# Patient Record
Sex: FEMALE | Race: BLACK OR AFRICAN AMERICAN | NOT HISPANIC OR LATINO | Employment: OTHER | URBAN - METROPOLITAN AREA
[De-identification: names, ages, dates, MRNs, and addresses within clinical notes are randomized per-mention and may not be internally consistent; named-entity substitution may affect disease eponyms.]

---

## 2017-01-04 ENCOUNTER — TELEPHONE (OUTPATIENT)
Dept: SURGERY | Facility: CLINIC | Age: 62
End: 2017-01-04

## 2017-01-04 NOTE — TELEPHONE ENCOUNTER
----- Message from Radha Corky sent at 1/4/2017  2:58 PM CST -----  Contact: patient  Patient calling to let the Doctor know that the port isn't in the same place where it was. She wants to speak with a Nurse. Please advise.  Call back .  Thanks!  Patient states that she woke up this morning and she feels like her port moved.  She had it accessed yesterday and she wants to have Dr. Jay to check it.  I scheduled her to see Dr. Jay on Friday, 1/6/17 at 10:30am.  Pako

## 2017-01-06 ENCOUNTER — OFFICE VISIT (OUTPATIENT)
Dept: SURGERY | Facility: CLINIC | Age: 62
End: 2017-01-06
Payer: MEDICARE

## 2017-01-06 VITALS
TEMPERATURE: 98 F | BODY MASS INDEX: 35.2 KG/M2 | WEIGHT: 198.63 LBS | DIASTOLIC BLOOD PRESSURE: 81 MMHG | SYSTOLIC BLOOD PRESSURE: 164 MMHG | HEART RATE: 78 BPM | HEIGHT: 63 IN

## 2017-01-06 DIAGNOSIS — Z09 POSTOP CHECK: Primary | ICD-10-CM

## 2017-01-06 PROCEDURE — 99213 OFFICE O/P EST LOW 20 MIN: CPT | Mod: PBBFAC,PO | Performed by: SURGERY

## 2017-01-06 PROCEDURE — 99024 POSTOP FOLLOW-UP VISIT: CPT | Mod: ,,, | Performed by: SURGERY

## 2017-01-06 PROCEDURE — 99999 PR PBB SHADOW E&M-EST. PATIENT-LVL III: CPT | Mod: PBBFAC,,, | Performed by: SURGERY

## 2017-01-06 NOTE — MR AVS SNAPSHOT
"    [facility]  [address] [facility] [address]  [facility] [address]  Phone: [phone]                  Annie Mason   2017 10:30 AM   Office Visit    Description:  Female : 1955   Provider:  Ghulam Jay MD   Department:  [facility]           Reason for Visit     Post-op Evaluation           Diagnoses this Visit        Comments    Postop check    -  Primary            To Do List           Future Appointments        Provider Department Dept Phone    1/10/2017 11:15 AM Anderson Adair DPM [facility] Podiatry [phone]      Goals (5 Years of Data)     None      [facility]     [facility] On Call Nurse Care Line -  Assistance  Registered nurses in the [facility] provide clinical advisement, health education, appointment booking, and other advisory services.  Call for this free service at [phone].             Medications           Message regarding Medications     Verify the changes and/or additions to your medication regime listed below are the same as discussed with your clinician today.  If any of these changes or additions are incorrect, please notify your healthcare provider.             Verify that the below list of medications is an accurate representation of the medications you are currently taking.  If none reported, the list may be blank. If incorrect, please contact your healthcare provider. Carry this list with you in case of emergency.           Current Medications     amlodipine (NORVASC) 5 MG tablet Take 5 mg by mouth Daily.    aspirin (ECOTRIN) 81 MG EC tablet Take 81 mg by mouth once daily.    BD INSULIN PEN NEEDLE UF SHORT 31 gauge x 5/16" Ndle INJECT 1 DEVICE INTO THE SKIN 4 (FOUR) TIMES DAILY.    blood sugar diagnostic (ONETOUCH VERIO) Strp 1 strip 3 (three) times daily.    famotidine (PEPCID) 20 MG tablet Take 1 tablet (20 mg total) by mouth 2 (two) times daily.    fluocinonide 0.05% (LIDEX) 0.05 % cream Apply topically " "2 (two) times daily.    hydrochlorothiazide (HYDRODIURIL) 25 MG tablet TAKE 1 TABLET EVERY MORNING    insulin glargine (LANTUS SOLOSTAR) 100 unit/mL (3 mL) InPn pen Inject 32 Units into the skin once daily.    levothyroxine (SYNTHROID) 50 MCG tablet Take 1 tablet (50 mcg total) by mouth once daily.    loratadine (CLARITIN) 10 mg tablet TAKE 1 TABLET ONCE A DAY FOR 2 WEEKS THEN AS NEEDED FOR ALLERGIES    metformin (GLUCOPHAGE-XR) 750 MG 24 hr tablet Take 1 tablet (750 mg total) by mouth 2 (two) times daily with meals.    multivitamin (ONE DAILY MULTIVITAMIN) per tablet Take 1 tablet by mouth once daily.    pyridostigmine (MESTINON) 60 mg Tab Take 1 tablet (60 mg total) by mouth 3 (three) times daily as needed.    tramadol (ULTRAM) 50 mg tablet Take 1 tablet by mouth 4 (four) times daily as needed.    valsartan (DIOVAN) 160 MG tablet TAKE 1 TABLET BY MOUTH EVERY DAY    vitamin B comp and C no.3 15-10-50-5-300 mg Cap 1 po q day    fluticasone (FLONASE) 50 mcg/actuation nasal spray 2 sprays each  nostril bid x 1 week then q day prn  nasal congestion or sneezing    hydrocodone-acetaminophen 5-325mg (NORCO) 5-325 mg per tablet Take 1 tablet by mouth every 6 (six) hours as needed for Pain.    meclizine (ANTIVERT) 25 mg tablet Take 1 tablet by mouth 3 (three) times daily as needed.    ONETOUCH DELICA LANCETS 30 gauge Misc 1 lancet by Misc.(Non-Drug; Combo Route) route 3 (three) times daily.           Clinical Reference Information           Vital Signs - Last Recorded  Most recent update: 1/6/2017 10:00 AM by Pako Aponte MA    BP Pulse Temp Ht Wt BMI    (!) 164/81 78 97.7 °F (36.5 °C) (Oral) 5' 3" (1.6 m) 90.1 kg (198 lb 10.2 oz) 35.19 kg/m2      Blood Pressure          Most Recent Value    BP  (!)  164/81      Allergies as of 1/6/2017     Ramipril      Immunizations Administered on Date of Encounter - 1/6/2017     None      [facility] Sign-Up     Activating your [facility] account is as easy as 1-2-3!     1) Visit " [email or web address], select Sign Up Now, enter this activation code and your date of birth, then select Next.  [email or web address]  Expires: 1/13/2017  4:28 PM      2) Create a username and password to use when you visit [facility] in the future and select a security question in case you lose your password and select Next.    3) Enter your e-mail address and click Sign Up!    Additional Information  If you have questions, please e-mail [email or web address] or call [phone] to talk to our [facility] staff. Remember, [facility] is NOT to be used for urgent needs. For medical emergencies, dial 911.

## 2017-01-06 NOTE — PROGRESS NOTES
Cc: post op    HPI: 61 y.o.  female  3 weeks s/p port placement.   Pt notes that she is feeling well.  Port used without event on 12/23.  Pt notes that it felt like it moed    PE: AFVSS    AAOx3  CTA  Soft/NT/nd  Inc: c/d/i      Port palpated without event     A/P:   Pt doing well post surgery.   F/U with me prn

## 2017-01-10 PROBLEM — S93.401A MILD SPRAIN OF RIGHT ANKLE: Status: ACTIVE | Noted: 2017-01-10

## 2017-03-14 ENCOUNTER — TELEPHONE (OUTPATIENT)
Dept: SURGERY | Facility: CLINIC | Age: 62
End: 2017-03-14

## 2017-03-14 NOTE — TELEPHONE ENCOUNTER
----- Message from Lis Sunni sent at 3/14/2017  1:51 PM CDT -----  Contact: [phone]  When pt goes to infusion,, having problem with port, having no blood return,, would like to speak to nurse    Spoke to pt, offered f/u with Dr Jay to check port, states she will wait for infusion nurse to make that decision. They are able to infuse but did not get a blood return. Confirms she will call on the recommendation of the infusion nurse.

## 2017-03-15 PROBLEM — Z95.828 PORT-A-CATH IN PLACE: Status: ACTIVE | Noted: 2017-03-15

## 2017-04-03 ENCOUNTER — TELEPHONE (OUTPATIENT)
Dept: NEUROLOGY | Facility: CLINIC | Age: 62
End: 2017-04-03

## 2017-04-07 PROBLEM — G47.33 OSA (OBSTRUCTIVE SLEEP APNEA): Status: ACTIVE | Noted: 2017-04-07

## 2017-04-28 ENCOUNTER — TELEPHONE (OUTPATIENT)
Dept: NEUROLOGY | Facility: CLINIC | Age: 62
End: 2017-04-28

## 2017-04-28 NOTE — TELEPHONE ENCOUNTER
----- Message from Karissa Gonzalez sent at 4/28/2017 11:05 AM CDT -----  Yovani with [facility] infusion states he received a call from the pharmacy states her prescription for IVIG is out of date, they are requesting a new prescription faxed to the Infusion suite at [phone], contact Yovani at [phone].with any questions.       Thank you

## 2017-04-28 NOTE — TELEPHONE ENCOUNTER
[facility] infusion center is requesting a new rx for the infusions. The one they have is now .

## 2017-05-01 NOTE — TELEPHONE ENCOUNTER
Spoke with Debbie at the infusion center. A new prescription has been written and will be faxed as soon as Dr. Ireland is in the office to sign.

## 2017-05-19 PROBLEM — Z02.89 ENCOUNTER FOR COMPLETION OF FORM WITH PATIENT: Status: ACTIVE | Noted: 2017-05-19

## 2017-05-19 PROBLEM — L98.9 SKIN LESION: Status: ACTIVE | Noted: 2017-05-19

## 2017-06-09 ENCOUNTER — TELEPHONE (OUTPATIENT)
Dept: SURGERY | Facility: CLINIC | Age: 62
End: 2017-06-09

## 2017-06-09 DIAGNOSIS — T85.698A MALFUNCTION OF DEVICE, INITIAL ENCOUNTER: Primary | ICD-10-CM

## 2017-06-09 NOTE — TELEPHONE ENCOUNTER
Patient informed of her Fluro x-ray scheduled on Wednesday, 6/21/17 at 10:00am.  Patient is to arrive 30 minutes earlier and no food or drink 4hrs prior to the x-ray.  Patient understood.  Pako

## 2017-06-21 ENCOUNTER — HOSPITAL ENCOUNTER (OUTPATIENT)
Dept: RADIOLOGY | Facility: HOSPITAL | Age: 62
Discharge: HOME | End: 2017-06-21
Attending: SURGERY
Payer: MEDICARE

## 2017-06-21 DIAGNOSIS — T85.698A MALFUNCTION OF DEVICE, INITIAL ENCOUNTER: ICD-10-CM

## 2017-06-21 PROCEDURE — 77001 FLUOROGUIDE FOR VEIN DEVICE: CPT | Mod: TC,PO

## 2017-09-08 PROBLEM — Z79.01 ANTICOAGULANT LONG-TERM USE: Status: ACTIVE | Noted: 2017-09-08

## 2017-09-08 PROBLEM — M25.551 ACUTE RIGHT HIP PAIN: Status: ACTIVE | Noted: 2017-09-08

## 2017-09-08 PROBLEM — Z79.01 ANTICOAGULANT LONG-TERM USE: Status: RESOLVED | Noted: 2017-09-08 | Resolved: 2017-09-08

## 2017-09-08 PROBLEM — B02.9 HERPES ZOSTER WITHOUT COMPLICATION: Status: ACTIVE | Noted: 2017-09-08

## 2017-11-09 ENCOUNTER — TELEPHONE (OUTPATIENT)
Dept: NEUROLOGY | Facility: CLINIC | Age: 62
End: 2017-11-09

## 2017-11-09 NOTE — TELEPHONE ENCOUNTER
----- Message from Steffany Vaughn sent at 11/8/2017  1:24 PM CST -----  Contact: self  Pt is calling in regards to scheduling an appt. Books aren't currently open to schedule this appt.    Pt would like a call back at [phone].    Thank you

## 2017-11-20 ENCOUNTER — TELEPHONE (OUTPATIENT)
Dept: NEUROLOGY | Facility: CLINIC | Age: 62
End: 2017-11-20

## 2017-11-20 ENCOUNTER — OFFICE VISIT (OUTPATIENT)
Dept: NEUROLOGY | Facility: CLINIC | Age: 62
End: 2017-11-20
Payer: MEDICARE

## 2017-11-20 VITALS
BODY MASS INDEX: 37.52 KG/M2 | HEIGHT: 63 IN | SYSTOLIC BLOOD PRESSURE: 164 MMHG | HEART RATE: 60 BPM | WEIGHT: 211.75 LBS | DIASTOLIC BLOOD PRESSURE: 92 MMHG

## 2017-11-20 DIAGNOSIS — G70.00 MYASTHENIA GRAVIS: Primary | ICD-10-CM

## 2017-11-20 PROCEDURE — 99213 OFFICE O/P EST LOW 20 MIN: CPT | Mod: PBBFAC,PN | Performed by: PSYCHIATRY & NEUROLOGY

## 2017-11-20 PROCEDURE — 99999 PR PBB SHADOW E&M-EST. PATIENT-LVL III: CPT | Mod: PBBFAC,,, | Performed by: PSYCHIATRY & NEUROLOGY

## 2017-11-20 PROCEDURE — 99214 OFFICE O/P EST MOD 30 MIN: CPT | Mod: S$PBB,,, | Performed by: PSYCHIATRY & NEUROLOGY

## 2017-11-20 NOTE — PROGRESS NOTES
Date of service:  11/20/2017    Chief complaint:  Myasthenia gravis    Interval history:  The patient is a 61 y.o. female seen previously for MG.  She has continued on IVIG.  She has seen significant improvement since starting that therapy.  She indicates that her weakness has improved, her ptosis has improved, and her swallowing has improved overall.  Her scheduled IVIG infusion is being delayed by the Thanksgiving holidays, and she is feeling fatigued.  She does endorse some mild, subjective issues with her breathing.  Also of note since her last visit, she continued to have access issues, so she had a port placed.  She has been taking the Mestinon TID.  She reports no side effects.      History of present illness:  The patient is a 61 y.o. female referred for evaluation of myasthenia gravis. She states that she was diagnosed with this in 1992.  She was seen for this in [place] by Dr. Evans.  Her workup included a Tensilon test.  She is not sure if antibodies were sent.  She never had RNS.  Her symptoms at that time involved diplopia and generalized weakness.  Her management has consisted of steroids, Mestinon, plasmaphoresis, IVIG, and thymectomy.  She she is not sure if she had a thymoma.  Presently, she continues to have issues with weakness in the extremities, ptosis, and difficulty swallowing (both solids and liquids) at times.  She feels as though she has been doing relatively well recently.  Her previous neurologist apparently told her to take the Mestinon on a prn basis.  She has had some issues with GI discomfort from taking the Mestinon periodically.  She is not on any scheduled immunotherapy but has been receiving IVIG treatments as rescue repeatedly in recent years.        Past Medical History:   Diagnosis Date    Allergy     Anticoagulant long-term use     Brachial plexitis     Depression     Diabetes mellitus, type 2     GERD (gastroesophageal reflux disease)     Hyperlipidemia 2/4/2015     "Hypertension     Hypothyroidism     MG (myasthenia gravis)     Myasthenia gravis without exacerbation 3/25/2015    Seizures     "years ago"    Stroke     2008       Past Surgical History:   Procedure Laterality Date    CHOLECYSTECTOMY      PORTACATH PLACEMENT  12/14/2016    thalmusectomy      TUBAL LIGATION     Thymectomy, not thalmusectomy      Family History   Problem Relation Age of Onset    Diabetes Mother     Hypertension Mother     Hypertension Father     Stroke Father        Social history:  Social History     Social History    Marital status: Single     Spouse name: N/A    Number of children: 12    Years of education: N/A     Occupational History    disabled      Social History Main Topics    Smoking status: Never Smoker    Smokeless tobacco: Never Used    Alcohol use No    Drug use: No    Sexual activity: Not on file     Other Topics Concern    Not on file     Social History Narrative    Had 12 living children, did lose a set of triplets that were very premature; 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   Denies T/E/D    Review of Systems  General/Constitutional:  No unintentional weight loss, No change in appetite  Eyes/Vision:  No change in vision, No double vision  ENT:  No frequent nose bleeds, No ringing in the ears  Respiratory:  No cough, No wheezing  Cardiovascular:  No chest pain, No palpitations  Gastrointestinal:  No jaundice, + nausea/vomiting  Genitourinary:  No incontinence, No burning with urination  Hematologic/Lymphatic:  + easy bruising/bleeding, No night sweats  Neurological:  No numbness, No weakness  Endocrine:  No fatigue, + heat/cold intolerance  Allergy/Immunologic:  No fevers, No chills  Musculoskeletal:  No muscle pain, No joint pain   Psychiatric:  No thoughts of harming self/others, No depression  Integumentary:  No rashes, No sores that do not heal     Physical exam:  BP (!) 164/92 (BP Location: Right arm, Patient Position: Sitting, BP " "Method: Medium (Automatic))   Pulse 60   Ht 5' 3" (1.6 m)   Wt 96.1 kg (211 lb 12 oz)   BMI 37.51 kg/m²   General: Well developed, obese.  No acute distress.  HEENT: Atraumatic, normocephalic.  Neck: Supple, trachea midline.  Cardiovascular: Regular rate and rhythm.  Pulmonary: No increased work of breathing.  Able to count to 15 in a single breath.  Abdomen/GI: No guarding.  Musculoskeletal: No obvious joint deformities, moves all extremities well.    Neurological exam:  Mental status:  Awake and alert.  Oriented x4.  Speech fluent and appropriate.  Somewhat hoarse.  Recent and remote memory appear to be intact.  Fund of knowledge normal.  Cranial nerves: Pupils equal round and reactive to light, extraocular movements intact, facial strength and sensation intact bilaterally, palate and tongue midline, hearing grossly intact bilaterally.  Motor: 4-5 out of 5 strength throughout the upper and lower extremities bilaterally with fatigability noted. Normal bulk and tone.  Sensation: Intact to light touch and temperature bilaterally.  DTR: 1+ at the knees and biceps bilaterally.  Coordination: Finger-nose-finger testing intact bilaterally.  Gait: Antalgic gait.    Data base:  Notes of the referring physician were reviewed.  Briefly summarized, these discuss her MG as well as other issues.    Recent labs include a BMP with mild hyperglycemia.  Antibodies for MG were negative.    Assessment and plan:  The patient is a 61 y.o. female with a reported history of MG.  I think she does have this disorder.  She appears to have been doing well with IVIG every 6 weeks.  I am concerned that these delayed doses may be contributing to her breathing concerns.  We will check FVCs in an effort to better quantify her respiratory complaints.  She is to continue taking her Mestinon TID.  She understands that if her breathing worsens, her speech changes, swallowing issues/choking arises, she begins to have falls, or any other safety " issues arise, she should present immediately to the ER. We will plan on seeing the patient back in a few months.

## 2017-11-20 NOTE — TELEPHONE ENCOUNTER
----- Message from Karissa Gonzalez sent at 11/20/2017  2:22 PM CST -----  Contact patient concerning rescheduling her pulmonary function test, she does not have a ride today. contact # [phone].    Thank you

## 2017-11-20 NOTE — TELEPHONE ENCOUNTER
Spoke with patient and she could not get a ride to [place] for her Pulmonary Lab appointment. Appointment was rescheduled to tomorrow at 8:00am, she indicated understanding.

## 2017-11-20 NOTE — TELEPHONE ENCOUNTER
----- Message from Sasha Yates sent at 11/20/2017  3:08 PM CST -----  Contact: Patient  Patient would like a phone call from doctors office, because she has a question regarding her test today.  Call Back#[phone]  Thanks

## 2017-11-21 ENCOUNTER — TELEPHONE (OUTPATIENT)
Dept: NEUROLOGY | Facility: CLINIC | Age: 62
End: 2017-11-21

## 2017-11-21 ENCOUNTER — HOSPITAL ENCOUNTER (OUTPATIENT)
Dept: PULMONOLOGY | Facility: CLINIC | Age: 62
Discharge: HOME | End: 2017-11-21
Payer: MEDICARE

## 2017-11-21 DIAGNOSIS — G70.00 MYASTHENIA GRAVIS: ICD-10-CM

## 2017-11-21 LAB
PRE FEV1 FVC: 73
PRE FEV1: 1.33
PRE FVC: 1.83
PREDICTED FEV1 FVC: 80
PREDICTED FEV1: 2.38
PREDICTED FVC: 2.99

## 2017-11-21 PROCEDURE — 94010 BREATHING CAPACITY TEST: CPT | Mod: 26,S$PBB,, | Performed by: INTERNAL MEDICINE

## 2017-11-21 PROCEDURE — 94010 BREATHING CAPACITY TEST: CPT | Mod: PBBFAC | Performed by: INTERNAL MEDICINE

## 2017-11-21 RX ORDER — PYRIDOSTIGMINE BROMIDE 60 MG/1
60 TABLET ORAL 4 TIMES DAILY
Qty: 360 TABLET | Refills: 3 | Status: SHIPPED | OUTPATIENT
Start: 2017-11-21 | End: 2018-11-22 | Stop reason: SDUPTHER

## 2017-11-21 NOTE — TELEPHONE ENCOUNTER
----- Message from Julienne Aguilarzayra sent at 11/21/2017  2:47 PM CST -----  Contact: Self  Patient is requesting a refill of her pyridostigmine (MESTINON) 60 mg Tab.  Thank you    [facility] #[id number] - [address]  [address]  [place] [address]  Phone: [phone] Fax: [phone]

## 2017-11-21 NOTE — TELEPHONE ENCOUNTER
Returned patients call and gave her results of test done today in the pulmonary lab as per Dr. Ireland. He said while she didn't do great she did do well enough that she did not have to be hospitalized. He advised that if she should have trouble breathing she should go to the ER. She said she is out of the Mestinon and she doesn't know how long she has been out. Dr. Ireland refilled it today and advised that she go pick it up at the pharmacy and start taking it right away, she indicated understanding.

## 2017-11-21 NOTE — TELEPHONE ENCOUNTER
----- Message from Julienne Ramirez sent at 11/21/2017  2:50 PM CST -----  Contact: Self  Patient is requesting the results of her tests that she took this morning!  Please call [phone].  Thanks

## 2017-11-22 ENCOUNTER — TELEPHONE (OUTPATIENT)
Dept: NEUROLOGY | Facility: CLINIC | Age: 62
End: 2017-11-22

## 2017-11-22 NOTE — TELEPHONE ENCOUNTER
----- Message from Antoinette Mejia sent at 11/22/2017  1:43 PM CST -----  Contact: [phone] norbert with [facility]   [phone] norbert with [facility] calling to get new order for IVIg.

## 2017-11-22 NOTE — TELEPHONE ENCOUNTER
Returned call and spoke with Debbie at the Infusion center. A new IVIG order is needed for the patient before her next appointment on Monday. Please advise.

## 2017-12-26 PROBLEM — J45.21 MILD INTERMITTENT ASTHMA WITH ACUTE EXACERBATION: Status: ACTIVE | Noted: 2017-12-26

## 2017-12-26 PROBLEM — B02.9 HERPES ZOSTER WITHOUT COMPLICATION: Status: RESOLVED | Noted: 2017-09-08 | Resolved: 2017-12-26

## 2018-01-02 ENCOUNTER — OFFICE VISIT (OUTPATIENT)
Dept: NEUROLOGY | Facility: CLINIC | Age: 63
End: 2018-01-02
Payer: MEDICARE

## 2018-01-02 VITALS
DIASTOLIC BLOOD PRESSURE: 85 MMHG | RESPIRATION RATE: 20 BRPM | HEIGHT: 63 IN | WEIGHT: 208.13 LBS | BODY MASS INDEX: 36.88 KG/M2 | SYSTOLIC BLOOD PRESSURE: 157 MMHG | HEART RATE: 83 BPM

## 2018-01-02 DIAGNOSIS — G70.00 MYASTHENIA GRAVIS: Primary | ICD-10-CM

## 2018-01-02 DIAGNOSIS — I10 BENIGN ESSENTIAL HTN: ICD-10-CM

## 2018-01-02 PROCEDURE — 99214 OFFICE O/P EST MOD 30 MIN: CPT | Mod: S$PBB,,, | Performed by: PSYCHIATRY & NEUROLOGY

## 2018-01-02 PROCEDURE — 99213 OFFICE O/P EST LOW 20 MIN: CPT | Mod: PBBFAC,PN | Performed by: PSYCHIATRY & NEUROLOGY

## 2018-01-02 PROCEDURE — 99999 PR PBB SHADOW E&M-EST. PATIENT-LVL III: CPT | Mod: PBBFAC,,, | Performed by: PSYCHIATRY & NEUROLOGY

## 2018-01-02 NOTE — PROGRESS NOTES
"Date of service:  1/2/2018    Chief complaint:  Myasthenia gravis    Interval history:  The patient is a 62 y.o. female seen previously for MG, originally diagnosed in 1992 who is S/P thymectomy.  She has continued on IVIG.  She has seen significant improvement since starting that therapy.  Her IVIG regimen had been delayed at the time of her last visit.  She is doing better with respect to her MG now.  Also of note, she has been diagnosed with asthma.     History of present illness:  The patient is a 62 y.o. female referred for evaluation of myasthenia gravis. She states that she was diagnosed with this in 1992.  She was seen for this in [place] by Dr. Evans.  Her workup included a Tensilon test.  She is not sure if antibodies were sent.  She never had RNS.  Her symptoms at that time involved diplopia and generalized weakness.  Her management has consisted of steroids, Mestinon, plasmaphoresis, IVIG, and thymectomy.  She she is not sure if she had a thymoma.  Presently, she continues to have issues with weakness in the extremities, ptosis, and difficulty swallowing (both solids and liquids) at times.  She feels as though she has been doing relatively well recently.  Her previous neurologist apparently told her to take the Mestinon on a prn basis.  She has had some issues with GI discomfort from taking the Mestinon periodically.  She is not on any scheduled immunotherapy but has been receiving IVIG treatments as rescue repeatedly in recent years.        Past Medical History:   Diagnosis Date    Allergy     Anticoagulant long-term use     Brachial plexitis     Depression     Diabetes mellitus, type 2     GERD (gastroesophageal reflux disease)     Hyperlipidemia 2/4/2015    Hypertension     Hypothyroidism     MG (myasthenia gravis)     Mild intermittent asthma with acute exacerbation 12/26/2017    Myasthenia gravis without exacerbation 3/25/2015    Seizures     "years ago"    Stroke     2008       Past " "Surgical History:   Procedure Laterality Date    CHOLECYSTECTOMY      PORTACATH PLACEMENT  12/14/2016    thalmusectomy      TUBAL LIGATION     Thymectomy, not thalmusectomy      Family History   Problem Relation Age of Onset    Diabetes Mother     Hypertension Mother     Hypertension Father     Stroke Father        Social history:  Social History     Social History    Marital status: Single     Spouse name: N/A    Number of children: 12    Years of education: N/A     Occupational History    disabled      Social History Main Topics    Smoking status: Never Smoker    Smokeless tobacco: Never Used    Alcohol use No    Drug use: No    Sexual activity: Not on file     Other Topics Concern    Not on file     Social History Narrative    Had 12 living children, did lose a set of triplets that were very premature; 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   Denies T/E/D    Review of Systems  General/Constitutional:  No unintentional weight loss, No change in appetite  Eyes/Vision:  No change in vision, No double vision  ENT:  No frequent nose bleeds, No ringing in the ears  Respiratory:  No cough, No wheezing  Cardiovascular:  No chest pain, No palpitations  Gastrointestinal:  No jaundice, + nausea/vomiting  Genitourinary:  No incontinence, No burning with urination  Hematologic/Lymphatic:  + easy bruising/bleeding, No night sweats  Neurological:  No numbness, No weakness  Endocrine:  No fatigue, + heat/cold intolerance  Allergy/Immunologic:  No fevers, No chills  Musculoskeletal:  No muscle pain, No joint pain   Psychiatric:  No thoughts of harming self/others, No depression  Integumentary:  No rashes, No sores that do not heal     Physical exam:  BP (!) 157/85   Pulse 83   Resp 20   Ht 5' 3" (1.6 m)   Wt 94.4 kg (208 lb 1.8 oz)   BMI 36.87 kg/m²      General: Well developed, obese.  No acute distress.  HEENT: Atraumatic, normocephalic.  Neck: Supple, trachea midline.  Cardiovascular: " Regular rate and rhythm.  Pulmonary: No increased work of breathing.  Able to count to 15 in a single breath.  Abdomen/GI: No guarding.  Musculoskeletal: No obvious joint deformities, moves all extremities well.    Neurological exam:  Mental status:  Awake and alert.  Oriented x4.  Speech fluent and appropriate.  Somewhat hoarse.  Recent and remote memory appear to be intact.  Fund of knowledge normal.  Cranial nerves: Pupils equal round and reactive to light, extraocular movements intact, facial strength and sensation intact bilaterally, palate and tongue midline, hearing grossly intact bilaterally.  Motor: 4-5 out of 5 strength throughout the upper and lower extremities bilaterally with fatigability noted. Normal bulk and tone.  Sensation: Intact to light touch and temperature bilaterally.  DTR: 1+ at the knees and biceps bilaterally.  Coordination: Finger-nose-finger testing intact bilaterally.  Gait: Antalgic gait.    Data base:  Notes of the referring physician were reviewed.  Briefly summarized, these discuss her MG as well as other issues.    Recent labs include a BMP with mild hyperglycemia.  Antibodies for MG were negative.    Assessment and plan:  The patient is a 62 y.o. female with a reported history of MG.  I think she does have this disorder.  She appears to have been doing well with IVIG every 6 weeks.  She is to continue taking her Mestinon TID.  We will check serologies.  She understands that if her breathing worsens, her speech changes, swallowing issues/choking arises, she begins to have falls, or any other safety issues arise, she should present immediately to the ER.     The patient was noted to be hypertensive in clinic today.  Repeat BP check confirmed this.  I have asked the patient to follow up with their PCP about this and my staff to message the patient's PCP.     We will plan on seeing the patient back in a few months.

## 2018-03-07 PROBLEM — R10.12 LUQ ABDOMINAL PAIN: Status: ACTIVE | Noted: 2018-03-07

## 2018-03-07 PROBLEM — R82.90 MALODOROUS URINE: Status: ACTIVE | Noted: 2018-03-07

## 2018-03-08 ENCOUNTER — OFFICE VISIT (OUTPATIENT)
Dept: NEUROLOGY | Facility: CLINIC | Age: 63
End: 2018-03-08
Payer: MEDICARE

## 2018-03-08 VITALS
SYSTOLIC BLOOD PRESSURE: 132 MMHG | HEART RATE: 62 BPM | DIASTOLIC BLOOD PRESSURE: 71 MMHG | WEIGHT: 206.63 LBS | HEIGHT: 63 IN | RESPIRATION RATE: 20 BRPM | BODY MASS INDEX: 36.61 KG/M2

## 2018-03-08 DIAGNOSIS — G70.00 MYASTHENIA GRAVIS: Primary | ICD-10-CM

## 2018-03-08 PROCEDURE — 99213 OFFICE O/P EST LOW 20 MIN: CPT | Mod: PBBFAC,PN | Performed by: PSYCHIATRY & NEUROLOGY

## 2018-03-08 PROCEDURE — 99214 OFFICE O/P EST MOD 30 MIN: CPT | Mod: S$PBB,,, | Performed by: PSYCHIATRY & NEUROLOGY

## 2018-03-08 PROCEDURE — 99999 PR PBB SHADOW E&M-EST. PATIENT-LVL III: CPT | Mod: PBBFAC,,, | Performed by: PSYCHIATRY & NEUROLOGY

## 2018-03-08 NOTE — PROGRESS NOTES
"Date of service:  3/8/2018    Chief complaint:  Myasthenia gravis    Interval history:  The patient is a 62 y.o. female seen previously for MG, originally diagnosed in 1992 who is S/P thymectomy.  She has continued on IVIG.  She has seen significant improvement since starting that therapy.  She did miss one day of IVIG, and she indicates that she can tell a difference.  Also of note, she indicates that her asthma also limits her activity.     History of present illness:  The patient is a 62 y.o. female referred for evaluation of myasthenia gravis. She states that she was diagnosed with this in 1992.  She was seen for this in [place] by Dr. Evans.  Her workup included a Tensilon test.  She is not sure if antibodies were sent.  She never had RNS.  Her symptoms at that time involved diplopia and generalized weakness.  Her management has consisted of steroids, Mestinon, plasmaphoresis, IVIG, and thymectomy.  She she is not sure if she had a thymoma.  Presently, she continues to have issues with weakness in the extremities, ptosis, and difficulty swallowing (both solids and liquids) at times.  She feels as though she has been doing relatively well recently.  Her previous neurologist apparently told her to take the Mestinon on a prn basis.  She has had some issues with GI discomfort from taking the Mestinon periodically.  She is not on any scheduled immunotherapy but has been receiving IVIG treatments as rescue repeatedly in recent years.        Past Medical History:   Diagnosis Date    Allergy     Anticoagulant long-term use     Brachial plexitis     Depression     Diabetes mellitus, type 2     GERD (gastroesophageal reflux disease)     Hyperlipidemia 2/4/2015    Hypertension     Hypothyroidism     MG (myasthenia gravis)     Mild intermittent asthma with acute exacerbation 12/26/2017    Myasthenia gravis without exacerbation 3/25/2015    Seizures     "years ago"    Stroke     2008       Past Surgical " "History:   Procedure Laterality Date    CHOLECYSTECTOMY      PORTACATH PLACEMENT  12/14/2016    thalmusectomy      TUBAL LIGATION     Thymectomy, not thalmusectomy      Family History   Problem Relation Age of Onset    Diabetes Mother     Hypertension Mother     Hypertension Father     Stroke Father        Social History     Social History    Marital status: Single     Spouse name: N/A    Number of children: 12    Years of education: N/A     Occupational History    disabled      Social History Main Topics    Smoking status: Never Smoker    Smokeless tobacco: Never Used    Alcohol use No    Drug use: No    Sexual activity: Not on file     Other Topics Concern    Not on file     Social History Narrative    Had 12 living children, did lose a set of triplets that were very premature; 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   Denies T/E/D    Review of Systems  General/Constitutional:  No unintentional weight loss, No change in appetite  Eyes/Vision:  No change in vision, No double vision  ENT:  No frequent nose bleeds, No ringing in the ears  Respiratory:  No cough, No wheezing  Cardiovascular:  No chest pain, No palpitations  Gastrointestinal:  No jaundice, + nausea/vomiting  Genitourinary:  No incontinence, No burning with urination  Hematologic/Lymphatic:  + easy bruising/bleeding, No night sweats  Neurological:  No numbness, No weakness  Endocrine:  No fatigue, + heat/cold intolerance  Allergy/Immunologic:  No fevers, No chills  Musculoskeletal:  No muscle pain, No joint pain   Psychiatric:  No thoughts of harming self/others, No depression  Integumentary:  No rashes, No sores that do not heal     Physical exam:  /71   Pulse 62   Resp 20   Ht 5' 3" (1.6 m)   Wt 93.7 kg (206 lb 9.6 oz)   BMI 36.60 kg/m²    General: Well developed, obese.  No acute distress.  HEENT: Atraumatic, normocephalic.  Neck: Supple, trachea midline.  Cardiovascular: Regular rate and " rhythm.  Pulmonary: No increased work of breathing.  Able to count to 35 in a single breath.  Abdomen/GI: No guarding.  Musculoskeletal: No obvious joint deformities, moves all extremities well.    Neurological exam:  Mental status:  Awake and alert.  Oriented x4.  Speech fluent and appropriate.  Somewhat hoarse.  Recent and remote memory appear to be intact.  Fund of knowledge normal.  Cranial nerves: Pupils equal round and reactive to light, extraocular movements intact, facial strength and sensation intact bilaterally, palate and tongue midline, hearing grossly intact bilaterally.  Motor: 4-5 out of 5 strength throughout the upper and lower extremities bilaterally with fatigability noted. Normal bulk and tone.  Sensation: Intact to light touch and temperature bilaterally.  DTR: 1+ at the knees and biceps bilaterally.  Coordination: Finger-nose-finger testing intact bilaterally.  Gait: Antalgic gait.    Data base:  Notes of the referring physician were reviewed.  Briefly summarized, these discuss her MG as well as other issues.    Recent labs include a BMP with mild hyperglycemia.  Antibodies for MG were negative.    Assessment and plan:  The patient is a 62 y.o. female with a reported history of MG.  I think she does have this disorder.  She appears to have been doing well with IVIG every 6 weeks.  She is to continue taking her Mestinon TID.  We will check serologies.  She understands that if her breathing worsens, her speech changes, swallowing issues/choking arises, she begins to have falls, or any other safety issues arise, she should present immediately to the ER.  The patient expressed interest in a second opinion to see if her condition could be further optimized.  We will arrange for this.    We will plan on seeing the patient back in a few months.

## 2018-04-02 ENCOUNTER — TELEPHONE (OUTPATIENT)
Dept: NEUROLOGY | Facility: CLINIC | Age: 63
End: 2018-04-02

## 2018-04-02 PROBLEM — H66.001 ACUTE SUPPURATIVE OTITIS MEDIA OF RIGHT EAR WITHOUT SPONTANEOUS RUPTURE OF TYMPANIC MEMBRANE: Status: ACTIVE | Noted: 2018-04-02

## 2018-04-02 PROBLEM — J20.9 ACUTE BRONCHITIS: Status: ACTIVE | Noted: 2018-04-02

## 2018-04-02 NOTE — TELEPHONE ENCOUNTER
The patient is at the infusion center, she has a temp of 99.4. She has a low grade temp the last couple of days. She has also had chest and nasal congestion. Spoke with Meredith at the infusion center and informed her to postpone the fusion for a few days.  The patient will update the office in a few days if she is not better.

## 2018-04-02 NOTE — TELEPHONE ENCOUNTER
----- Message from Annamarie Mandel sent at 4/2/2018  9:24 AM CDT -----  Contact: Michelle with [facility] [phone]  Michelle with [facility] [phone] calling because patient is currently at office for infusion and they need to know if they hold IVIG today or not. Please advise. Thanks.

## 2018-04-03 ENCOUNTER — TELEPHONE (OUTPATIENT)
Dept: NEUROLOGY | Facility: CLINIC | Age: 63
End: 2018-04-03

## 2018-04-03 NOTE — TELEPHONE ENCOUNTER
----- Message from Dimitri Ireland Jr., MD sent at 4/3/2018  8:12 AM CDT -----  I'd like for her symptoms to be significantly improving before starting IVIG.  Let's stay in touch with her and figure it out.    ----- Message -----  From: Michelle Martinez RN  Sent: 4/2/2018   4:40 PM  To: MD Dr. Shu Rachel Jr.,    Ms. Mason was seen by Dr. Rocha today and put on antibiotics. Are you ok with us starting IVIG on Wednesday and completing 3 days this week and 2 next week or just start her Monday and have 5 consecutive days?    Thanks,  Michelle Martinez

## 2018-04-05 ENCOUNTER — TELEPHONE (OUTPATIENT)
Dept: NEUROLOGY | Facility: CLINIC | Age: 63
End: 2018-04-05

## 2018-04-05 NOTE — TELEPHONE ENCOUNTER
The patient state's, she does have anymore fever.  She has an occasional cough. She is getting weak and would like to reschedule the infusions.  Please advise.

## 2018-04-05 NOTE — TELEPHONE ENCOUNTER
----- Message from Pricila Ceballos sent at 4/5/2018 11:25 AM CDT -----  Contact: self  Patient called regarding if she can have an infusion approved, stating she has been sick this week. Please contact [phone] (jrgw)

## 2018-04-06 ENCOUNTER — TELEPHONE (OUTPATIENT)
Dept: NEUROLOGY | Facility: CLINIC | Age: 63
End: 2018-04-06

## 2018-04-06 NOTE — TELEPHONE ENCOUNTER
Patient notified that she is scheduled for her infusion on Monday and Dr. Ireland said it is OK to restart.

## 2018-04-06 NOTE — TELEPHONE ENCOUNTER
----- Message from Julienne Ramirez sent at 4/5/2018  3:22 PM CDT -----  Contact: Self  Patient is returning your call, call back at [phone] (home).  Thank you!

## 2018-05-21 ENCOUNTER — TELEPHONE (OUTPATIENT)
Dept: NEUROLOGY | Facility: CLINIC | Age: 63
End: 2018-05-21

## 2018-05-21 NOTE — TELEPHONE ENCOUNTER
----- Message from Debbie Cook sent at 5/21/2018  2:15 PM CDT -----  Need New IVIG orders for patient. Please fax to Infusion suite. [phone]

## 2018-05-23 ENCOUNTER — TELEPHONE (OUTPATIENT)
Dept: NEUROLOGY | Facility: CLINIC | Age: 63
End: 2018-05-23

## 2018-05-23 NOTE — TELEPHONE ENCOUNTER
----- Message from Hair Agosto RN sent at 5/22/2018  4:38 PM CDT -----  Contact: hair  How do you want this script sent back..

## 2018-06-22 PROBLEM — J20.9 ACUTE BRONCHITIS: Status: RESOLVED | Noted: 2018-04-02 | Resolved: 2018-06-22

## 2018-06-22 PROBLEM — R82.90 MALODOROUS URINE: Status: RESOLVED | Noted: 2018-03-07 | Resolved: 2018-06-22

## 2018-06-22 PROBLEM — M25.551 ACUTE RIGHT HIP PAIN: Status: RESOLVED | Noted: 2017-09-08 | Resolved: 2018-06-22

## 2018-06-22 PROBLEM — H66.001 ACUTE SUPPURATIVE OTITIS MEDIA OF RIGHT EAR WITHOUT SPONTANEOUS RUPTURE OF TYMPANIC MEMBRANE: Status: RESOLVED | Noted: 2018-04-02 | Resolved: 2018-06-22

## 2018-06-22 PROBLEM — Z91.148 NONCOMPLIANCE WITH MEDICATION TREATMENT DUE TO UNDERUSE OF MEDICATION: Status: ACTIVE | Noted: 2018-06-22

## 2018-06-22 PROBLEM — R10.12 LUQ ABDOMINAL PAIN: Status: RESOLVED | Noted: 2018-03-07 | Resolved: 2018-06-22

## 2018-06-22 PROBLEM — S93.401A MILD SPRAIN OF RIGHT ANKLE: Status: RESOLVED | Noted: 2017-01-10 | Resolved: 2018-06-22

## 2018-06-22 PROBLEM — J45.21 MILD INTERMITTENT ASTHMA WITH ACUTE EXACERBATION: Status: RESOLVED | Noted: 2017-12-26 | Resolved: 2018-06-22

## 2018-07-16 PROBLEM — L85.3 DRY SKIN: Status: ACTIVE | Noted: 2018-07-16

## 2018-07-16 PROBLEM — R60.0 PERIPHERAL EDEMA: Status: ACTIVE | Noted: 2018-07-16

## 2018-10-05 ENCOUNTER — OFFICE VISIT (OUTPATIENT)
Dept: NEUROLOGY | Facility: CLINIC | Age: 63
End: 2018-10-05
Payer: MEDICARE

## 2018-10-05 VITALS
HEIGHT: 63 IN | WEIGHT: 214 LBS | SYSTOLIC BLOOD PRESSURE: 130 MMHG | BODY MASS INDEX: 37.92 KG/M2 | RESPIRATION RATE: 16 BRPM | DIASTOLIC BLOOD PRESSURE: 87 MMHG | HEART RATE: 69 BPM

## 2018-10-05 DIAGNOSIS — G70.00 MYASTHENIA GRAVIS: Primary | ICD-10-CM

## 2018-10-05 DIAGNOSIS — G62.9 PERIPHERAL POLYNEUROPATHY: ICD-10-CM

## 2018-10-05 DIAGNOSIS — G56.00 CARPAL TUNNEL SYNDROME, UNSPECIFIED LATERALITY: ICD-10-CM

## 2018-10-05 PROCEDURE — 99215 OFFICE O/P EST HI 40 MIN: CPT | Mod: S$PBB,,, | Performed by: PSYCHIATRY & NEUROLOGY

## 2018-10-05 PROCEDURE — 99213 OFFICE O/P EST LOW 20 MIN: CPT | Mod: PBBFAC,PN | Performed by: PSYCHIATRY & NEUROLOGY

## 2018-10-05 PROCEDURE — 99999 PR PBB SHADOW E&M-EST. PATIENT-LVL III: CPT | Mod: PBBFAC,,, | Performed by: PSYCHIATRY & NEUROLOGY

## 2018-10-05 RX ORDER — DULOXETIN HYDROCHLORIDE 30 MG/1
30 CAPSULE, DELAYED RELEASE ORAL DAILY
Qty: 30 CAPSULE | Refills: 11 | Status: SHIPPED | OUTPATIENT
Start: 2018-10-05 | End: 2019-01-17

## 2018-10-05 RX ORDER — GABAPENTIN 300 MG/1
300 CAPSULE ORAL NIGHTLY
Refills: 3 | COMMUNITY
Start: 2018-08-28 | End: 2019-02-26

## 2018-10-05 NOTE — PROGRESS NOTES
Date of service:  10/5/2018    Chief complaint:  Myasthenia gravis    Interval history:  The patient is a 62 y.o. female seen previously for MG, originally diagnosed in 1992 who is S/P thymectomy.  She has continued on IVIG.  She has seen significant improvement since starting that therapy.  When she has previously missed one day of IVIG, and she indicates that she can tell a difference.  Also of note, she indicates that her asthma also limits her activity.  She reports that she was seen by \[facility]\"" neurology.  It is unclear from speaking with her what exactly she was told.  We have not received records from them and will request them.  It sounds, though, that they did additionally diagnose her with neuropathy and CTS.  They gave her Neurontin for this, but she had trouble tolerating it.    History of present illness:  The patient is a 62 y.o. female referred for evaluation of myasthenia gravis. She states that she was diagnosed with this in 1992.  She was seen for this in [place] by Dr. Evans.  Her workup included a Tensilon test.  She is not sure if antibodies were sent.  She never had RNS.  Her symptoms at that time involved diplopia and generalized weakness.  Her management has consisted of steroids, Mestinon, plasmaphoresis, IVIG, and thymectomy.  She she is not sure if she had a thymoma.  Presently, she continues to have issues with weakness in the extremities, ptosis, and difficulty swallowing (both solids and liquids) at times.  She feels as though she has been doing relatively well recently.  Her previous neurologist apparently told her to take the Mestinon on a prn basis.  She has had some issues with GI discomfort from taking the Mestinon periodically.  She is not on any scheduled immunotherapy but has been receiving IVIG treatments as rescue repeatedly in recent years.        Past Medical History:   Diagnosis Date    Allergy     Anticoagulant long-term use     Brachial plexitis     Depression      "Diabetes mellitus, type 2     GERD (gastroesophageal reflux disease)     Hyperlipidemia 2/4/2015    Hypertension     Hypothyroidism     MG (myasthenia gravis)     Mild intermittent asthma with acute exacerbation 12/26/2017    Myasthenia gravis without exacerbation 3/25/2015    Seizures     "years ago"    Stroke     2008       Past Surgical History:   Procedure Laterality Date    CHOLECYSTECTOMY      [id number] N/A 12/14/2016    [facility]    PORTACATH PLACEMENT  12/14/2016    thalmusectomy      TUBAL LIGATION     Thymectomy, not thalmusectomy      Family History   Problem Relation Age of Onset    Diabetes Mother     Hypertension Mother     Hypertension Father     Stroke Father        Social History     Socioeconomic History    Marital status: Single     Spouse name: Not on file    Number of children: 12    Years of education: Not on file    Highest education level: Not on file   Social Needs    Financial resource strain: Not on file    Food insecurity - worry: Not on file    Food insecurity - inability: Not on file    Transportation needs - medical: Not on file    Transportation needs - non-medical: Not on file   Occupational History    Occupation: disabled   Tobacco Use    Smoking status: Never Smoker    Smokeless tobacco: Never Used   Substance and Sexual Activity    Alcohol use: No    Drug use: No    Sexual activity: Not on file   Other Topics Concern    Not on file   Social History Narrative    Had 12 living children, did lose a set of triplets that were very premature; 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   Denies T/E/D    Review of Systems  General/Constitutional:  No unintentional weight loss, No change in appetite  Eyes/Vision:  No change in vision, No double vision  ENT:  No frequent nose bleeds, No ringing in the ears  Respiratory:  No cough, No wheezing  Cardiovascular:  No chest pain, No " "palpitations  Gastrointestinal:  No jaundice, + nausea/vomiting  Genitourinary:  No incontinence, No burning with urination  Hematologic/Lymphatic:  + easy bruising/bleeding, No night sweats  Neurological:  No numbness, No weakness  Endocrine:  No fatigue, + heat/cold intolerance  Allergy/Immunologic:  No fevers, No chills  Musculoskeletal:  No muscle pain, No joint pain   Psychiatric:  No thoughts of harming self/others, No depression  Integumentary:  No rashes, No sores that do not heal     Physical exam:  /87 (BP Location: Right arm, Patient Position: Sitting, BP Method: Medium (Automatic))   Pulse 69   Resp 16   Ht 5' 3" (1.6 m)   Wt 97.1 kg (214 lb)   BMI 37.91 kg/m²    General: Well developed, obese.  No acute distress.  HEENT: Atraumatic, normocephalic.  Neck: Supple, trachea midline.  Cardiovascular: Regular rate and rhythm.  Pulmonary: No increased work of breathing.  Able to count to 35 in a single breath.  Abdomen/GI: No guarding.  Musculoskeletal: No obvious joint deformities, moves all extremities well.    Neurological exam:  Mental status:  Awake and alert.  Oriented x4.  Speech fluent and appropriate.  Somewhat hoarse.  Recent and remote memory appear to be intact.  Fund of knowledge normal.  Cranial nerves: Pupils equal round and reactive to light, extraocular movements intact, facial strength and sensation intact bilaterally, palate and tongue midline, hearing grossly intact bilaterally.  Motor: 4-5 out of 5 strength throughout the upper and lower extremities bilaterally with fatigability noted. Normal bulk and tone.  Sensation: Intact to light touch and temperature bilaterally.  DTR: 1+ at the knees and biceps bilaterally.  Coordination: Finger-nose-finger testing intact bilaterally.  Gait: Antalgic gait.    Data base:  Records from her visit in the neuromuscular clinic at [facility] were not available for review at the time of our visit.  We will request them.    Recent labs include a BMP with " mild hyperglycemia.  Antibodies for MG were negative.    Assessment and plan:  The patient is a 62 y.o. female with a reported history of MG.  I think she does have this disorder.  She appears to have been doing well with IVIG every 6 weeks.  She is to continue taking her Mestinon TID.  We will check serologies.  She understands that if her breathing worsens, her speech changes, swallowing issues/choking arises, she begins to have falls, or any other safety issues arise, she should present immediately to the ER.      Regarding her CTS and neuropathy, since the patient did not tolerate Neurontin, we will try Cymbalta.  Medication side effects were discussed.  We also discussed wrist splints for the former.  We will review her EMG from [facility].  If she fails conservative management, we will consider referral to neurosurgery for her CTS.    We will plan on seeing the patient back in a few months.

## 2018-10-08 ENCOUNTER — TELEPHONE (OUTPATIENT)
Dept: NEUROLOGY | Facility: CLINIC | Age: 63
End: 2018-10-08

## 2018-10-08 NOTE — TELEPHONE ENCOUNTER
Medical records from [facility] received, copy sent to scanning, original placed on Dr Shu russell for review.

## 2018-10-30 RX ORDER — ACETAMINOPHEN 325 MG/1
325 TABLET ORAL
Status: CANCELLED | OUTPATIENT
Start: 2018-10-31 | End: 2018-11-01

## 2018-11-05 ENCOUNTER — TELEPHONE (OUTPATIENT)
Dept: NEUROLOGY | Facility: CLINIC | Age: 63
End: 2018-11-05

## 2018-11-05 NOTE — TELEPHONE ENCOUNTER
----- Message from Angela Martell sent at 11/5/2018  8:29 AM CST -----  Contact: PT  Pt would like to be called back regarding her appt...    Pt can be reached at [phone]

## 2018-11-05 NOTE — TELEPHONE ENCOUNTER
Called pharmacy to change the medication to lower dose, tried to contact patient to let her know, voice mail box has not been set up.

## 2018-11-05 NOTE — TELEPHONE ENCOUNTER
Patient c/o that the Cymbalta wrokes, but makes are nauseated.  She has tried taking it in the morning and the evening.  Please advise.

## 2018-11-06 ENCOUNTER — TELEPHONE (OUTPATIENT)
Dept: NEUROLOGY | Facility: CLINIC | Age: 63
End: 2018-11-06

## 2018-11-06 NOTE — TELEPHONE ENCOUNTER
----- Message from Jovanny Nielson sent at 11/5/2018  2:27 PM CST -----  Contact: pt   States she's rtn nurses call and can be reached at [phone]//thanks/dbw

## 2018-11-06 NOTE — TELEPHONE ENCOUNTER
Spoke with Dharmesh at [facility] infusion center concerning the IVIG orders, they are correct and the patient is scheduled. Attempted to let the patient know, her voice mail is not set up.

## 2018-11-20 ENCOUNTER — TELEPHONE (OUTPATIENT)
Dept: NEUROLOGY | Facility: CLINIC | Age: 63
End: 2018-11-20

## 2018-11-20 NOTE — TELEPHONE ENCOUNTER
Need clarification on IVIG orders.  She was getting IVIG for 5 days every 6 weeks.  Please advise.

## 2018-11-20 NOTE — TELEPHONE ENCOUNTER
----- Message from Sera Tran sent at 11/20/2018 10:17 AM CST -----  Contact: norbert/infusion room [phone]  States that she needs new orders for pt. Please fax orders to [phone]. Please call back at [phone]//thank you acc

## 2018-11-22 RX ORDER — PYRIDOSTIGMINE BROMIDE 60 MG/1
TABLET ORAL
Qty: 360 TABLET | Refills: 3 | Status: SHIPPED | OUTPATIENT
Start: 2018-11-22 | End: 2021-02-23 | Stop reason: SDUPTHER

## 2019-01-08 ENCOUNTER — TELEPHONE (OUTPATIENT)
Dept: NEUROLOGY | Facility: CLINIC | Age: 64
End: 2019-01-08

## 2019-01-08 PROBLEM — Z91.199 NO-SHOW FOR APPOINTMENT: Status: ACTIVE | Noted: 2019-01-08

## 2019-01-08 NOTE — TELEPHONE ENCOUNTER
----- Message from Josh Lerma sent at 1/8/2019  1:48 PM CST -----  Contact: Patient  Type: Needs Sooner Appointment    Who Called:  Patient  Symptoms (please be specific):  Neck pain torticollis right arm   How long has patient had these symptoms:  3 days  Best Call Back Number: [phone]  Additional Information: Patient is requesting earlier appointment. First available is 3/28/19. Please advise

## 2019-01-17 ENCOUNTER — OFFICE VISIT (OUTPATIENT)
Dept: NEUROLOGY | Facility: CLINIC | Age: 64
End: 2019-01-17
Payer: MEDICARE

## 2019-01-17 VITALS
BODY MASS INDEX: 36.08 KG/M2 | SYSTOLIC BLOOD PRESSURE: 169 MMHG | HEART RATE: 71 BPM | RESPIRATION RATE: 18 BRPM | DIASTOLIC BLOOD PRESSURE: 95 MMHG | WEIGHT: 203.69 LBS

## 2019-01-17 DIAGNOSIS — G70.00 MYASTHENIA GRAVIS: Primary | ICD-10-CM

## 2019-01-17 DIAGNOSIS — M54.12 CERVICAL RADICULOPATHY: ICD-10-CM

## 2019-01-17 DIAGNOSIS — M54.2 CERVICALGIA: ICD-10-CM

## 2019-01-17 PROCEDURE — 99214 OFFICE O/P EST MOD 30 MIN: CPT | Mod: S$PBB,,, | Performed by: PSYCHIATRY & NEUROLOGY

## 2019-01-17 PROCEDURE — 99999 PR PBB SHADOW E&M-EST. PATIENT-LVL III: ICD-10-PCS | Mod: PBBFAC,,, | Performed by: PSYCHIATRY & NEUROLOGY

## 2019-01-17 PROCEDURE — 99214 PR OFFICE/OUTPT VISIT, EST, LEVL IV, 30-39 MIN: ICD-10-PCS | Mod: S$PBB,,, | Performed by: PSYCHIATRY & NEUROLOGY

## 2019-01-17 PROCEDURE — 99213 OFFICE O/P EST LOW 20 MIN: CPT | Mod: PBBFAC,PN | Performed by: PSYCHIATRY & NEUROLOGY

## 2019-01-17 PROCEDURE — 99999 PR PBB SHADOW E&M-EST. PATIENT-LVL III: CPT | Mod: PBBFAC,,, | Performed by: PSYCHIATRY & NEUROLOGY

## 2019-01-17 RX ORDER — DULOXETIN HYDROCHLORIDE 60 MG/1
60 CAPSULE, DELAYED RELEASE ORAL DAILY
Qty: 30 CAPSULE | Refills: 11 | Status: SHIPPED | OUTPATIENT
Start: 2019-01-17 | End: 2019-03-05

## 2019-01-17 NOTE — PROGRESS NOTES
Date of service:  1/17/2019    Chief complaint:  Myasthenia gravis    Interval history:  The patient is a 63 y.o. female seen previously for MG, originally diagnosed in 1992 who is S/P thymectomy.  She has continued on IVIG.  She has seen significant improvement since starting that therapy; however, she is finding that after 5 weeks she is becoming weaker.   Regarding her CTS and peripheral neuropathy, this has been largely stable.  Of note, she has not been taking her Cymbalta.  Finally, the patient has had some neck pain with radiation to the right arm.    History of present illness:  The patient is a 63 y.o. female referred for evaluation of myasthenia gravis. She states that she was diagnosed with this in 1992.  She was seen for this in [place] by Dr. Evans.  Her workup included a Tensilon test.  She is not sure if antibodies were sent.  She never had RNS.  Her symptoms at that time involved diplopia and generalized weakness.  Her management has consisted of steroids, Mestinon, plasmaphoresis, IVIG, and thymectomy.  She she is not sure if she had a thymoma.  Presently, she continues to have issues with weakness in the extremities, ptosis, and difficulty swallowing (both solids and liquids) at times.  She feels as though she has been doing relatively well recently.  Her previous neurologist apparently told her to take the Mestinon on a prn basis.  She has had some issues with GI discomfort from taking the Mestinon periodically.  She is not on any scheduled immunotherapy but has been receiving IVIG treatments as rescue repeatedly in recent years.        Past Medical History:   Diagnosis Date    Allergy     Anticoagulant long-term use     Brachial plexitis     Depression     Diabetes mellitus, type 2     GERD (gastroesophageal reflux disease)     Hyperlipidemia 2/4/2015    Hypertension     Hypothyroidism     MG (myasthenia gravis)     Mild intermittent asthma with acute exacerbation 12/26/2017     "Myasthenia gravis without exacerbation 3/25/2015    Seizures     "years ago"    Stroke     2008       Past Surgical History:   Procedure Laterality Date    CHOLECYSTECTOMY      [id number] N/A 12/14/2016    [facility]    PORTACATH PLACEMENT  12/14/2016    thalmusectomy      TUBAL LIGATION     Thymectomy, not thalmusectomy      Family History   Problem Relation Age of Onset    Diabetes Mother     Hypertension Mother     Hypertension Father     Stroke Father        Social History     Socioeconomic History    Marital status: Single     Spouse name: Not on file    Number of children: 12    Years of education: Not on file    Highest education level: Not on file   Social Needs    Financial resource strain: Not on file    Food insecurity - worry: Not on file    Food insecurity - inability: Not on file    Transportation needs - medical: Not on file    Transportation needs - non-medical: Not on file   Occupational History    Occupation: disabled   Tobacco Use    Smoking status: Never Smoker    Smokeless tobacco: Never Used   Substance and Sexual Activity    Alcohol use: No    Drug use: No    Sexual activity: Not on file   Other Topics Concern    Not on file   Social History Narrative    Had 12 living children, did lose a set of triplets that were very premature; 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   Denies T/E/D    Review of Systems  General/Constitutional:  No unintentional weight loss, No change in appetite  Eyes/Vision:  No change in vision, No double vision  ENT:  No frequent nose bleeds, No ringing in the ears  Respiratory:  No cough, No wheezing  Cardiovascular:  No chest pain, No palpitations  Gastrointestinal:  No jaundice, + nausea/vomiting  Genitourinary:  No incontinence, No burning with urination  Hematologic/Lymphatic:  + easy bruising/bleeding, No night sweats  Neurological:  No numbness, No weakness  Endocrine:  No " fatigue, + heat/cold intolerance  Allergy/Immunologic:  No fevers, No chills  Musculoskeletal:  No muscle pain, No joint pain   Psychiatric:  No thoughts of harming self/others, No depression  Integumentary:  No rashes, No sores that do not heal     Physical exam:  BP (!) 169/95 (BP Location: Right arm, Patient Position: Sitting, BP Method: Large (Automatic))   Pulse 71   Resp 18   Wt 92.4 kg (203 lb 11.2 oz)   BMI 36.08 kg/m²    General: Well developed, obese.  No acute distress.  HEENT: Atraumatic, normocephalic.  Neck: Supple, trachea midline.  Cardiovascular: Regular rate and rhythm.  Pulmonary: No increased work of breathing.  Able to count to 35 in a single breath.  Abdomen/GI: No guarding.  Musculoskeletal: No obvious joint deformities, moves all extremities well.    Neurological exam:  Mental status:  Awake and alert.  Oriented x4.  Speech fluent and appropriate.  Somewhat hoarse.  Recent and remote memory appear to be intact.  Fund of knowledge normal.  Cranial nerves: Pupils equal round and reactive to light, extraocular movements intact, facial strength and sensation intact bilaterally, palate and tongue midline, hearing grossly intact bilaterally.  Motor: 4-5 out of 5 strength throughout the upper and lower extremities bilaterally with fatigability noted. Normal bulk and tone.  Sensation: Intact to light touch and temperature bilaterally.  DTR: 1+ at the knees and biceps bilaterally.  Coordination: Finger-nose-finger testing intact bilaterally.  Gait: Antalgic gait.    Data base:  Records from her visit in the neuromuscular clinic at \[facility]\"" were not available for review at the time of our visit.  We will request them.    Recent labs include a BMP with mild hyperglycemia.  Antibodies for MG were negative.    Assessment and plan:  The patient is a 63 y.o. female with a reported history of MG.  I think she does have this disorder.  She appears to be requiring IVIG more often than every 5 or 6 weeks, so we  will have her get it every 4 weeks.  She is to continue taking her Mestinon TID.  We will check serologies.  She understands that if her breathing worsens, her speech changes, swallowing issues/choking arises, she begins to have falls, or any other safety issues arise, she should present immediately to the ER.      Regarding her CTS and neuropathy, we will resume her Cymbalta.  Medication side effects were discussed.  We also discussed wrist splints for the former. If she fails conservative management, we will consider referral to neurosurgery for her CTS.    Regarding her cervicalgia, we will start with conservative management.  Hopefully, PT, NSAIDs, and Cymbalta will help.  If it has not improved in several weeks, we will check an EMG.    We will plan on seeing the patient back in a few months.

## 2019-01-28 PROBLEM — R06.09 DOE (DYSPNEA ON EXERTION): Status: ACTIVE | Noted: 2019-01-28

## 2019-02-04 ENCOUNTER — CLINICAL SUPPORT (OUTPATIENT)
Dept: REHABILITATION | Facility: HOSPITAL | Age: 64
End: 2019-02-04
Attending: PSYCHIATRY & NEUROLOGY
Payer: MEDICARE

## 2019-02-04 DIAGNOSIS — M54.2 CERVICALGIA: ICD-10-CM

## 2019-02-04 DIAGNOSIS — M54.12 CERVICAL RADICULOPATHY: ICD-10-CM

## 2019-02-04 PROCEDURE — 97110 THERAPEUTIC EXERCISES: CPT | Mod: PO | Performed by: PHYSICAL THERAPIST

## 2019-02-04 PROCEDURE — 97162 PT EVAL MOD COMPLEX 30 MIN: CPT | Mod: PO | Performed by: PHYSICAL THERAPIST

## 2019-02-04 NOTE — PLAN OF CARE
".  [facility]  Physical Therapy Initial Evaluation    Name: Annie Mason  Clinic Number: [id number]    Therapy Diagnosis:   Encounter Diagnoses   Name Primary?    Cervicalgia     Cervical radiculopathy      Physician: Dimitri Ireland Jr., MD    Physician Orders: PT Eval and Treat, neck-RUE  Medical Diagnosis from Referral:   M54.2 (ICD-10-CM) - Cervicalgia   M54.12 (ICD-10-CM) - Cervical radiculopathy     Evaluation Date: 2/4/2019  Authorization Period Expiration: 1/17/2020  Plan of Care Expiration: 4/05/19  Visit # / Visits authorized: 1/1    Time In: 1005 (technical troubles)  Time Out: 1050  Total Billable Time: 42 minutes    Precautions: Standard, Diabetes and Fall    Subjective   Date of onset: 5 weeks  History of current condition - Annie reports: she is s/p thymectomy "a few years ago, 1998 maybe." She reports becoming weaker over the last 5 weeks. She has weakness in her extremities and difficulty swallowing per complaints to MD. This seems somewhat better she reported to the MD. She is having trouble turning her head with driving and reaching over head and behind the back to dress. She fell off her steps (5 steps at home), 1/7/19. R hand dominant.     Medical History:   Past Medical History:   Diagnosis Date    Allergy     Anticoagulant long-term use     Brachial plexitis     Depression     Diabetes mellitus, type 2     GERD (gastroesophageal reflux disease)     Hyperlipidemia 2/4/2015    Hypertension     Hypothyroidism     MG (myasthenia gravis)     Mild intermittent asthma with acute exacerbation 12/26/2017    Myasthenia gravis without exacerbation 3/25/2015    Seizures     "years ago"    Stroke     2008       Surgical History:   Annie Mason  has a past surgical history that includes thalmusectomy; Cholecystectomy; Tubal ligation; Portacath placement (12/14/2016); Left heart catheterization (Left, 1/28/2019); and Coronary angiography (N/A, " 1/28/2019).    Medications:   Annie has a current medication list which includes the following prescription(s): albuterol, albuterol sulfate, ammonium lactate, amoxicillin-clavulanate 875-125mg, aspirin, atorvastatin, blood sugar diagnostic, blood sugar diagnostic, blood sugar diagnostic, blood-glucose meter, blood-glucose meter, carvedilol, diazepam, diclofenac sodium, duloxetine, ergocalciferol, fluocinonide 0.05%, fluticasone, fluticasone-vilanterol, furosemide, hydrocodone-acetaminophen, insulin, insulin aspart u-100, insulin syringe-needle u-100, lancets, levothyroxine, loratadine, magnesium, metformin, multivitamin, ondansetron, onetouch delica lancets, onetouch verio, pen needle, diabetic, potassium chloride sa, prednisone, pyridostigmine, and vitamin b comp and c no.3.    Allergies:   Review of patient's allergies indicates:   Allergen Reactions    Ramipril      Other reaction(s): caused a severe cough        Imaging,  MRI CERVICAL SPINE WITHOUT CONTRAST1/07/19: Mild early cervical spondylosis without evidence for canal, cord, or foraminal compromise.    MRI BRAIN WITHOUT CONTRAST 1/7/19: No acute intracranial abnormality.  Mild moderate supratentorial white matter changes, while nonspecific, most commonly sequela of chronic microvascular ischemia.  Partial empty sella.    CT CERVICAL SPINE WITHOUT CONTRAST 1/7/19: No acute fracture or traumatic malalignment of the cervical spine.     CT HEAD WITHOUT CONTRAST 1/7/19:No acute intracranial abnormality.    Prior Therapy: none  Social History:  lives with their family  Occupation: disabled  Prior Level of Function: (I)  Current Level of Function: She is having trouble turning her head with driving and reaching over head and behind the back to dress.     Pain:  Current 5/10, worst 7/10, best 4/10   Location: right neck   Description: Aching  Aggravating Factors: Laying  Easing Factors: pain medication and heating pad    Pts goals: dec pain and improved  "ROM    Objective       Observation: FHP and rounded shoulders, grimacing, PPT in sitting    Posture: see above    Cervical Range of Motion:    Degrees Pain             Right Rotation 37 pain     Left Rotation 60 No c/o     Right Side Bending 15 Limited by pain   Left Side Bending 25 No c/o      Shoulder Range of Motion:   Shoulder Left Right   Flexion 125 105   Abduction 118 95   ER Spine of L scap 2 in above R shld   IR L1 Post hip R       Upper Extremity Strength  (R) UE  (L) UE    Shoulder flexion: 4-/5 Shoulder flexion: 3+/5   Shoulder Abduction: 4-/5 Shoulder abduction: 3+/5   Shoulder ER 4-/5 Shoulder ER 3+/5   Shoulder IR 4-/5 Shoulder IR 3+/5     Special Tests:  Distraction No change   Compression Inc pain       Sharp-Delgado neg     Neg Alar L. Testing B    Palpation: pain to palpation R UT    Sensation: dec'd C5/6 R    CMS Impairment/Limitation/Restriction for FOTO NECK Survey    Therapist reviewed FOTO scores for Annie Mason on 2/4/2019.   FOTO documents entered into [employer] - see Media section.    Limitation Score: 56%         TREATMENT   Treatment Time In: 1042  Treatment Time Out: 1050  Total Treatment time separate from Evaluation: 8 minutes    Annie received therapeutic exercises to develop ROM for 8 minutes including:  Demo and understanding of AROM: L/R cx rot, L/R cx lat flex, cx flex 2x10, UT stretch R 3x30", 3x/day  Handout to be given next session due to computer troubles.    Home Exercises and Patient Education Provided    Education provided:   - Pt/family was provided educational information, including: role of PT, role of pt/caregiver, goals for PT, POC, scheduling, and attendance policy.- pt verbalized understanding.  - HEP: AROM    Written Home Exercises Provided: yes.  Exercises were reviewed and Annie was able to demonstrate them prior to the end of the session.  Annie demonstrated fair  understanding of the education provided.     See EMR under Patient Instructions for exercises " provided 2/4/2019.    Assessment   Annie is a 63 y.o. female referred to outpatient Physical Therapy with a medical diagnosis of   M54.2 (ICD-10-CM) - Cervicalgia   M54.12 (ICD-10-CM) - Cervical radiculopathy   . Pt presents with poor posture, impaired cx and shld ROM, impaired strength, pain to R UT palpation, and decrease R C5-6 sensation.    Pt prognosis is Good.   Pt will benefit from skilled outpatient Physical Therapy to address the deficits stated above and in the chart below, provide pt/family education, and to maximize pt's level of independence.     Plan of care discussed with patient: Yes  Pt's spiritual, cultural and educational needs considered and patient is agreeable to the plan of care and goals as stated below:     Anticipated Barriers for therapy: high pain, medical hx    Medical Necessity is demonstrated by the following  History  Co-morbidities and personal factors that may impact the plan of care Co-morbidities:   depression, diabetes, difficulty sleeping, HTN and MG    Personal Factors:   age  lifestyle     high   Examination  Body Structures and Functions, activity limitations and participation restrictions that may impact the plan of care Body Regions:   neck  upper extremities    Body Systems:    ROM  strength  transfers    Participation Restrictions:   Reaching, grooming, bathing, driving    Activity limitations:   Learning and applying knowledge  no deficits    General Tasks and Commands  no deficits    Communication  no deficits    Mobility  lifting and carrying objects    Self care  washing oneself (bathing, drying, washing hands)  caring for body parts (brushing teeth, shaving, grooming)  toileting  dressing    Domestic Life  doing house work (cleaning house, washing dishes, laundry)    Interactions/Relationships  no deficits    Life Areas  no deficits    Community and Social Life  no deficits         high   Clinical Presentation evolving clinical presentation with changing clinical  characteristics moderate   Decision Making/ Complexity Score: moderate     Goals:  Short Term Goals: 4 weeks   STG #1: inc R active cervical rot to 45 deg to assist with driving.  STG #2: inc R active cx lat flex to 20 deg to demo improved pain.  STG #3: in R shld active ROM flex to 110 deg to assist with reaching.    Long Term Goals:  8 weeks   FOTO impairment score improved to 50% or better given MG dx and imaging to improve QOL long term with MG.    Plan   Plan of care Certification: 2/4/2019 to 4/05/2019.    Outpatient Physical Therapy 3 times weekly for 8 weeks or (60 days per MD order) to include the following interventions: Cervical/Lumbar Traction, Manual Therapy, Moist Heat/ Ice, Neuromuscular Re-ed, Patient Education, Self Care, Therapeutic Activites and Therapeutic Exercise.     Aishwarya Castano, PT

## 2019-02-20 ENCOUNTER — TELEPHONE (OUTPATIENT)
Dept: NEUROLOGY | Facility: CLINIC | Age: 64
End: 2019-02-20

## 2019-02-20 NOTE — TELEPHONE ENCOUNTER
----- Message from RT Mitch sent at 2/18/2019 10:32 AM CST -----  Contact: LUCIA Chaudhari, [phone] [facility]  LUCIA Chaudhari, [phone] [facility], requesting a call back soon concerning the pt's order, thanks.

## 2019-02-28 ENCOUNTER — CLINICAL SUPPORT (OUTPATIENT)
Dept: REHABILITATION | Facility: HOSPITAL | Age: 64
End: 2019-02-28
Attending: PSYCHIATRY & NEUROLOGY
Payer: MEDICARE

## 2019-02-28 DIAGNOSIS — M54.2 NECK AND SHOULDER PAIN: ICD-10-CM

## 2019-02-28 DIAGNOSIS — M25.519 NECK AND SHOULDER PAIN: ICD-10-CM

## 2019-02-28 PROCEDURE — 97110 THERAPEUTIC EXERCISES: CPT | Mod: PO | Performed by: PHYSICAL THERAPIST

## 2019-02-28 NOTE — PROGRESS NOTES
".  Physical Therapy Daily Treatment Note     Name: Annie Mason  Clinic Number: [id number]    Therapy Diagnosis:   Encounter Diagnosis   Name Primary?    Neck and shoulder pain      Physician: Dimitri Ireland Jr., MD    Visit Date: 2/28/2019  Physician Orders: PT Eval and Treat, neck-RUE  Medical Diagnosis from Referral:   M54.2 (ICD-10-CM) - Cervicalgia   M54.12 (ICD-10-CM) - Cervical radiculopathy      Evaluation Date: 2/4/2019  Authorization Period Expiration: 1/17/2020  Plan of Care Expiration: 4/05/19  Visit # / Visits authorized: 2/20    Time In: 1100  Time Out: 1145  Total Billable Time: 40 minutes    Precautions: Standard, Diabetes and Fall    Subjective     Pt reports: her neck and R shld are feeling decreased pain. She has been doing "some" of the ex, but cannot remember them. Her back is hurting.  She was not compliant with home exercise program.  Response to previous treatment: no adverse effect  Functional change: dec pain    Pain: 3/10 pre tx, 5/10 post tx  Location: right neck  and shoulder      Objective     Annie received therapeutic exercises to develop strength, ROM and posture for 30 minutes including:  UBE 2'2'  Pt declined cervical rot due to eye problem related to MG.  Seated chin tuck with significant manual, verbal, and visual cues 15x  Seated UT stretch R 3x30"   Seated shld flex table slide 2x10  Standing shld flex wall slide x10    Annie received cold pack for 10 minutes to to decrease circulation, pain, and swelling.    Home Exercises Provided and Patient Education Provided     Education provided:   - Reviewed HEP again, provided second handout    Written Home Exercises Provided: yes.  Exercises were reviewed and Annie was able to demonstrate them prior to the end of the session.  Annie demonstrated fair  understanding of the education provided.     See EMR under Patient Instructions for exercises provided prior visit.    Assessment     Pt returns with decreased neck and R shld " pain. She reported her back was hurting, no PT referral for back. She continues with poor posture and needed several and multiple tactile, visual, and VCs for correct ex performance with seated chin tucks. Limited R shld strength is noted.  Annie is progressing well towards her goals.   Pt prognosis is Good.     Pt will continue to benefit from skilled outpatient physical therapy to address the deficits listed in the problem list box on initial evaluation, provide pt/family education and to maximize pt's level of independence in the home and community environment.     Pt's spiritual, cultural and educational needs considered and pt agreeable to plan of care and goals.    Anticipated barriers to physical therapy: MG, diplopia    Goals:   Short Term Goals: 4 weeks   STG #1: inc R active cervical rot to 45 deg to assist with driving.  STG #2: inc R active cx lat flex to 20 deg to demo improved pain.  STG #3: in R shld active ROM flex to 110 deg to assist with reaching.     Long Term Goals:  8 weeks   FOTO impairment score improved to 50% or better given MG dx and imaging to improve QOL long term with MG.    Plan     Cont to addressed deconditioning and cervical pain.    Aishwarya Castano, PT

## 2019-03-03 PROBLEM — R00.2 PALPITATIONS: Status: ACTIVE | Noted: 2019-03-03

## 2019-03-03 PROBLEM — R06.09 DOE (DYSPNEA ON EXERTION): Status: RESOLVED | Noted: 2019-01-28 | Resolved: 2019-03-03

## 2019-03-03 PROBLEM — R94.30 ELEVATED LEFT VENTRICULAR END-DIASTOLIC PRESSURE (LVEDP): Status: ACTIVE | Noted: 2019-03-03

## 2019-03-04 ENCOUNTER — CLINICAL SUPPORT (OUTPATIENT)
Dept: REHABILITATION | Facility: HOSPITAL | Age: 64
End: 2019-03-04
Payer: MEDICARE

## 2019-03-04 DIAGNOSIS — M25.519 NECK AND SHOULDER PAIN: ICD-10-CM

## 2019-03-04 DIAGNOSIS — M54.2 NECK AND SHOULDER PAIN: ICD-10-CM

## 2019-03-04 PROCEDURE — 97110 THERAPEUTIC EXERCISES: CPT | Mod: PO | Performed by: PHYSICAL THERAPIST

## 2019-03-04 PROCEDURE — 97140 MANUAL THERAPY 1/> REGIONS: CPT | Mod: PO | Performed by: PHYSICAL THERAPIST

## 2019-03-04 NOTE — PROGRESS NOTES
".  Physical Therapy Daily Treatment Note     Name: Annie Mason  Clinic Number: [id number]    Therapy Diagnosis:   No diagnosis found.  Physician: Dimitri Ireland Jr., MD    Visit Date: 3/4/2019  Physician Orders: PT Eval and Treat, neck-RUE  Medical Diagnosis from Referral:   M54.2 (ICD-10-CM) - Cervicalgia   M54.12 (ICD-10-CM) - Cervical radiculopathy      Evaluation Date: 2/4/2019  Authorization Period Expiration: 1/17/2020  Plan of Care Expiration: 4/05/19  Reassessment: 3/3/19  Visit # / Visits authorized: 3/20    Time In: 1120 (pt arrived 11 mins late)  Time Out: 1210  Total Billable Time: 40 minutes    Precautions: Standard, Diabetes and Fall    Subjective     Pt reports: her neck and R shld are feeling decreased pain. She c/o numbness R UE to hand pre tx.  She was compliant with home exercise program.  Response to previous treatment: no adverse effect  Functional change: dec pain    Pain: 3/10 pre tx, 0/10 post tx  Location: right neck  and shoulder      Objective     Annie received therapeutic exercises to develop strength, ROM and posture for 30 minutes including:  UBE 2'2'  Seated UT stretch R 3x30"   Seated chin tucks 20x (needed correction)  Seated shld flex table slide 2x10-np  Standing shld flex wall slide x10-np  Supine AAROM press up 3x10 3# dowel  Supine AAROM B shld flex 3# dowel 2x10    Manual therapy to the R occiput for 10 min consisting of suboccipital distraction with inhibition x5 min and STM x5 min.    Annie received cold pack for np minutes to to decrease circulation, pain, and swelling.    Home Exercises Provided and Patient Education Provided     Education provided:   - Reviewed HEP again, provided second handout    Written Home Exercises Provided: Patient instructed to cont prior HEP.  Exercises were reviewed and Annie was able to demonstrate them prior to the end of the session.  Annie demonstrated fair  understanding of the education provided.     See EMR under Patient " Instructions for exercises provided prior visit.    Assessment     Pt returns with decreased neck and R shld pain. She continues with poor posture and needed several and multiple tactile, visual, and VCs for correct ex performance with seated chin tucks. Limited R shld strength is noted. R UE numbness abolished with distraction. A small nodule is palpable and suspect of a lymphnode in the right post cervical region. She is recovering from pneumonia. She is meeting 3/3 STGs with a new goal discussed with pt and established. Assess FOTO next session due to session ending over from pt late arrival.  Annie is progressing well towards her goals.   Pt prognosis is Good.     Pt will continue to benefit from skilled outpatient physical therapy to address the deficits listed in the problem list box on initial evaluation, provide pt/family education and to maximize pt's level of independence in the home and community environment.     Pt's spiritual, cultural and educational needs considered and pt agreeable to plan of care and goals.    Anticipated barriers to physical therapy: MG, diplopia    Goals:   Short Term Goals: 4 weeks   STG #1: inc R active cervical rot to 45 deg to assist with driving. MET, 63 DEG.  STG #2: inc R active cx lat flex to 20 deg to demo improved pain. MET, 26 DEG.  STG #3: in R shld active ROM flex to 110 deg to assist with reaching. MET, 130 DEG.  STG #4: Pt will be able to perform laundry and cleaning dishes without c/o numbness.     Long Term Goals:  8 weeks   FOTO impairment score improved to 50% or better given MG dx and imaging to improve QOL long term with MG. Complete FOTO next session.    Plan     Cont to addressed deconditioning and cervical pain.    Aishwarya Castano, PT

## 2019-03-05 RX ORDER — DULOXETIN HYDROCHLORIDE 60 MG/1
60 CAPSULE, DELAYED RELEASE ORAL DAILY
Qty: 90 CAPSULE | Refills: 3 | Status: SHIPPED | OUTPATIENT
Start: 2019-03-05 | End: 2020-05-11

## 2019-03-05 RX ORDER — DULOXETIN HYDROCHLORIDE 20 MG/1
CAPSULE, DELAYED RELEASE ORAL
Qty: 30 CAPSULE | Refills: 3 | Status: SHIPPED | OUTPATIENT
Start: 2019-03-05 | End: 2019-03-05

## 2019-03-07 ENCOUNTER — CLINICAL SUPPORT (OUTPATIENT)
Dept: REHABILITATION | Facility: HOSPITAL | Age: 64
End: 2019-03-07
Payer: MEDICARE

## 2019-03-07 DIAGNOSIS — M25.519 NECK AND SHOULDER PAIN: ICD-10-CM

## 2019-03-07 DIAGNOSIS — M54.2 NECK AND SHOULDER PAIN: ICD-10-CM

## 2019-03-07 PROCEDURE — 97140 MANUAL THERAPY 1/> REGIONS: CPT | Mod: PO | Performed by: PHYSICAL THERAPIST

## 2019-03-07 PROCEDURE — 97110 THERAPEUTIC EXERCISES: CPT | Mod: PO | Performed by: PHYSICAL THERAPIST

## 2019-03-07 NOTE — PROGRESS NOTES
".  Physical Therapy Daily Treatment Note     Name: Annie Mason  Clinic Number: [id number]    Therapy Diagnosis:   Encounter Diagnosis   Name Primary?    Neck and shoulder pain      Physician: Dimitri Ireland Jr., MD    Visit Date: 3/7/2019  Physician Orders: PT Eval and Treat, neck-RUE  Medical Diagnosis from Referral:   M54.2 (ICD-10-CM) - Cervicalgia   M54.12 (ICD-10-CM) - Cervical radiculopathy      Evaluation Date: 2/4/2019  Authorization Period Expiration: 1/17/2020  Plan of Care Expiration: 4/05/19  Reassessment: 3/3/19  Visit # / Visits authorized: 4/20    Time In: 1130 (pt arrived 24 min late to session)  Time Out: 1200  Total Billable Time: 23 minutes    Precautions: Standard, Diabetes and Fall    Subjective     Pt reports: her neck and R shld are feeling decreased pain. She c/o numbness R UE to hand pre tx. Manual therapy helps her the most.  She was compliant with home exercise program.  Response to previous treatment: no adverse effect  Functional change: dec pain, see goals below.    Pain: 2/10 pre tx, 0/10 post tx  Location: right neck  and shoulder      Objective     Annie received therapeutic exercises to develop strength, ROM and posture for 15 minutes including:  UBE 2'2'-np due to late arrival  Seated UT stretch R 3x30"   Seated chin tucks 20x (needed correction)  Seated shld flex table slide 2x10-np  Standing shld flex wall slide x10-np  Supine AAROM press up 3x10 3# dowel  Supine AAROM B shld flex 3# dowel 2x10    Manual therapy to the R occiput for 8 min consisting of STM to the R side neck x8 min.    Annie received cold pack for np minutes to to decrease circulation, pain, and swelling.    Home Exercises Provided and Patient Education Provided     Education provided:   - Cont HEP    Written Home Exercises Provided: Patient instructed to cont prior HEP.  Exercises were reviewed and Annie was able to demonstrate them prior to the end of the session.  Annie demonstrated good  " understanding of the education provided.     See EMR under Patient Instructions for exercises provided prior visit.    Assessment     Pt returns with decreased neck and R shld pain. She continues with poor posture and needed visual cueing and tactile cueing to correct chin tuck. Encourage pt to work this in front of mirror. Pt notified session would be short due to late arrival. Pt agreeable. Continue to work on decreasing UE sx.  Annie is progressing well towards her goals.   Pt prognosis is Good.     Pt will continue to benefit from skilled outpatient physical therapy to address the deficits listed in the problem list box on initial evaluation, provide pt/family education and to maximize pt's level of independence in the home and community environment.     Pt's spiritual, cultural and educational needs considered and pt agreeable to plan of care and goals.    Anticipated barriers to physical therapy: MG, diplopia    Goals:   Short Term Goals: 4 weeks   STG #1: inc R active cervical rot to 45 deg to assist with driving. MET, 63 DEG.  STG #2: inc R active cx lat flex to 20 deg to demo improved pain. MET, 26 DEG.  STG #3: in R shld active ROM flex to 110 deg to assist with reaching. MET, 130 DEG.  STG #4: Pt will be able to perform laundry and cleaning dishes without c/o numbness.     Long Term Goals:  8 weeks   FOTO impairment score improved to 50% or better given MG dx and imaging to improve QOL long term with MG. Met, 39% 3/7/19.    Plan     Cont to addressed deconditioning and cervical pain.    Aishwarya Castano, PT

## 2019-03-13 ENCOUNTER — CLINICAL SUPPORT (OUTPATIENT)
Dept: REHABILITATION | Facility: HOSPITAL | Age: 64
End: 2019-03-13
Payer: MEDICARE

## 2019-03-13 DIAGNOSIS — M54.2 NECK AND SHOULDER PAIN: ICD-10-CM

## 2019-03-13 DIAGNOSIS — M25.519 NECK AND SHOULDER PAIN: ICD-10-CM

## 2019-03-13 PROCEDURE — 97110 THERAPEUTIC EXERCISES: CPT | Mod: PO | Performed by: PHYSICAL THERAPIST

## 2019-03-13 PROCEDURE — 97010 HOT OR COLD PACKS THERAPY: CPT | Mod: PO | Performed by: PHYSICAL THERAPIST

## 2019-03-13 PROCEDURE — 97140 MANUAL THERAPY 1/> REGIONS: CPT | Mod: PO | Performed by: PHYSICAL THERAPIST

## 2019-03-13 NOTE — PROGRESS NOTES
".  Physical Therapy Daily Treatment Note     Name: Annie Mason  Clinic Number: [id number]    Therapy Diagnosis:   Encounter Diagnosis   Name Primary?    Neck and shoulder pain      Physician: Dimitri Ireland Jr., MD    Visit Date: 3/13/2019  Physician Orders: PT Eval and Treat, neck-RUE  Medical Diagnosis from Referral:   M54.2 (ICD-10-CM) - Cervicalgia   M54.12 (ICD-10-CM) - Cervical radiculopathy      Evaluation Date: 2/4/2019  Authorization Period Expiration: 1/17/2020  Plan of Care Expiration: 4/05/19  Reassessment: 3/3/19  Visit # / Visits authorized: 5/20    Time In: 1317 (pt arrived on time, but went across the bldg to check on another appt, 17 min late to session)  Time Out: 1400  Total Billable Time: 38 minutes    Precautions: Standard, Diabetes and Fall    Subjective     Pt reports: her neck and R shld are feeling decreased pain. She c/o numbness R UE to hand pre tx.   She was compliant with home exercise program.  Response to previous treatment: no adverse effect  Functional change: dec pain, see goals below.    Pain: 2/10 pre tx, 0/10 post tx  Location: right neck  and shoulder      Objective     Annie received therapeutic exercises to develop strength, ROM and posture for 20 minutes including:  UBE 2'2'  Seated UT stretch R 3x30"   Seated chin tucks 20x no correction  Seated shld flex table slide 2x10-np  Standing shld flex wall slide x10-np  Supine AAROM press up 3x10 3# dowel-np  Supine AAROM B shld flex 3# dowel 2x10-np  Shld AROM flex 0# x15  Precor row 20# 2x10  B shld ER ytb 20x  Standing flex with ytb around wrist 10x    Manual therapy to the R occiput for 8 min consisting of STM to the R side neck and shld x8 min.    Annie received cold pack for 10 minutes to to decrease circulation, pain, and swelling.    Home Exercises Provided and Patient Education Provided     Education provided:   - Cont HEP    Written Home Exercises Provided: Patient instructed to cont prior HEP.  Exercises were " reviewed and Annie was able to demonstrate them prior to the end of the session.  Annie demonstrated good  understanding of the education provided.     See EMR under Patient Instructions for exercises provided prior visit.    Assessment     Pt returns with decreased neck and R shld pain. She checked in and when down the mcdonald to check on another appt. PT started late as a result. Strength is main deficit at this time.  Annie is progressing well towards her goals.   Pt prognosis is Good.     Pt will continue to benefit from skilled outpatient physical therapy to address the deficits listed in the problem list box on initial evaluation, provide pt/family education and to maximize pt's level of independence in the home and community environment.     Pt's spiritual, cultural and educational needs considered and pt agreeable to plan of care and goals.    Anticipated barriers to physical therapy: MG, diplopia    Goals:   Short Term Goals: 4 weeks   STG #1: inc R active cervical rot to 45 deg to assist with driving. MET, 63 DEG.  STG #2: inc R active cx lat flex to 20 deg to demo improved pain. MET, 26 DEG.  STG #3: in R shld active ROM flex to 110 deg to assist with reaching. MET, 130 DEG.  STG #4: Pt will be able to perform laundry and cleaning dishes without c/o numbness.     Long Term Goals:  8 weeks   FOTO impairment score improved to 50% or better given MG dx and imaging to improve QOL long term with MG. Met, 39% 3/7/19.    Plan     Cont to addressed deconditioning and cervical pain.    Aishwarya Castano, PT

## 2019-03-14 ENCOUNTER — CLINICAL SUPPORT (OUTPATIENT)
Dept: REHABILITATION | Facility: HOSPITAL | Age: 64
End: 2019-03-14
Payer: MEDICARE

## 2019-03-14 DIAGNOSIS — M54.2 NECK AND SHOULDER PAIN: ICD-10-CM

## 2019-03-14 DIAGNOSIS — M25.519 NECK AND SHOULDER PAIN: ICD-10-CM

## 2019-03-14 PROCEDURE — 97110 THERAPEUTIC EXERCISES: CPT | Mod: PO

## 2019-03-14 PROCEDURE — 97140 MANUAL THERAPY 1/> REGIONS: CPT | Mod: PO

## 2019-03-14 NOTE — PROGRESS NOTES
".  Physical Therapy Daily Treatment Note     Name: Annie Mason  Clinic Number: [id number]    Therapy Diagnosis:   Encounter Diagnosis   Name Primary?    Neck and shoulder pain      Physician: Dimitri Ireland Jr., MD    Visit Date: 3/14/2019  Physician Orders: PT Eval and Treat, neck-RUE  Medical Diagnosis from Referral:   M54.2 (ICD-10-CM) - Cervicalgia   M54.12 (ICD-10-CM) - Cervical radiculopathy      Evaluation Date: 2/4/2019  Authorization Period Expiration: 1/17/2020  Plan of Care Expiration: 4/05/19  Reassessment: 3/3/19  Visit # / Visits authorized: 6/20    Time In: 1000  Time Out: 1100  Total Billable Time: 50 minutes    Precautions: Standard, Diabetes and Fall    Subjective     Pt reports: Neurologist said she has carpal tunnel so the more she uses the arm, the more numbness she feels.  Pt reports the numbness isn't constant.  Pt reports the ice felt good but once the ice wore off.   She was compliant with home exercise program.  Response to previous treatment:  Some soreness  Functional change: none new noted    Pain: 2/10 pre tx, 0/10 post tx  Location: right neck  and shoulder      Objective     Annie received therapeutic exercises to develop strength, ROM and posture for 40 minutes including:    UBE 2'2'  Seated UT stretch R 3x30"   Seated chin tucks 20x no correction  Seated shld flex table slide 2x10-np  Standing shld flex wall slide 2x10  Supine AAROM press up 3x10 3# dowel  Supine AAROM B shld flex 3# dowel 3x10  Shld AROM flex 0# 2x10  Precor row 20# 2x10  B shld ER ytb 20x  Shoulder extension with ytb 2x10  Standing flex with ytb around wrist 10x    Manual therapy to the R occiput for 10 min consisting of STM to the R side neck and shld     Annie received cold pack for 10 minutes to to decrease circulation, pain, and swelling.    Home Exercises Provided and Patient Education Provided     Education provided:   - Cont HEP    Written Home Exercises Provided: Patient instructed to cont prior " HEP.  Exercises were reviewed and Annie was able to demonstrate them prior to the end of the session.  Annie demonstrated good  understanding of the education provided.     See EMR under Patient Instructions for exercises provided prior visit.    Assessment     Pt tolerated treatment well with increased fatigue, but no increase in pain.  Pt able to progress reps with multiple exercises and tolerated shoulder extension with a theraband.  Pt needs cueing to prevent upper trap compensations with most exercises.  Pt with difficulty self-correcting.  Annie is progressing well towards her goals.   Pt prognosis is Good.     Pt will continue to benefit from skilled outpatient physical therapy to address the deficits listed in the problem list box on initial evaluation, provide pt/family education and to maximize pt's level of independence in the home and community environment.     Pt's spiritual, cultural and educational needs considered and pt agreeable to plan of care and goals.    Anticipated barriers to physical therapy: MG, diplopia    Goals:   Short Term Goals: 4 weeks   STG #1: inc R active cervical rot to 45 deg to assist with driving. MET, 63 DEG.  STG #2: inc R active cx lat flex to 20 deg to demo improved pain. MET, 26 DEG.  STG #3: in R shld active ROM flex to 110 deg to assist with reaching. MET, 130 DEG.  STG #4: Pt will be able to perform laundry and cleaning dishes without c/o numbness.     Long Term Goals:  8 weeks   FOTO impairment score improved to 50% or better given MG dx and imaging to improve QOL long term with MG. Met, 39% 3/7/19.    Plan     Cont to addressed deconditioning and cervical pain.    Donna Rae, PTA

## 2019-03-15 ENCOUNTER — CLINICAL SUPPORT (OUTPATIENT)
Dept: REHABILITATION | Facility: HOSPITAL | Age: 64
End: 2019-03-15
Payer: MEDICARE

## 2019-03-15 DIAGNOSIS — M54.2 NECK AND SHOULDER PAIN: ICD-10-CM

## 2019-03-15 DIAGNOSIS — M25.519 NECK AND SHOULDER PAIN: ICD-10-CM

## 2019-03-15 PROCEDURE — 97140 MANUAL THERAPY 1/> REGIONS: CPT | Mod: PO

## 2019-03-15 PROCEDURE — 97110 THERAPEUTIC EXERCISES: CPT | Mod: PO

## 2019-03-15 NOTE — PROGRESS NOTES
".  Physical Therapy Daily Treatment Note     Name: Annie Mason  Clinic Number: [id number]    Therapy Diagnosis:   Encounter Diagnosis   Name Primary?    Neck and shoulder pain      Physician: Dimitri Ireland Jr., MD    Visit Date: 3/15/2019  Physician Orders: PT Eval and Treat, neck-RUE  Medical Diagnosis from Referral:   M54.2 (ICD-10-CM) - Cervicalgia   M54.12 (ICD-10-CM) - Cervical radiculopathy      Evaluation Date: 2/4/2019  Authorization Period Expiration: 1/17/2020  Plan of Care Expiration: 4/05/19  Reassessment: 3/3/19  Visit # / Visits authorized: 6/20    Time In: 1030 * pt with late arrival  Time Out: 1100  Total Billable Time: 30 minutes    Precautions: Standard, Diabetes and Fall    Subjective     Pt reports: Used a hot towel afterwards which seemed to help.  Pt feels like the most relief comes after the massage.  She was compliant with home exercise program.  Response to previous treatment:  Soreness  Functional change: none new noted    Pain: 2/10 pre tx, 0/10 post tx  Location: right neck  and shoulder      Objective     Annie received therapeutic exercises to develop strength, ROM and posture for 20 minutes including:    UBE 2'2'- NP  Seated UT stretch R 3x30"   Seated chin tucks 20x no correction  Seated shld flex table slide 2x10-NP  Standing shld flex wall slide 2x10- NP  Supine AAROM press up 3x10 3# dowel  Supine AAROM B shld flex 3# dowel 2x10 - cueing to keep elbows straight  Shld AROM flex 0# 2x10- NP  Precor row 20# 2x10  B shld ER ytb 20x  Shoulder extension with ytb 2x10- NP  Standing flex with ytb around wrist 10x- NP    Manual therapy to the R occiput for 10 min consisting of STM to the R side neck and shld     Annie received cold pack for 10 minutes to to decrease circulation, pain, and swelling.    Home Exercises Provided and Patient Education Provided     Education provided:   - Cont HEP    Written Home Exercises Provided: Patient instructed to cont prior HEP.  Exercises were " reviewed and Annie was able to demonstrate them prior to the end of the session.  Annie demonstrated good  understanding of the education provided.     See EMR under Patient Instructions for exercises provided prior visit.    Assessment     Pt tolerated treatment well with no complaints.  Pt's session was cut short due to patient being 30 minutes late.  Annie is progressing well towards her goals.   Pt prognosis is Good.     Pt will continue to benefit from skilled outpatient physical therapy to address the deficits listed in the problem list box on initial evaluation, provide pt/family education and to maximize pt's level of independence in the home and community environment.     Pt's spiritual, cultural and educational needs considered and pt agreeable to plan of care and goals.    Anticipated barriers to physical therapy: MG, diplopia    Goals:   Short Term Goals: 4 weeks   STG #1: inc R active cervical rot to 45 deg to assist with driving. MET, 63 DEG.  STG #2: inc R active cx lat flex to 20 deg to demo improved pain. MET, 26 DEG.  STG #3: in R shld active ROM flex to 110 deg to assist with reaching. MET, 130 DEG.  STG #4: Pt will be able to perform laundry and cleaning dishes without c/o numbness.     Long Term Goals:  8 weeks   FOTO impairment score improved to 50% or better given MG dx and imaging to improve QOL long term with MG. Met, 39% 3/7/19.    Plan     Cont to addressed deconditioning and cervical pain.    Donna Rae, PTA

## 2019-03-25 ENCOUNTER — CLINICAL SUPPORT (OUTPATIENT)
Dept: REHABILITATION | Facility: HOSPITAL | Age: 64
End: 2019-03-25
Payer: MEDICARE

## 2019-03-25 DIAGNOSIS — M25.519 NECK AND SHOULDER PAIN: ICD-10-CM

## 2019-03-25 DIAGNOSIS — M54.2 NECK AND SHOULDER PAIN: ICD-10-CM

## 2019-03-25 PROCEDURE — 97140 MANUAL THERAPY 1/> REGIONS: CPT | Mod: PO

## 2019-03-25 NOTE — PROGRESS NOTES
".  Physical Therapy Daily Treatment Note     Name: Annie Mason  Clinic Number: [id number]    Therapy Diagnosis:   Encounter Diagnosis   Name Primary?    Neck and shoulder pain      Physician: Dimitri Ireland Jr., MD    Visit Date: 3/25/2019  Physician Orders: PT Eval and Treat, neck-RUE  Medical Diagnosis from Referral:   M54.2 (ICD-10-CM) - Cervicalgia   M54.12 (ICD-10-CM) - Cervical radiculopathy      Evaluation Date: 2/4/2019  Authorization Period Expiration: 1/17/2020  Plan of Care Expiration: 4/05/19  Reassessment: 3/3/19  Visit # / Visits authorized: 7/20    Time In: 1002  Time Out: 1100  Total Billable Time: 30 minutes    Precautions: Standard, Diabetes and Fall    Subjective     Pt reports: neck has been feeling better overall. Having stomach issues so she really just wants to do the massage and go home.   She was compliant with home exercise program.  Response to previous treatment:  Soreness  Functional change: none new noted    Pain: 2/10 pre tx  Location: right neck  and shoulder      Objective     Annie received therapeutic exercises to develop strength, ROM and posture for 20 minutes including: not performed today    UBE 2'2'- NP  Seated UT stretch R 3x30"   Seated chin tucks 20x no correction  Seated shld flex table slide 2x10-NP  Standing shld flex wall slide 2x10- NP  Supine AAROM press up 3x10 3# dowel  Supine AAROM B shld flex 3# dowel 2x10 - cueing to keep elbows straight  Shld AROM flex 0# 2x10- NP  Precor row 20# 2x10  B shld ER ytb 20x  Shoulder extension with ytb 2x10- NP  Standing flex with ytb around wrist 10x- NP    Manual therapy to the R occiput and R upper trap for 15 min consisting of STM and sub occipital release    Annie received cold pack for 10 minutes to to decrease circulation, pain, and swelling. - NP    Home Exercises Provided and Patient Education Provided     Education provided:   - Cont HEP    Written Home Exercises Provided: Patient instructed to cont prior " HEP.  Exercises were reviewed and Annie was able to demonstrate them prior to the end of the session.  Annie demonstrated good  understanding of the education provided.     See EMR under Patient Instructions for exercises provided prior visit.    Assessment     Pt began to feel worse following manual so she requested to leave.  Annie is progressing well towards her goals.   Pt prognosis is Good.     Pt will continue to benefit from skilled outpatient physical therapy to address the deficits listed in the problem list box on initial evaluation, provide pt/family education and to maximize pt's level of independence in the home and community environment.     Pt's spiritual, cultural and educational needs considered and pt agreeable to plan of care and goals.    Anticipated barriers to physical therapy: MG, diplopia    Goals:   Short Term Goals: 4 weeks   STG #1: inc R active cervical rot to 45 deg to assist with driving. MET, 63 DEG.  STG #2: inc R active cx lat flex to 20 deg to demo improved pain. MET, 26 DEG.  STG #3: in R shld active ROM flex to 110 deg to assist with reaching. MET, 130 DEG.  STG #4: Pt will be able to perform laundry and cleaning dishes without c/o numbness.     Long Term Goals:  8 weeks   FOTO impairment score improved to 50% or better given MG dx and imaging to improve QOL long term with MG. Met, 39% 3/7/19.    Plan     Cont to addressed deconditioning and cervical pain.    Donna Rae, PTA

## 2019-03-28 ENCOUNTER — CLINICAL SUPPORT (OUTPATIENT)
Dept: REHABILITATION | Facility: HOSPITAL | Age: 64
End: 2019-03-28
Payer: MEDICARE

## 2019-03-28 ENCOUNTER — DOCUMENTATION ONLY (OUTPATIENT)
Dept: REHABILITATION | Facility: HOSPITAL | Age: 64
End: 2019-03-28

## 2019-03-28 ENCOUNTER — OFFICE VISIT (OUTPATIENT)
Dept: NEUROLOGY | Facility: CLINIC | Age: 64
End: 2019-03-28
Payer: MEDICARE

## 2019-03-28 VITALS
BODY MASS INDEX: 36.68 KG/M2 | HEART RATE: 69 BPM | DIASTOLIC BLOOD PRESSURE: 73 MMHG | WEIGHT: 207 LBS | HEIGHT: 63 IN | SYSTOLIC BLOOD PRESSURE: 135 MMHG

## 2019-03-28 DIAGNOSIS — M25.519 NECK AND SHOULDER PAIN: ICD-10-CM

## 2019-03-28 DIAGNOSIS — M54.2 NECK AND SHOULDER PAIN: ICD-10-CM

## 2019-03-28 DIAGNOSIS — G70.00 MYASTHENIA GRAVIS WITHOUT EXACERBATION: Primary | Chronic | ICD-10-CM

## 2019-03-28 PROCEDURE — 97110 THERAPEUTIC EXERCISES: CPT | Mod: PO | Performed by: PHYSICAL THERAPIST

## 2019-03-28 PROCEDURE — 99214 PR OFFICE/OUTPT VISIT, EST, LEVL IV, 30-39 MIN: ICD-10-PCS | Mod: S$PBB,,, | Performed by: PSYCHIATRY & NEUROLOGY

## 2019-03-28 PROCEDURE — 99999 PR PBB SHADOW E&M-EST. PATIENT-LVL III: CPT | Mod: PBBFAC,,, | Performed by: PSYCHIATRY & NEUROLOGY

## 2019-03-28 PROCEDURE — 99999 PR PBB SHADOW E&M-EST. PATIENT-LVL III: ICD-10-PCS | Mod: PBBFAC,,, | Performed by: PSYCHIATRY & NEUROLOGY

## 2019-03-28 PROCEDURE — 99214 OFFICE O/P EST MOD 30 MIN: CPT | Mod: S$PBB,,, | Performed by: PSYCHIATRY & NEUROLOGY

## 2019-03-28 PROCEDURE — 99213 OFFICE O/P EST LOW 20 MIN: CPT | Mod: PBBFAC,PN | Performed by: PSYCHIATRY & NEUROLOGY

## 2019-03-28 NOTE — PLAN OF CARE
"  Outpatient Therapy Discharge Summary     Name: Annie Mason  Clinic Number: [id number]    Therapy Diagnosis:   Encounter Diagnosis   Name Primary?    Neck and shoulder pain      Physician: Dimitri Ireland Jr., MD    Physician Orders: PT Eval and Treat, neck-RUE  Medical Diagnosis from Referral:   M54.2 (ICD-10-CM) - Cervicalgia   M54.12 (ICD-10-CM) - Cervical radiculopathy      Evaluation Date: 2/4/2019    Date of Last visit: 3/28/19  Total Visits Received: 8  Cancelled Visits: 2  No Show Visits: 1    Assessment    Goals: see below    Discharge reason: Other:  Has met most goals and reach max potential likely given several comorbidities.    Plan   This patient is discharged from Physical Therapy.    Physical Therapy Daily Treatment Note      Name: Annie Mason  Clinic Number: [id number]     Therapy Diagnosis:        Encounter Diagnosis   Name Primary?    Neck and shoulder pain        Physician: Dimitri Ireland Jr., MD     Visit Date: 3/28/2019  Physician Orders: PT Eval and Treat, neck-RUE  Medical Diagnosis from Referral:   M54.2 (ICD-10-CM) - Cervicalgia   M54.12 (ICD-10-CM) - Cervical radiculopathy      Evaluation Date: 2/4/2019  Authorization Period Expiration: 1/17/2020  Plan of Care Expiration: 4/05/19  Reassessment: 3/3/19  Visit # / Visits authorized: 8/20     Time In: 1021  Time Out: 1103  Total Billable Time: 40 minutes     Precautions: Standard, Diabetes and Fall     Subjective      Pt reports: feeling much better, but numbness in R forearm continues. MG causes her to become fatigued.  She was compliant with home exercise program.  Response to previous treatment:  Soreness  Functional change: none new noted     Pain: 2/10 pre tx and post tx  Location: right neck  and shoulder       Objective      Annie received therapeutic exercises to develop strength, ROM and posture for 35 minutes including:      UBE 2'2'  Review of HEP. See handout in media     np:  Seated UT stretch R 3x30"   Seated chin " tucks 20x no correction  Seated shld flex table slide 2x10-NP  Standing shld flex wall slide 2x10- NP  Supine AAROM press up 3x10 3# dowel  Supine AAROM B shld flex 3# dowel 2x10 - cueing to keep elbows straight  Shld AROM flex 0# 2x10- NP  Precor row 20# 2x10  B shld ER ytb 20x  Shoulder extension with ytb 2x10- NP  Standing flex with ytb around wrist 10x- NP     Manual therapy to the R occiput and R upper trap for 5 min consisting of STM R UT.     Annie received cold pack for 00 minutes to to decrease circulation, pain, and swelling.      Home Exercises Provided and Patient Education Provided      Education provided:   - Cont HEP, red and green band given.  - Role and benefit to PT. Pt responsibility to comply with HEP and correct posture.      Written Home Exercises Provided: yes.  Exercises were reviewed and Annie was able to demonstrate them prior to the end of the session.  Annie demonstrated good  understanding of the education provided.      See EMR under Media for exercises provided 3/28/19.     Assessment      Pt arrived late stating she was coming from another Dr. Mireles. Pt has been feeling better and feels (I) with HEP. During pt education, pt adamant about returning to PT if sx return. She was informed to cont HEP and return if HEP is no longer helping. She seems convinced she needs continued manual therapy given MG dx. PT encouraged pt to comply with HEP, and she should only return if the HEP is no longer helping. PT also informed pt postural correction is key. She has yet to correct this though has proven she can with cues.  Annie is progressing well towards her goals.   Pt prognosis is Good.      Pt will continue to benefit from skilled outpatient physical therapy to address the deficits listed in the problem list box on initial evaluation, provide pt/family education and to maximize pt's level of independence in the home and community environment.      Pt's spiritual, cultural and educational  needs considered and pt agreeable to plan of care and goals.     Anticipated barriers to physical therapy: MG, diplopia     Goals:   Short Term Goals: 4 weeks   STG #1: inc R active cervical rot to 45 deg to assist with driving. MET, 63 DEG.  STG #2: inc R active cx lat flex to 20 deg to demo improved pain. MET, 26 DEG.  STG #3: in R shld active ROM flex to 110 deg to assist with reaching. MET, 130 DEG.  STG #4: Pt will be able to perform laundry and cleaning dishes without c/o numbness. NOT MET. NUMBNESS CONTINUES. SHE STATES POSSIBLE CTS.     Long Term Goals:  8 weeks   FOTO impairment score improved to 50% or better given MG dx and imaging to improve QOL long term with MG. Met, 39% 3/7/19.     Plan      DISCHARGED TO [facility].     Aishwarya Castano, PT

## 2019-03-28 NOTE — PROGRESS NOTES
".  Physical Therapy Daily Treatment Note     Name: Annie Mason  Clinic Number: [id number]    Therapy Diagnosis:   Encounter Diagnosis   Name Primary?    Neck and shoulder pain      Physician: Dimitri Irleand Jr., MD    Visit Date: 3/28/2019  Physician Orders: PT Eval and Treat, neck-RUE  Medical Diagnosis from Referral:   M54.2 (ICD-10-CM) - Cervicalgia   M54.12 (ICD-10-CM) - Cervical radiculopathy      Evaluation Date: 2/4/2019  Authorization Period Expiration: 1/17/2020  Plan of Care Expiration: 4/05/19  Reassessment: 3/3/19  Visit # / Visits authorized: 8/20    Time In: 1021  Time Out: 1103  Total Billable Time: 40 minutes    Precautions: Standard, Diabetes and Fall    Subjective     Pt reports: feeling much better, but numbness in R forearm continues. MG causes her to become fatigued.  She was compliant with home exercise program.  Response to previous treatment:  Soreness  Functional change: none new noted    Pain: 2/10 pre tx and post tx  Location: right neck  and shoulder      Objective     Annie received therapeutic exercises to develop strength, ROM and posture for 35 minutes including:     UBE 2'2'  Review of HEP. See handout in media    np:  Seated UT stretch R 3x30"   Seated chin tucks 20x no correction  Seated shld flex table slide 2x10-NP  Standing shld flex wall slide 2x10- NP  Supine AAROM press up 3x10 3# dowel  Supine AAROM B shld flex 3# dowel 2x10 - cueing to keep elbows straight  Shld AROM flex 0# 2x10- NP  Precor row 20# 2x10  B shld ER ytb 20x  Shoulder extension with ytb 2x10- NP  Standing flex with ytb around wrist 10x- NP    Manual therapy to the R occiput and R upper trap for 5 min consisting of STM R UT.    Annie received cold pack for 00 minutes to to decrease circulation, pain, and swelling.     Home Exercises Provided and Patient Education Provided     Education provided:   - Cont HEP, red and green band given.  - Role and benefit to PT. Pt responsibility to comply with HEP and " correct posture.     Written Home Exercises Provided: yes.  Exercises were reviewed and Annie was able to demonstrate them prior to the end of the session.  Annie demonstrated good  understanding of the education provided.     See EMR under Media for exercises provided 3/28/19.    Assessment     Pt arrived late stating she was coming from another DrAyse Mireles. Pt has been feeling better and feels (I) with HEP. During pt education, pt adamant about returning to PT if sx return. She was informed to cont HEP and return if HEP is no longer helping. She seems convinced she needs continued manual therapy given MG dx. PT encouraged pt to comply with HEP, and she should only return if the HEP is no longer helping. PT also informed pt postural correction is key. She has yet to correct this though has proven she can with cues.  Annie is progressing well towards her goals.   Pt prognosis is Good.     Pt will continue to benefit from skilled outpatient physical therapy to address the deficits listed in the problem list box on initial evaluation, provide pt/family education and to maximize pt's level of independence in the home and community environment.     Pt's spiritual, cultural and educational needs considered and pt agreeable to plan of care and goals.    Anticipated barriers to physical therapy: MG, diplopia    Goals:   Short Term Goals: 4 weeks   STG #1: inc R active cervical rot to 45 deg to assist with driving. MET, 63 DEG.  STG #2: inc R active cx lat flex to 20 deg to demo improved pain. MET, 26 DEG.  STG #3: in R shld active ROM flex to 110 deg to assist with reaching. MET, 130 DEG.  STG #4: Pt will be able to perform laundry and cleaning dishes without c/o numbness. NOT MET. NUMBNESS CONTINUES. SHE STATES POSSIBLE CTS.     Long Term Goals:  8 weeks   FOTO impairment score improved to 50% or better given MG dx and imaging to improve QOL long term with MG. Met, 39% 3/7/19.    Plan     DISCHARGED TO [facility].    Aishwarya  Eyad, PT

## 2019-03-28 NOTE — PROGRESS NOTES
PT/PTA met face to face to discuss pt's treatment plan and progress towards established goals. Pt will be seen by a physical therapist minimally every 6th visit or every 30 days.      Aishwarya Castano PT, DPT, MTC    Face to face meeting completed with Aishwarya Castano PT, regarding current status and progress of   Annie Rae PTA .

## 2019-03-28 NOTE — PROGRESS NOTES
Date of service:  3/28/2019    Chief complaint:  Myasthenia gravis    Interval history:  The patient is a 63 y.o. female seen previously for MG, originally diagnosed in 1992 who is S/P thymectomy.  She has continued on IVIG.  She has seen significant improvement since starting that therapy, and she feels that the q4week infusion regimen has bene helpful for her.   Regarding her CTS and peripheral neuropathy, this has been largely stable.  Of note, she has not been taking her Cymbalta.  Finally, regarding her neck pain with radiation to the right arm, this has improved with therapy.    History of present illness:  The patient is a 63 y.o. female referred for evaluation of myasthenia gravis. She states that she was diagnosed with this in 1992.  She was seen for this in [place] by Dr. Evans.  Her workup included a Tensilon test.  She is not sure if antibodies were sent.  She never had RNS.  Her symptoms at that time involved diplopia and generalized weakness.  Her management has consisted of steroids, Mestinon, plasmaphoresis, IVIG, and thymectomy.  She she is not sure if she had a thymoma.  Presently, she continues to have issues with weakness in the extremities, ptosis, and difficulty swallowing (both solids and liquids) at times.  She feels as though she has been doing relatively well recently.  Her previous neurologist apparently told her to take the Mestinon on a prn basis.  She has had some issues with GI discomfort from taking the Mestinon periodically.  She is not on any scheduled immunotherapy but has been receiving IVIG treatments as rescue repeatedly in recent years.        Past Medical History:   Diagnosis Date    Allergy     Anticoagulant long-term use     Brachial plexitis     Depression     Diabetes mellitus, type 2     GERD (gastroesophageal reflux disease)     Hyperlipidemia 2/4/2015    Hypertension     Hypothyroidism     MG (myasthenia gravis)     Mild intermittent asthma with acute  "exacerbation 12/26/2017    Myasthenia gravis without exacerbation 3/25/2015    Seizures     "years ago"    Stroke     2008       Past Surgical History:   Procedure Laterality Date    ANGIOGRAM, CORONARY ARTERY N/A 1/28/2019    [facility]    CHOLECYSTECTOMY      YZZCVNFNA-[id number]-T-CATH N/A 12/14/2016    [facility]    Left heart cath Left 1/28/2019    [facility] C[facility]    PORTACATH PLACEMENT  12/14/2016    thalmusectomy      TUBAL LIGATION     Thymectomy, not thalmusectomy      Family History   Problem Relation Age of Onset    Diabetes Mother     Hypertension Mother     Hypertension Father     Stroke Father     Ovarian cancer Maternal Aunt        Social History     Socioeconomic History    Marital status: Single     Spouse name: Not on file    Number of children: 12    Years of education: Not on file    Highest education level: Not on file   Occupational History    Occupation: disabled   Social Needs    Financial resource strain: Not on file    Food insecurity:     Worry: Not on file     Inability: Not on file    Transportation needs:     Medical: Not on file     Non-medical: Not on file   Tobacco Use    Smoking status: Never Smoker    Smokeless tobacco: Never Used   Substance and Sexual Activity    Alcohol use: No    Drug use: No    Sexual activity: Not on file   Lifestyle    Physical activity:     Days per week: Not on file     Minutes per session: Not on file    Stress: Not on file   Relationships    Social connections:     Talks on phone: Not on file     Gets together: Not on file     Attends Anabaptist service: Not on file     Active member of club or organization: Not on file     Attends meetings of clubs or organizations: Not on file     Relationship status: Not on file    Intimate partner violence:     Fear of current or ex partner: Not on file     Emotionally abused: Not on file " "    Physically abused: Not on file     Forced sexual activity: Not on file   Other Topics Concern    Not on file   Social History Narrative    Had 12 living children, did lose a set of triplets that were very premature; 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   Denies T/E/D    Review of Systems  General/Constitutional:  No unintentional weight loss, No change in appetite  Eyes/Vision:  No change in vision, No double vision  ENT:  No frequent nose bleeds, No ringing in the ears  Respiratory:  No cough, No wheezing  Cardiovascular:  No chest pain, No palpitations  Gastrointestinal:  No jaundice, + nausea/vomiting  Genitourinary:  No incontinence, No burning with urination  Hematologic/Lymphatic:  + easy bruising/bleeding, No night sweats  Neurological:  No numbness, No weakness  Endocrine:  No fatigue, + heat/cold intolerance  Allergy/Immunologic:  No fevers, No chills  Musculoskeletal:  No muscle pain, No joint pain   Psychiatric:  No thoughts of harming self/others, No depression  Integumentary:  No rashes, No sores that do not heal     Physical exam:  /73   Pulse 69   Ht 5' 3" (1.6 m)   Wt 93.9 kg (207 lb)   BMI 36.67 kg/m²    General: Well developed, obese.  No acute distress.  HEENT: Atraumatic, normocephalic.  Neck: Supple, trachea midline.  Cardiovascular: Regular rate and rhythm.  Pulmonary: No increased work of breathing.  Able to count to 35 in a single breath.  Abdomen/GI: No guarding.  Musculoskeletal: No obvious joint deformities, moves all extremities well.    Neurological exam:  Mental status:  Awake and alert.  Oriented x4.  Speech fluent and appropriate.  Somewhat hoarse.  Recent and remote memory appear to be intact.  Fund of knowledge normal.  Cranial nerves: Pupils equal round and reactive to light, extraocular movements intact, facial strength and sensation intact bilaterally, palate and tongue midline, hearing grossly intact bilaterally.  Motor: 5 out of 5 strength " throughout the upper and lower extremities bilaterally without fatigability noted on pump handle. Normal bulk and tone.  Sensation: Intact to light touch and temperature bilaterally.  DTR: 1+ at the knees and biceps bilaterally.  Coordination: Finger-nose-finger testing intact bilaterally.  Gait: Antalgic gait.    Data base:  Records from her visit in the neuromuscular clinic at [facility] were not available for review at the time of our visit.  We will request them.    Recent labs include a BMP with mild hyperglycemia.  Antibodies for MG were negative.    Assessment and plan:  The patient is a 63 y.o. female with a reported history of MG.  I think she does have this disorder.  We will continue her IVIG every 4 weeks as she is happy with how she is feeling.  She is to continue taking her Mestinon TID.  We will check serologies.  She understands that if her breathing worsens, her speech changes, swallowing issues/choking arises, she begins to have falls, or any other safety issues arise, she should present immediately to the ER.      Regarding her CTS and neuropathy, we will resume her Cymbalta.  Medication side effects were discussed.  We also discussed wrist splints for the former. If she fails conservative management, we will consider referral to neurosurgery for her CTS.    Regarding her cervicalgia, we will start with conservative management.  Hopefully, PT, NSAIDs, and Cymbalta will help.  If it has not improved in several weeks, we will check an EMG.    We will plan on seeing the patient back in a few months.

## 2019-05-28 RX ORDER — DULOXETIN HYDROCHLORIDE 20 MG/1
CAPSULE, DELAYED RELEASE ORAL
Qty: 30 CAPSULE | Refills: 2 | OUTPATIENT
Start: 2019-05-28

## 2019-08-22 ENCOUNTER — TELEPHONE (OUTPATIENT)
Dept: NEUROLOGY | Facility: CLINIC | Age: 64
End: 2019-08-22

## 2019-08-22 NOTE — TELEPHONE ENCOUNTER
----- Message from Yesi Looney sent at 8/22/2019  8:52 AM CDT -----  Contact: Debbie santiago/ Infusion Center @ [facility]  Type: Needs Medical Advice    Who Called:  Debbie santiago/ [facility]  Best Call Back Number: [phone]  Additional Information: requesting orders for IVIG to be faxed to [phone] by Friday--please advise--thank you

## 2019-10-21 ENCOUNTER — TELEPHONE (OUTPATIENT)
Dept: NEUROLOGY | Facility: CLINIC | Age: 64
End: 2019-10-21

## 2019-10-21 DIAGNOSIS — Z45.2 ENCOUNTER FOR CENTRAL LINE CARE: Primary | ICD-10-CM

## 2019-10-21 NOTE — TELEPHONE ENCOUNTER
Meggan from infusion center called stating they are having trouble accessing the pt's port. The disc part and tubing are malfunctioning.  Order number is [id number] Fl central ling tunnel contrast injection with port or pump with imaging and report.

## 2019-10-24 ENCOUNTER — TELEPHONE (OUTPATIENT)
Dept: NEUROLOGY | Facility: CLINIC | Age: 64
End: 2019-10-24

## 2019-10-24 NOTE — TELEPHONE ENCOUNTER
Attempted to reach the pt, to inform her see needs to be on time for her infusions.  Voice mail not set up.

## 2019-10-28 PROBLEM — M25.552 CHRONIC HIP PAIN, BILATERAL: Status: ACTIVE | Noted: 2019-10-28

## 2019-10-28 PROBLEM — G89.29 CHRONIC HIP PAIN, BILATERAL: Status: ACTIVE | Noted: 2019-10-28

## 2019-10-28 PROBLEM — Z91.199 NO-SHOW FOR APPOINTMENT: Status: RESOLVED | Noted: 2019-01-08 | Resolved: 2019-10-28

## 2019-10-28 PROBLEM — M25.551 CHRONIC HIP PAIN, BILATERAL: Status: ACTIVE | Noted: 2019-10-28

## 2019-11-13 ENCOUNTER — OFFICE VISIT (OUTPATIENT)
Dept: SURGERY | Facility: CLINIC | Age: 64
End: 2019-11-13
Payer: MEDICARE

## 2019-11-13 VITALS
RESPIRATION RATE: 16 BRPM | HEIGHT: 62 IN | DIASTOLIC BLOOD PRESSURE: 69 MMHG | BODY MASS INDEX: 39.15 KG/M2 | SYSTOLIC BLOOD PRESSURE: 151 MMHG | WEIGHT: 212.75 LBS | TEMPERATURE: 99 F | HEART RATE: 67 BPM

## 2019-11-13 DIAGNOSIS — Z09 POSTOP CHECK: Primary | ICD-10-CM

## 2019-11-13 PROCEDURE — 99999 PR PBB SHADOW E&M-EST. PATIENT-LVL III: CPT | Mod: PBBFAC,,, | Performed by: SURGERY

## 2019-11-13 PROCEDURE — 99024 PR POST-OP FOLLOW-UP VISIT: ICD-10-PCS | Mod: POP,,, | Performed by: SURGERY

## 2019-11-13 PROCEDURE — 99024 POSTOP FOLLOW-UP VISIT: CPT | Mod: POP,,, | Performed by: SURGERY

## 2019-11-13 PROCEDURE — 99999 PR PBB SHADOW E&M-EST. PATIENT-LVL III: ICD-10-PCS | Mod: PBBFAC,,, | Performed by: SURGERY

## 2019-11-13 PROCEDURE — 99213 OFFICE O/P EST LOW 20 MIN: CPT | Mod: PBBFAC,PO | Performed by: SURGERY

## 2019-11-13 RX ORDER — SYRING-NEEDL,DISP,INSUL,0.3 ML 31GX15/64"
SYRINGE, EMPTY DISPOSABLE MISCELLANEOUS
Refills: 3 | COMMUNITY
Start: 2019-10-19

## 2019-11-13 NOTE — PROGRESS NOTES
Pleasant 62 yo F whom I am familiar with.  Pt with history of myasthenia gravis.  Pt had port placed by me in 12/16.  She states that last month when attempted to be used she had swelling and discomfort.  She presents for evaluation      AFVSS  AAOx3  CTA B  Soft/nt/nd  Port in place     Xray: No extravasation.  POrt flushes    A/P: POrt in place    Based on tunneled cath study port appears to be functioning with no extravasation.  Would not revise at present.  PT to f/u with mep rn

## 2020-01-16 ENCOUNTER — TELEPHONE (OUTPATIENT)
Dept: NEUROLOGY | Facility: CLINIC | Age: 65
End: 2020-01-16

## 2020-01-16 NOTE — TELEPHONE ENCOUNTER
----- Message from Nini Ruiz sent at 1/16/2020  3:20 PM CST -----  Contact: Patient  Type: Needs Medical Advice    Who Called:  pt  Best Call Back Number: [phone]  Additional Information: Patient is calling to schedule an appt. For her MG.Please call back and advise.

## 2020-02-03 ENCOUNTER — TELEPHONE (OUTPATIENT)
Dept: ENDOCRINOLOGY | Facility: CLINIC | Age: 65
End: 2020-02-03

## 2020-02-03 ENCOUNTER — LAB VISIT (OUTPATIENT)
Dept: LAB | Facility: HOSPITAL | Age: 65
End: 2020-02-03
Attending: INTERNAL MEDICINE
Payer: MEDICARE

## 2020-02-03 ENCOUNTER — OFFICE VISIT (OUTPATIENT)
Dept: ENDOCRINOLOGY | Facility: CLINIC | Age: 65
End: 2020-02-03
Payer: MEDICARE

## 2020-02-03 VITALS
BODY MASS INDEX: 38.64 KG/M2 | HEIGHT: 62 IN | SYSTOLIC BLOOD PRESSURE: 130 MMHG | OXYGEN SATURATION: 97 % | DIASTOLIC BLOOD PRESSURE: 80 MMHG | WEIGHT: 210 LBS | HEART RATE: 69 BPM

## 2020-02-03 DIAGNOSIS — G60.3 IDIOPATHIC PROGRESSIVE NEUROPATHY: ICD-10-CM

## 2020-02-03 DIAGNOSIS — E55.9 VITAMIN D DEFICIENCY: Chronic | ICD-10-CM

## 2020-02-03 DIAGNOSIS — E11.65 TYPE 2 DIABETES MELLITUS WITH HYPERGLYCEMIA, WITH LONG-TERM CURRENT USE OF INSULIN: ICD-10-CM

## 2020-02-03 DIAGNOSIS — E21.3 HYPERPARATHYROIDISM: Primary | ICD-10-CM

## 2020-02-03 DIAGNOSIS — E83.52 HYPERCALCEMIA: ICD-10-CM

## 2020-02-03 DIAGNOSIS — E55.9 HYPOVITAMINOSIS D: ICD-10-CM

## 2020-02-03 DIAGNOSIS — E11.65 TYPE 2 DIABETES MELLITUS WITH HYPERGLYCEMIA, WITH LONG-TERM CURRENT USE OF INSULIN: Primary | ICD-10-CM

## 2020-02-03 DIAGNOSIS — E78.2 MIXED HYPERLIPIDEMIA: ICD-10-CM

## 2020-02-03 DIAGNOSIS — Z79.4 TYPE 2 DIABETES MELLITUS WITH HYPERGLYCEMIA, WITH LONG-TERM CURRENT USE OF INSULIN: Primary | ICD-10-CM

## 2020-02-03 DIAGNOSIS — E03.4 HYPOTHYROIDISM DUE TO ACQUIRED ATROPHY OF THYROID: ICD-10-CM

## 2020-02-03 DIAGNOSIS — Z79.4 TYPE 2 DIABETES MELLITUS WITH HYPERGLYCEMIA, WITH LONG-TERM CURRENT USE OF INSULIN: ICD-10-CM

## 2020-02-03 DIAGNOSIS — I10 BENIGN ESSENTIAL HTN: ICD-10-CM

## 2020-02-03 PROCEDURE — 36415 COLL VENOUS BLD VENIPUNCTURE: CPT | Mod: PO

## 2020-02-03 PROCEDURE — 99999 PR PBB SHADOW E&M-EST. PATIENT-LVL III: CPT | Mod: PBBFAC,,, | Performed by: INTERNAL MEDICINE

## 2020-02-03 PROCEDURE — 83036 HEMOGLOBIN GLYCOSYLATED A1C: CPT

## 2020-02-03 PROCEDURE — 82607 VITAMIN B-12: CPT

## 2020-02-03 PROCEDURE — 83970 ASSAY OF PARATHORMONE: CPT

## 2020-02-03 PROCEDURE — 99204 PR OFFICE/OUTPT VISIT, NEW, LEVL IV, 45-59 MIN: ICD-10-PCS | Mod: S$PBB,,, | Performed by: INTERNAL MEDICINE

## 2020-02-03 PROCEDURE — 99204 OFFICE O/P NEW MOD 45 MIN: CPT | Mod: S$PBB,,, | Performed by: INTERNAL MEDICINE

## 2020-02-03 PROCEDURE — 99999 PR PBB SHADOW E&M-EST. PATIENT-LVL III: ICD-10-PCS | Mod: PBBFAC,,, | Performed by: INTERNAL MEDICINE

## 2020-02-03 PROCEDURE — 99213 OFFICE O/P EST LOW 20 MIN: CPT | Mod: PBBFAC,PO | Performed by: INTERNAL MEDICINE

## 2020-02-03 PROCEDURE — 82306 VITAMIN D 25 HYDROXY: CPT

## 2020-02-03 PROCEDURE — 80053 COMPREHEN METABOLIC PANEL: CPT

## 2020-02-03 RX ORDER — INSULIN PUMP SYRINGE, 3 ML
EACH MISCELLANEOUS
Qty: 1 EACH | Refills: 0 | Status: SHIPPED | OUTPATIENT
Start: 2020-02-03 | End: 2020-02-04

## 2020-02-03 NOTE — LETTER
February 4, 2020      Sherice Gerardo MD  [address] [place]  [facility] [address]           [facility]  [address] [place] [address]  Phone: [phone]  Fax: [phone]          Patient: Annie Mason   MR Number: [id number]   YOB: 1955   Date of Visit: 2/3/2020       Dear Dr. Sherice Gerardo:    Thank you for referring Annie Mason to me for evaluation. Attached you will find relevant portions of my assessment and plan of care.    If you have questions, please do not hesitate to call me. I look forward to following Annie Mason along with you.    Sincerely,    Juliette L. Sandifer Kum-Nji, MD    Enclosure  CC:  No Recipients    If you would like to receive this communication electronically, please contact [email or web address] or [phone] to request more information on [facility] access.    For providers and/or their staff who would like to refer a patient to [facility], please contact us through our one-stop-shop provider referral line, [facility], at [phone].    If you feel you have received this communication in error or would no longer like to receive these types of communications, please e-mail [email or web address]          Eirck Matt MD : son/health care agent

## 2020-02-03 NOTE — PROGRESS NOTES
"    Subjective:    Patient ID:  Annie Mason is a 64 y.o. female.    Chief Complaint:  Diabetes      Pt presents to establish care for diabetes    Onset of Type 2 diabetes mellitus was < 10 years ago. Has history of CVA but no other macrovascular complications. No microvascular complications. No history of DKA.   Cardiovascular risk factors: advanced age (older than 55 for men, 65 for women), diabetes mellitus, dyslipidemia, hypertension and sedentary lifestyle  Currently takin) Metformin 750 mg once daily  2) Humalog 10 TID  3) BASAGLAR 50 units q HS    She has not been checking blood sugar lately due to giving her meter to her son. Recall in the past, -250's, pre-lunch 153, pre-dinner 200-300. Does not check blood sugar at bedtime.   Current diet: in general eats a "healthy" diet, on average has 2 meals per day. Usually eats a breakfast.  Current exercise: no regular exercise  Any episodes of hypoglycemia? No but notes she feels "hungry all at once."  This usually occurs when she misses lunch around 2-3 pm. Does not check BG during these times. Denies snacking. Does not eat a lot of junk food. Drinks sugary sodas rarely. Metformin gives her loose stool. Only taking once daily at night.    Last eye exam was about 1 year ago. Has appt on the . No hx of DR per pt. No history of pancreatitis or MEN syndrome.       History of hypothyroidism and myasthenia gravis. Takes levothyroxine. No issues swallowing or voice changes.        Diabetes Management Status    Statin: Taking  ACE/ARB: Not taking    Screening or Prevention Patient's value Goal Complete/Controlled?   HgA1C Testing and Control   Lab Results   Component Value Date    HGBA1C 10.7 (H) 2020      Annually/Less than 8% No   Lipid profile : 2019 Annually Yes   LDL control Lab Results   Component Value Date    LDLCALC 116.4 2019    Annually/Less than 100 mg/dl  No   Nephropathy screening Lab Results   Component Value Date    " "LABMICR <6.0 07/18/2019     Lab Results   Component Value Date    PROTEINUA Negative 07/19/2019    Annually Yes   Blood pressure BP Readings from Last 1 Encounters:   02/03/20 130/80    Less than 140/90 Yes   Dilated retinal exam : 01/15/2018 Annually Yes   Foot exam   : 01/30/2020 Annually Yes       Review of Systems   Constitutional: Negative for unexpected weight change.   Eyes: Positive for visual disturbance.   Respiratory: Negative for shortness of breath.    Cardiovascular: Positive for leg swelling. Negative for chest pain.   Gastrointestinal: Positive for abdominal distention. Negative for abdominal pain.   Endocrine: Positive for polyphagia. Negative for polydipsia and polyuria.   Genitourinary: Positive for urgency.   Musculoskeletal: Negative for myalgias.   Skin: Negative for wound.   Neurological: Negative for headaches.   Hematological: Does not bruise/bleed easily.   Psychiatric/Behavioral: Negative for sleep disturbance.        Past Medical History:   Diagnosis Date    Allergy     Anticoagulant long-term use     Brachial plexitis     Depression     Diabetes mellitus, type 2     GERD (gastroesophageal reflux disease)     Hyperlipidemia 2/4/2015    Hypertension     Hypothyroidism     MG (myasthenia gravis)     Mild intermittent asthma with acute exacerbation 12/26/2017    Myasthenia gravis without exacerbation 3/25/2015    Seizures     "years ago"    Stroke     2008      Social History     Tobacco Use    Smoking status: Never Smoker    Smokeless tobacco: Never Used   Substance Use Topics    Alcohol use: No    Drug use: No     Family History   Problem Relation Age of Onset    Diabetes Mother     Hypertension Mother     Hypertension Father     Stroke Father     Ovarian cancer Maternal Aunt     Myasthenia gravis Paternal Aunt     Diabetes type I Son       Past Surgical History:   Procedure Laterality Date    CHOLECYSTECTOMY      CORONARY ANGIOGRAPHY N/A 1/28/2019    Procedure: " "ANGIOGRAM, CORONARY ARTERY;  Surgeon: Ike Jasso III, MD;  Location: [facility] CATH;  Service: Cardiology;  Laterality: N/A;    LEFT HEART CATHETERIZATION Left 1/28/2019    Procedure: Left heart cath;  Surgeon: Ike Jasso III, MD;  Location: [facility] CATH;  Service: Cardiology;  Laterality: Left;    PORTACATH PLACEMENT  12/14/2016    thalmusectomy      TUBAL LIGATION            Current Outpatient Medications:     albuterol (PROVENTIL) 2.5 mg /3 mL (0.083 %) nebulizer solution, Take 2.5 mg by nebulization every 6 (six) hours as needed for Wheezing. Rescue, Disp: , Rfl:     albuterol sulfate (PROAIR RESPICLICK) 90 mcg/actuation AePB, Inhale 180 mcg into the lungs every 4 (four) hours. Rescue, Disp: , Rfl:     aspirin (ECOTRIN) 81 MG EC tablet, Take 81 mg by mouth once daily., Disp: , Rfl:     atorvastatin (LIPITOR) 40 MG tablet, Take 1 tablet (40 mg total) by mouth once daily., Disp: 90 tablet, Rfl: 3    BD VEO INSULIN SYRINGE UF 0.3 mL 31 gauge x 15/64" Syrg, USE TO INJECT NOVOLOG 3 TIMES DAILY, Disp: , Rfl: 3    blood pressure monitor Kit, Use to check blood pressure daily, Disp: , Rfl: 0    carvedilol (COREG) 25 MG tablet, TAKE 1 TABLET BY MOUTH TWICE A DAY WITH FOOD, Disp: 180 tablet, Rfl: 3    cyanocobalamin 1,000 mcg/mL injection, 1000 mcg subcutaneous weekly for 4 weeks for 1 month then monthly, Disp: 10 mL, Rfl: 3    diclofenac sodium (VOLTAREN) 1 % Gel, Apply 2 g topically 3 (three) times daily. To neck for pain, Disp: 100 g, Rfl: 0    DULoxetine (CYMBALTA) 60 MG capsule, Take 1 capsule (60 mg total) by mouth once daily. (Patient taking differently: Take 20 mg by mouth once daily. ), Disp: 90 capsule, Rfl: 3    fluocinonide 0.05% (LIDEX) 0.05 % cream, Apply topically 2 (two) times daily., Disp: 1 Tube, Rfl: 3    fluticasone (FLONASE) 50 mcg/actuation nasal spray, 2 sprays each  nostril bid x 1 week then q day prn  nasal congestion or sneezing (Patient taking differently: as needed. 2 sprays " "each  nostril bid x 1 week then q day prn  nasal congestion or sneezing), Disp: 1 Bottle, Rfl: 3    fluticasone-vilanterol (BREO ELLIPTA) 200-25 mcg/dose DsDv diskus inhaler, Inhale 1 puff into the lungs once daily. Controller, Disp: , Rfl:     furosemide (LASIX) 40 MG tablet, TAKE 1 TABLET BY MOUTH EVERY DAY, Disp: 90 tablet, Rfl: 1    hydroCHLOROthiazide (HYDRODIURIL) 25 MG tablet, TAKE 1 TABLET BY MOUTH EVERY DAY IN THE MORNING, Disp: 90 tablet, Rfl: 3    hydrocodone-acetaminophen 5-325mg (NORCO) 5-325 mg per tablet, Take 1 tablet by mouth every 6 (six) hours as needed for Pain., Disp: 30 tablet, Rfl: 0    insulin (BASAGLAR KWIKPEN U-100 INSULIN) glargine 100 units/mL (3mL) SubQ pen, Inject 56 Units into the skin every evening., Disp: 3 Box, Rfl: 3    insulin aspart U-100 (NOVOLOG U-100 INSULIN ASPART) 100 unit/mL injection, Inject 7 Units into the skin 3 (three) times daily before meals., Disp: 20 mL, Rfl: 0    insulin syringe-needle U-100 (BD VEO INSULIN SYRINGE UF) 1 mL 31 gauge x 15/64" Syrg, Use to inject Novolog 3 times daily, Disp: 300 Syringe, Rfl: 3    lancets (ONETOUCH DELICA LANCETS) 33 gauge Misc, 1 lancet by Misc.(Non-Drug; Combo Route) route once daily., Disp: 100 each, Rfl: 3    levothyroxine (SYNTHROID) 50 MCG tablet, TAKE 1 TABLET BY MOUTH EVERY DAY, Disp: 30 tablet, Rfl: 0    loratadine (CLARITIN) 10 mg tablet, TAKE 1 TABLET ONCE A DAY AS NEEDED FOR ALLERGIES, Disp: 90 tablet, Rfl: 2    magnesium 30 mg Tab, Take by mouth once., Disp: , Rfl:     meloxicam (MOBIC) 7.5 MG tablet, Take 7.5 mg by mouth once daily. FOR 7 DAYS, Disp: , Rfl: 0    metFORMIN (GLUCOPHAGE-XR) 750 MG 24 hr tablet, TAKE 1 TABLET (750 MG TOTAL) BY MOUTH 2 (TWO) TIMES DAILY WITH A MEAL, Disp: 180 tablet, Rfl: 3    multivitamin (ONE DAILY MULTIVITAMIN) per tablet, Take 1 tablet by mouth once daily., Disp: , Rfl:     nystatin (MYCOSTATIN) cream, Apply topically 2 (two) times daily., Disp: 30 g, Rfl: 1    " "ondansetron (ZOFRAN) 4 MG tablet, Take 1 tablet (4 mg total) by mouth every 6 (six) hours. (Patient taking differently: Take 4 mg by mouth as needed. ), Disp: 12 tablet, Rfl: 0    ONETOUCH VERIO Strp, TEST BLOOD SUGAR 2 TIMES DAILY, Disp: 100 strip, Rfl: 3    pen needle, diabetic (BD ULTRA-FINE SHORT PEN NEEDLE) 31 gauge x 5/16" Ndle, INJECT 1 DEVICE INTO THE SKIN 3 TIMES DAILY., Disp: 270 each, Rfl: 3    potassium chloride SA (K-DUR,KLOR-CON) 20 MEQ tablet, TAKE 1 TABLET (20 MEQ TOTAL) BY MOUTH ONCE DAILY., Disp: 90 tablet, Rfl: 1    pyridostigmine (MESTINON) 60 mg Tab, TAKE 1 TABLET FOUR TIMES A DAY, Disp: 360 tablet, Rfl: 3    vitamin B comp and C no.3 15-10-50-5-300 mg Cap, 1 po q day, Disp: 90 capsule, Rfl: 3    blood sugar diagnostic (ONETOUCH VERIO) Strp, Check blood sugar four times daily and as needed. Pt uses One Touch Verio DX E11.65., Disp: 200 strip, Rfl: 11    blood-glucose meter kit, Please check blood sugar four times daily and as needed. Re: Diabetes E11.65, Disp: 1 each, Rfl: 0    diazePAM (VALIUM) 2 MG tablet, Take 1 tablet (2 mg total) by mouth every 8 (eight) hours as needed (muscle spasm). Do not take until tomorrow morning.  Take one dose, and if you experience weakness, take mestinon as directed., Disp: 10 tablet, Rfl: 0    ergocalciferol (ERGOCALCIFEROL) 50,000 unit Cap, Take 1 capsule (50,000 Units total) by mouth every 7 days., Disp: 8 capsule, Rfl: 0    SITagliptin (JANUVIA) 100 MG Tab, Take 1 tablet (100 mg total) by mouth once daily., Disp: 30 tablet, Rfl: 11  No current facility-administered medications for this visit.      Review of patient's allergies indicates:   Allergen Reactions    Ramipril      Other reaction(s): caused a severe cough        Objective:   /80 (BP Method: X-Large (Manual))   Pulse 69   Ht 5' 2" (1.575 m)   Wt 95.3 kg (210 lb)   SpO2 97%   BMI 38.41 kg/m²   BP Readings from Last 3 Encounters:   02/03/20 130/80   01/30/20 120/70   01/17/20 (!) " 156/76     Wt Readings from Last 3 Encounters:   02/10/20 0917 96.1 kg (211 lb 13.8 oz)   02/03/20 1421 95.3 kg (210 lb)   01/30/20 0927 93.4 kg (206 lb)          Physical Exam   Constitutional: She is oriented to person, place, and time. She appears well-developed. No distress.   HENT:   Head: Normocephalic and atraumatic.   Nose: Nose normal.   Mouth/Throat: Oropharynx is clear and moist.   +ve acanthosis   Neck: No tracheal deviation present. No thyromegaly present.   Cardiovascular: Normal rate, regular rhythm and intact distal pulses.   Murmur heard.  Pulmonary/Chest: Effort normal and breath sounds normal. She has no wheezes. She has no rales.   Abdominal: Soft. Bowel sounds are normal.   No lipodystrophy, old healed midline scar, non-violaceous abd striae   Musculoskeletal: She exhibits edema.   Lymphadenopathy:     She has no cervical adenopathy.   Neurological: She is alert and oriented to person, place, and time. No cranial nerve deficit.   Skin: Skin is warm.   Psychiatric: She has a normal mood and affect. Thought content normal.   Vitals reviewed.        Lab Results   Component Value Date     02/10/2020    K 3.1 (L) 02/10/2020     02/10/2020    CO2 27 02/10/2020    BUN 11 02/10/2020    CREATININE 0.59 02/10/2020    GLU 76 02/10/2020    HGBA1C 10.7 (H) 02/03/2020    MG 1.8 02/26/2019    AST 37 (H) 02/10/2020    AST 44 (H) 03/05/2016    ALT 20 02/10/2020    ALBUMIN 3.9 02/10/2020    PROT 7.7 02/10/2020    BILITOT 0.4 02/10/2020    WBC 4.49 11/01/2019    HGB 13.1 11/01/2019    HCT 40.8 11/01/2019    MCV 96 11/01/2019    MCH 30.8 11/01/2019     11/01/2019    MPV 11.1 11/01/2019    GRAN 2.4 11/01/2019    GRAN 53.2 11/01/2019    LYMPH 1.6 11/01/2019    LYMPH 36.1 11/01/2019    CHOL 175 11/01/2019    HDL 44 11/01/2019    LDLCALC 116.4 11/01/2019    TRIG 73 11/01/2019       Lab Results   Component Value Date    TSH 0.846 11/01/2019    FREET4 0.81 03/05/2016        Thyroid Labs Latest Ref Rng  & Units 11/1/2019 2/3/2020 2/10/2020   TSH 0.400 - 4.000 uIU/mL 0.846 - -   Free T4 0.78 - 2.19 ng/dL - - -   Sodium 136 - 145 mmol/L 139 136 140   Potassium 3.5 - 5.1 mmol/L 4.1 4.1 3.1(L)   Chloride 95 - 110 mmol/L 103 104 105   Carbon Dioxide 22 - 31 mmol/L 28 19(L) 27   Glucose 70 - 110 mg/dL 121(H) 275(H) 76   Blood Urea Nitrogen 7 - 18 mg/dL 15 11 11   Creatinine 0.50 - 1.40 mg/dL 0.64 0.9 0.59   Calcium 8.4 - 10.2 mg/dL 9.8 9.6 9.5   Total Protein 6.0 - 8.4 g/dL 8.4 8.5(H) 7.7   Albumin 3.5 - 5.2 g/dL 3.9 3.6 3.9   Total Bilirubin 0.2 - 1.3 mg/dL 0.6 0.2 0.4   AST 14 - 36 U/L 54(H) 51(H) 37(H)   ALT 0 - 35 U/L 27 27 20   Anion Gap 8 - 16 mmol/L 8 13 8   eGFR (African American) >60 mL/min/1.73 m:2 >60 >60.0 >60   eGFR (Non-African American) >60 mL/min/1.73 m:2 >60 >60.0 >60   WBC 3.90 - 12.70 K/uL 4.49 - -   RBC 4.00 - 5.40 M/uL 4.26 - -   Hemoglobin 12.0 - 16.0 g/dL 13.1 - -   Hematocrit 37.0 - 48.5 % 40.8 - -   MCV 82 - 98 fL 96 - -   MCH 27.0 - 31.0 pg 30.8 - -   MCHC 32.0 - 36.0 g/dL 32.1 - -   RDW 11.5 - 14.5 % 13.6 - -   Platelets 150 - 350 K/uL 188 - -   MPV 9.2 - 12.9 fL 11.1 - -   Gran # 1.8 - 7.7 K/uL 2.4 - -   Lymph # 1.0 - 4.8 K/uL 1.6 - -   Mono # 0.3 - 1.0 K/uL 0.3 - -   Eos # 0.0 - 0.5 K/uL 0.1 - -   Baso # 0.00 - 0.20 K/uL 0.04 - -   Gran % 38.0 - 73.0 % 53.2 - -   Lymph % 18.0 - 48.0 % 36.1 - -   Mono% 4.0 - 15.0 % 7.6 - -   Eos % 0.0 - 8.0 % 2.0 - -   Baso % 0.0 - 1.9 % 0.9 - -   INR - - - -           Hemoglobin A1C   Date Value Ref Range Status   02/03/2020 10.7 (H) 4.0 - 5.6 % Final     Comment:     ADA Screening Guidelines:  5.7-6.4%  Consistent with prediabetes  >or=6.5%  Consistent with diabetes  High levels of fetal hemoglobin interfere with the HbA1C  assay. Heterozygous hemoglobin variants (HbS, HgC, etc)do  not significantly interfere with this assay.   However, presence of multiple variants may affect accuracy.     11/01/2019 10.2 (H) 0.0 - 5.6 % Final     Comment:     Reference  Interval:  5.0 - 5.6 Normal   5.7 - 6.4 High Risk   > 6.5 Diabetic    Hgb A1c results are standardized based on the (NGSP) National   Glycohemoglobin Standardization Program.    Hemoglobin A1C levels are related to mean serum/plasma glucose   during the preceding 2-3 months.        07/18/2019 9.5 (H) 0.0 - 5.6 % Final     Comment:     Reference Interval:  5.0 - 5.6 Normal   5.7 - 6.4 High Risk   > 6.5 Diabetic    Hgb A1c results are standardized based on the (NGSP) National   Glycohemoglobin Standardization Program.    Hemoglobin A1C levels are related to mean serum/plasma glucose   during the preceding 2-3 months.              Assessment and plan:       Problem List Items Addressed This Visit        Cardiac/Vascular    Benign essential HTN    Current Assessment & Plan     BP at goal. Continue current meds.         Hyperlipidemia    Current Assessment & Plan     Continue statin.             Renal/    Hypercalcemia  (Chronic)    Current Assessment & Plan     Intermittent. Most recent wnls. Suspect related to HCTZ. Monitor and check PTH.         Relevant Orders    Vitamin D (Completed)       Endocrine    Vitamin D deficiency (Chronic)    Current Assessment & Plan     Previously low. Recheck levels.          Hypothyroidism    Current Assessment & Plan     Clinically euthyroid. TSH 0.846 wnls Nov 2019.         Diabetes mellitus, type 2 - Primary  Pt with macrovascular complication of CVA.  BG not at goal, and pt has not been checking recently due to giving away her meter.  HbA1C 10.2 Nov 2019.  No hx of pancreatitis or MEN syndrome. Start Januvia. Continue current doses of basal bolus insulin. Increase metformin from once to BID as tolerated. egfr >60. Pt with preception of hypoglycemia. Will likely need to adjust insulin dosages as well.   Check BG before meals and bedtime. Bring log to next visit.   Call MD in 2 weeks with blood glucose log.  Last eye exam <1 yr ago and no DPR per pt.  Last urine micro wnls June  2019.  Discussed proper foot care.  Counseled pt on low carb/low fat diet and to increase physical activity.  Last fasting lipid panel Nov 2019.  On statin  Pt with h/o low B12 and on Metformin. Recheck levels.      Relevant Medications    SITagliptin (JANUVIA) 100 MG Tab    Other Relevant Orders    Vitamin D (Completed)    Hemoglobin A1c (Completed)    Vitamin B12 (Completed)    Comprehensive metabolic panel (Completed)    PTH, intact (Completed)      Other Visit Diagnoses     Idiopathic progressive neuropathy         Relevant Orders    Vitamin B12 (Completed)           Follow up:  Call MD in 1 week with BG log  RTC in 1  month

## 2020-02-04 PROBLEM — E83.52 HYPERCALCEMIA: Status: ACTIVE | Noted: 2020-02-04

## 2020-02-04 PROBLEM — E83.52 HYPERCALCEMIA: Chronic | Status: ACTIVE | Noted: 2020-02-04

## 2020-02-04 LAB
25(OH)D3+25(OH)D2 SERPL-MCNC: 28 NG/ML (ref 30–96)
ALBUMIN SERPL BCP-MCNC: 3.6 G/DL (ref 3.5–5.2)
ALP SERPL-CCNC: 80 U/L (ref 55–135)
ALT SERPL W/O P-5'-P-CCNC: 27 U/L (ref 10–44)
ANION GAP SERPL CALC-SCNC: 13 MMOL/L (ref 8–16)
AST SERPL-CCNC: 51 U/L (ref 10–40)
BILIRUB SERPL-MCNC: 0.2 MG/DL (ref 0.1–1)
BUN SERPL-MCNC: 11 MG/DL (ref 8–23)
CALCIUM SERPL-MCNC: 9.6 MG/DL (ref 8.7–10.5)
CHLORIDE SERPL-SCNC: 104 MMOL/L (ref 95–110)
CO2 SERPL-SCNC: 19 MMOL/L (ref 23–29)
CREAT SERPL-MCNC: 0.9 MG/DL (ref 0.5–1.4)
EST. GFR  (AFRICAN AMERICAN): >60 ML/MIN/1.73 M^2
EST. GFR  (NON AFRICAN AMERICAN): >60 ML/MIN/1.73 M^2
ESTIMATED AVG GLUCOSE: 260 MG/DL (ref 68–131)
GLUCOSE SERPL-MCNC: 275 MG/DL (ref 70–110)
HBA1C MFR BLD HPLC: 10.7 % (ref 4–5.6)
POTASSIUM SERPL-SCNC: 4.1 MMOL/L (ref 3.5–5.1)
PROT SERPL-MCNC: 8.5 G/DL (ref 6–8.4)
PTH-INTACT SERPL-MCNC: 184 PG/ML (ref 9–77)
SODIUM SERPL-SCNC: 136 MMOL/L (ref 136–145)
VIT B12 SERPL-MCNC: 872 PG/ML (ref 210–950)

## 2020-02-04 RX ORDER — INSULIN PUMP SYRINGE, 3 ML
EACH MISCELLANEOUS
Qty: 1 EACH | Refills: 0 | Status: SHIPPED | OUTPATIENT
Start: 2020-02-04 | End: 2020-06-16 | Stop reason: SDUPTHER

## 2020-02-04 RX ORDER — ERGOCALCIFEROL 1.25 MG/1
50000 CAPSULE ORAL
Qty: 8 CAPSULE | Refills: 0 | Status: SHIPPED | OUTPATIENT
Start: 2020-02-04 | End: 2020-03-30

## 2020-02-04 NOTE — TELEPHONE ENCOUNTER
Please inform pt:  PTH elevated  B12 nl  Liver enzymes abnl but stable. F/u with PCP  Total protein abnl. F/u with PCP  Bicarb low. Go to ED if severe nausea/vomiting/or abd pain  HbA1C 10.7 previously 10.2. Slightly worse.   Vitamin D still below goal. Start ergocalciferol 50,000 units for 8 weeks.  Need to check 24 hour urine due to elevated PTH.  And inform pt to be sure and get dexa as previously scheduled. Need to add peripheral skeleton (forearm). Still needs to do dexa of spine/hip. please link this to her other dexa appointment

## 2020-02-04 NOTE — ASSESSMENT & PLAN NOTE
Pt with macrovascular complication of CVA.  BG not at goal, and pt has not been checking recently due to giving away her meter.  HbA1C 10.2 Nov 2019.  No hx of pancreatitis or MEN syndrome. Start Januvia. Continue current doses of basal bolus insulin. Increase metformin from once to BID as tolerated. egfr >60. Pt with preception of hypoglycemia. Will likely need to adjust insulin dosages as well.   Check BG before meals and bedtime. Bring log to next visit.   Call MD in 2 weeks with blood glucose log.  Last eye exam <1 yr ago and no DPR per pt.  Last urine micro wnls June 2019.  Discussed proper foot care.  Counseled pt on low carb/low fat diet and to increase physical activity.  Last fasting lipid panel Nov 2019.  On statin  Pt with h/o low B12 and on Metformin. Recheck levels.

## 2020-02-06 NOTE — TELEPHONE ENCOUNTER
Pt advised of Dr. Sandifer's message below and verbalized understanding.  DXA order linked to appt in March.  She will  the 24 hr urine supplies from [facility], collection instructions discussed.

## 2020-02-10 ENCOUNTER — LAB VISIT (OUTPATIENT)
Dept: LAB | Facility: HOSPITAL | Age: 65
End: 2020-02-10
Attending: INTERNAL MEDICINE
Payer: MEDICARE

## 2020-02-10 ENCOUNTER — TELEPHONE (OUTPATIENT)
Dept: NEUROLOGY | Facility: CLINIC | Age: 65
End: 2020-02-10

## 2020-02-10 ENCOUNTER — TELEPHONE (OUTPATIENT)
Dept: ENDOCRINOLOGY | Facility: CLINIC | Age: 65
End: 2020-02-10

## 2020-02-10 DIAGNOSIS — E11.65 TYPE 2 DIABETES MELLITUS WITH HYPERGLYCEMIA, WITH LONG-TERM CURRENT USE OF INSULIN: ICD-10-CM

## 2020-02-10 DIAGNOSIS — E11.65 TYPE 2 DIABETES MELLITUS WITH HYPERGLYCEMIA, WITH LONG-TERM CURRENT USE OF INSULIN: Primary | ICD-10-CM

## 2020-02-10 DIAGNOSIS — M79.662 PAIN AND SWELLING OF LEFT LOWER LEG: ICD-10-CM

## 2020-02-10 DIAGNOSIS — Z79.4 TYPE 2 DIABETES MELLITUS WITH HYPERGLYCEMIA, WITH LONG-TERM CURRENT USE OF INSULIN: Primary | ICD-10-CM

## 2020-02-10 DIAGNOSIS — Z79.4 TYPE 2 DIABETES MELLITUS WITH HYPERGLYCEMIA, WITH LONG-TERM CURRENT USE OF INSULIN: ICD-10-CM

## 2020-02-10 DIAGNOSIS — E21.3 HYPERPARATHYROIDISM: ICD-10-CM

## 2020-02-10 DIAGNOSIS — M79.89 PAIN AND SWELLING OF LEFT LOWER LEG: ICD-10-CM

## 2020-02-10 DIAGNOSIS — M79.89 LEFT LEG SWELLING: Primary | ICD-10-CM

## 2020-02-10 LAB
CALCIUM 24H UR-MRATE: 5 MG/HR (ref 4–12)
CALCIUM UR-MCNC: 5.9 MG/DL (ref 0–15)
CALCIUM URINE (MG/SPEC): 117 MG/SPEC
CREAT 24H UR-MRATE: 32.9 MG/HR (ref 40–75)
CREAT UR-MCNC: 40 MG/DL (ref 15–325)
CREATININE, URINE (MG/SPEC): 790 MG/SPEC
URINE COLLECTION DURATION: 24 HR
URINE COLLECTION DURATION: 24 HR
URINE VOLUME: 1975 ML
URINE VOLUME: 1975 ML

## 2020-02-10 PROCEDURE — 82570 ASSAY OF URINE CREATININE: CPT

## 2020-02-10 PROCEDURE — 82340 ASSAY OF CALCIUM IN URINE: CPT

## 2020-02-10 NOTE — TELEPHONE ENCOUNTER
Called pt to discuss labs and blood sugar logs but no answer. Could not leave voice mail.    PTH markedly elevated- need her to do 24 hour urine before next visit. Also, make sure she does dexa prior to next visit.  B12 nl  Kidney function nl  Liver function stable but elevated  HbA1C 10.7 previously 10.2. Call me with blood sugars in 2 weeks.  Vitamin D 28- keep current dose of vitamin D.

## 2020-02-10 NOTE — TELEPHONE ENCOUNTER
----- Message from Dimitri Ireland Jr., MD sent at 2/10/2020  2:31 PM CST -----  No evidence of DVT.

## 2020-02-10 NOTE — TELEPHONE ENCOUNTER
Spoke with pt and informed of US results per Dr. Ireland: No evidence of DVT.  Pt verbalized understanding.

## 2020-02-11 DIAGNOSIS — E11.65 TYPE 2 DIABETES MELLITUS WITH HYPERGLYCEMIA, WITH LONG-TERM CURRENT USE OF INSULIN: ICD-10-CM

## 2020-02-11 DIAGNOSIS — Z79.4 TYPE 2 DIABETES MELLITUS WITH HYPERGLYCEMIA, WITH LONG-TERM CURRENT USE OF INSULIN: ICD-10-CM

## 2020-02-11 NOTE — TELEPHONE ENCOUNTER
----- Message from Rene Peña sent at 2/11/2020 11:25 AM CST -----  Contact: Yanet with [facility]  Type:  Pharmacy Calling to Clarify an RX    Name of Caller:  Yanet   Pharmacy Name:    [id number] [address]  [address] [address]  Phone: [phone] Fax: [phone]    Prescription Name:  Test strips  What do they need to clarify?:  Need new Rx and to state pt testing 3 x day and to include Dx code  Best Call Back Number:  [phone]   Additional Information:

## 2020-02-11 NOTE — TELEPHONE ENCOUNTER
Discussed with nursing, script for strips being returned due to Medicare rule and [facility] pharmacy. Pt is on long and short acting insulin and should check blood sugar four times daily as stated previously. Nurse will see if pt can  strips from [facility] instead.    24 hr Ca/cr ratio 0.009. Monitor elevated pth. F/u dexa scans.

## 2020-02-12 NOTE — TELEPHONE ENCOUNTER
zak renal function is ok (revewed by Dr. Ireland on 2/10/20. Potassium is a little low. She is prescribed potassium 20 mEq daily by cardiology. would increase to BID x 5 days then resume daily.

## 2020-03-06 PROBLEM — I51.89 DIASTOLIC DYSFUNCTION: Status: ACTIVE | Noted: 2020-03-06

## 2020-03-13 ENCOUNTER — TELEPHONE (OUTPATIENT)
Dept: NEUROLOGY | Facility: CLINIC | Age: 65
End: 2020-03-13

## 2020-03-13 NOTE — TELEPHONE ENCOUNTER
Called and spoke with Marques. Informed her that she will need to call back on Monday for orders. Message for orders was left for Dr. Ireland's staff as well. Marques verbalized understanding.

## 2020-03-13 NOTE — TELEPHONE ENCOUNTER
----- Message from Chandra Mullins sent at 3/13/2020  9:19 AM CDT -----  Contact: [facility]   Type:  Patient Returning Call    Who Called:  [facility]  Who Left Message for Patient:    Does the patient know what this is regarding?:  New Orders for [employer]  Best Call Back Number:    Additional Information:  Please send over or contact as soon as possible. Thank you     no (get order)

## 2020-03-16 ENCOUNTER — OFFICE VISIT (OUTPATIENT)
Dept: ENDOCRINOLOGY | Facility: CLINIC | Age: 65
End: 2020-03-16
Payer: MEDICARE

## 2020-03-16 ENCOUNTER — TELEPHONE (OUTPATIENT)
Dept: NEUROLOGY | Facility: CLINIC | Age: 65
End: 2020-03-16

## 2020-03-16 VITALS
HEART RATE: 69 BPM | HEIGHT: 63 IN | DIASTOLIC BLOOD PRESSURE: 64 MMHG | WEIGHT: 213.38 LBS | BODY MASS INDEX: 37.81 KG/M2 | SYSTOLIC BLOOD PRESSURE: 114 MMHG | OXYGEN SATURATION: 98 %

## 2020-03-16 DIAGNOSIS — I10 ESSENTIAL HYPERTENSION: ICD-10-CM

## 2020-03-16 DIAGNOSIS — E03.4 HYPOTHYROIDISM DUE TO ACQUIRED ATROPHY OF THYROID: ICD-10-CM

## 2020-03-16 DIAGNOSIS — E11.59 TYPE 2 DIABETES MELLITUS WITH OTHER CIRCULATORY COMPLICATION, WITH LONG-TERM CURRENT USE OF INSULIN: Primary | ICD-10-CM

## 2020-03-16 DIAGNOSIS — E55.9 VITAMIN D DEFICIENCY: Chronic | ICD-10-CM

## 2020-03-16 DIAGNOSIS — Z79.4 TYPE 2 DIABETES MELLITUS WITH OTHER CIRCULATORY COMPLICATION, WITH LONG-TERM CURRENT USE OF INSULIN: Primary | ICD-10-CM

## 2020-03-16 DIAGNOSIS — E21.3 HYPERPARATHYROIDISM: ICD-10-CM

## 2020-03-16 DIAGNOSIS — E78.2 MIXED HYPERLIPIDEMIA: ICD-10-CM

## 2020-03-16 DIAGNOSIS — M85.859 OSTEOPENIA OF NECK OF FEMUR, UNSPECIFIED LATERALITY: ICD-10-CM

## 2020-03-16 PROCEDURE — 99999 PR PBB SHADOW E&M-EST. PATIENT-LVL III: CPT | Mod: PBBFAC,,, | Performed by: INTERNAL MEDICINE

## 2020-03-16 PROCEDURE — 99214 OFFICE O/P EST MOD 30 MIN: CPT | Mod: S$PBB,,, | Performed by: INTERNAL MEDICINE

## 2020-03-16 PROCEDURE — 99999 PR PBB SHADOW E&M-EST. PATIENT-LVL III: ICD-10-PCS | Mod: PBBFAC,,, | Performed by: INTERNAL MEDICINE

## 2020-03-16 PROCEDURE — 99213 OFFICE O/P EST LOW 20 MIN: CPT | Mod: PBBFAC,PO | Performed by: INTERNAL MEDICINE

## 2020-03-16 PROCEDURE — 99214 PR OFFICE/OUTPT VISIT, EST, LEVL IV, 30-39 MIN: ICD-10-PCS | Mod: S$PBB,,, | Performed by: INTERNAL MEDICINE

## 2020-03-16 RX ORDER — METFORMIN HYDROCHLORIDE 500 MG/1
TABLET, EXTENDED RELEASE ORAL
Qty: 60 TABLET | Refills: 11 | Status: SHIPPED | OUTPATIENT
Start: 2020-03-16 | End: 2020-05-11

## 2020-03-16 NOTE — ASSESSMENT & PLAN NOTE
Pt with osteopenia. ?if exacerbated by elevated parathyroid hormone. Discussed fall precautions. On vit D replacement. Needs calcium 1000 mg daily (preferrably from food sources). No indication for further intervention at this point.

## 2020-03-16 NOTE — PROGRESS NOTES
Subjective:    Patient ID:  Annie Mason is a 64 y.o. female.    Chief Complaint:  Diabetes      Pt presents to follow up for type 2 diabetes and HPT.     Overview: Onset of Type 2 diabetes mellitus was < 10 years ago. Has history of CVA but no other macrovascular complications. No microvascular complications. No history of DKA.   Cardiovascular risk factors: advanced age (older than 55 for men, 65 for women), diabetes mellitus, dyslipidemia, hypertension and sedentary lifestyle. In the past, she was not checking blood sugar due to giving her meter to her son. Last eye exam was about 1 year ago.  No hx of DR per pt. No history of pancreatitis or MEN syndrome.       History of hypothyroidism and myasthenia gravis. Takes levothyroxine. No issues swallowing or voice changes.     Since her last visit, she went to PCP for leg swelling. Was dx'ed with cellulitis. Has been on abx. Notes LE swelling is improved. Has been taking lasix for about one year.     Currently taking:  Novolog 7 units w/ meals  Basaglar 56 units at night  Metformin 750 mg BID. Has some loose stool.     Pt did not bring log or meter. Recalls her blood sugars were:  FBG . Had 74 once  Pre-lunch- 141 and 173. Does not eat lunch regularly.  Pre-dinner- 70- 223  Bedtime- does not check     Notes she was symptomatic when BG was in 70's. Says she probably didn't eat enough. Only ate a piece of chicken and took the same dose of Novolog.      In regards to HPT, denies history of fractures. Had kidney stone once several years ago. Is taking ergocalciferol once weekly. No recent falls. Has been taking lasix for about one year.            Diabetes Management Status    Statin: Taking  ACE/ARB: Not taking    Screening or Prevention Patient's value Goal Complete/Controlled?   HgA1C Testing and Control   Lab Results   Component Value Date    HGBA1C 10.7 (H) 02/03/2020      Annually/Less than 8% No   Lipid profile : 11/01/2019 Annually Yes   LDL control  "Lab Results   Component Value Date    LDLCALC 116.4 11/01/2019    Annually/Less than 100 mg/dl  No   Nephropathy screening Lab Results   Component Value Date    LABMICR <6.0 07/18/2019     Lab Results   Component Value Date    PROTEINUA Negative 07/19/2019    Annually Yes   Blood pressure BP Readings from Last 1 Encounters:   03/16/20 134/82    Less than 140/90 Yes   Dilated retinal exam : 01/15/2018 Annually Yes   Foot exam   : 01/30/2020 Annually Yes       Review of Systems   Constitutional: Negative for unexpected weight change.   Eyes: Positive for visual disturbance.   Respiratory: Negative for shortness of breath.    Cardiovascular: Positive for leg swelling. Negative for chest pain.   Gastrointestinal: Positive for abdominal distention. Negative for abdominal pain.   Endocrine: Positive for polyphagia. Negative for polydipsia and polyuria.   Genitourinary: Positive for urgency.   Musculoskeletal: Negative for myalgias.   Skin: Negative for wound.   Neurological: Negative for headaches.   Hematological: Does not bruise/bleed easily.   Psychiatric/Behavioral: Negative for sleep disturbance.        Past Medical History:   Diagnosis Date    Allergy     Anticoagulant long-term use     Brachial plexitis     Depression     Diabetes mellitus, type 2     GERD (gastroesophageal reflux disease)     Hyperlipidemia 2/4/2015    Hypertension     Hypothyroidism     MG (myasthenia gravis)     Mild intermittent asthma with acute exacerbation 12/26/2017    Myasthenia gravis without exacerbation 3/25/2015    Seizures     "years ago"    Stroke     2008      Social History     Tobacco Use    Smoking status: Never Smoker    Smokeless tobacco: Never Used   Substance Use Topics    Alcohol use: No    Drug use: No     Family History   Problem Relation Age of Onset    Diabetes Mother     Hypertension Mother     Hypertension Father     Stroke Father     Ovarian cancer Maternal Aunt     Breast cancer Maternal Aunt  " "   Myasthenia gravis Paternal Aunt     Diabetes type I Son     Breast cancer Sister 64      Past Surgical History:   Procedure Laterality Date    CHOLECYSTECTOMY      CORONARY ANGIOGRAPHY N/A 1/28/2019    Procedure: ANGIOGRAM, CORONARY ARTERY;  Surgeon: Ike Jasso III, MD;  Location: [facility] CATH;  Service: Cardiology;  Laterality: N/A;    LEFT HEART CATHETERIZATION Left 1/28/2019    Procedure: Left heart cath;  Surgeon: Ike Jasso III, MD;  Location: [facility] CATH;  Service: Cardiology;  Laterality: Left;    PORTACATH PLACEMENT  12/14/2016    thalmusectomy      TUBAL LIGATION            Current Outpatient Medications:     albuterol (PROVENTIL) 2.5 mg /3 mL (0.083 %) nebulizer solution, Take 2.5 mg by nebulization every 6 (six) hours as needed for Wheezing. Rescue, Disp: , Rfl:     albuterol sulfate (PROAIR RESPICLICK) 90 mcg/actuation AePB, Inhale 180 mcg into the lungs every 4 (four) hours. Rescue, Disp: , Rfl:     aspirin (ECOTRIN) 81 MG EC tablet, Take 81 mg by mouth once daily., Disp: , Rfl:     atorvastatin (LIPITOR) 40 MG tablet, Take 1 tablet (40 mg total) by mouth once daily., Disp: 90 tablet, Rfl: 3    BD VEO INSULIN SYRINGE UF 0.3 mL 31 gauge x 15/64" Syrg, USE TO INJECT NOVOLOG 3 TIMES DAILY, Disp: , Rfl: 3    blood pressure monitor Kit, Use to check blood pressure daily, Disp: , Rfl: 0    blood sugar diagnostic (ONETOUCH VERIO) Strp, Check blood sugar 4 times daily.  DX E11.65., Disp: 200 strip, Rfl: 11    blood-glucose meter kit, Please check blood sugar four times daily and as needed. Re: Diabetes E11.65, Disp: 1 each, Rfl: 0    carvedilol (COREG) 25 MG tablet, TAKE 1 TABLET BY MOUTH TWICE A DAY WITH FOOD, Disp: 180 tablet, Rfl: 3    compress.stocking,knee,reg,med Misc, 1 Pair by Misc.(Non-Drug; Combo Route) route once daily. 15-20 mmHg, Disp: 2 each, Rfl: 1    diclofenac sodium (VOLTAREN) 1 % Gel, Apply 2 g topically 3 (three) times daily. To neck for pain, Disp: 100 g, Rfl: 0   " " doxycycline (VIBRAMYCIN) 100 MG Cap, Take 1 capsule (100 mg total) by mouth 2 (two) times daily. for 10 days, Disp: 20 capsule, Rfl: 0    DULoxetine (CYMBALTA) 60 MG capsule, Take 1 capsule (60 mg total) by mouth once daily. (Patient taking differently: Take 20 mg by mouth once daily. ), Disp: 90 capsule, Rfl: 3    ergocalciferol (ERGOCALCIFEROL) 50,000 unit Cap, Take 1 capsule (50,000 Units total) by mouth every 7 days., Disp: 8 capsule, Rfl: 0    fluocinonide 0.05% (LIDEX) 0.05 % cream, Apply topically 2 (two) times daily., Disp: 1 Tube, Rfl: 3    fluticasone (FLONASE) 50 mcg/actuation nasal spray, 2 sprays each  nostril bid x 1 week then q day prn  nasal congestion or sneezing (Patient taking differently: as needed. 2 sprays each  nostril bid x 1 week then q day prn  nasal congestion or sneezing), Disp: 1 Bottle, Rfl: 3    fluticasone-vilanterol (BREO ELLIPTA) 200-25 mcg/dose DsDv diskus inhaler, Inhale 1 puff into the lungs once daily. Controller, Disp: , Rfl:     furosemide (LASIX) 40 MG tablet, TAKE 1 TABLET BY MOUTH EVERY DAY, Disp: 90 tablet, Rfl: 1    hydrocodone-acetaminophen 5-325mg (NORCO) 5-325 mg per tablet, Take 1 tablet by mouth every 6 (six) hours as needed for Pain., Disp: 30 tablet, Rfl: 0    insulin (BASAGLAR KWIKPEN U-100 INSULIN) glargine 100 units/mL (3mL) SubQ pen, Inject 56 Units into the skin every evening., Disp: 3 Box, Rfl: 3    insulin aspart U-100 (NOVOLOG U-100 INSULIN ASPART) 100 unit/mL injection, Inject 7 Units into the skin 3 (three) times daily before meals., Disp: 20 mL, Rfl: 0    insulin syringe-needle U-100 (BD VEO INSULIN SYRINGE UF) 1 mL 31 gauge x 15/64" Syrg, Use to inject Novolog 3 times daily, Disp: 300 Syringe, Rfl: 3    lancets (ONETOUCH DELICA LANCETS) 33 gauge Misc, 1 lancet by Misc.(Non-Drug; Combo Route) route once daily., Disp: 100 each, Rfl: 3    levothyroxine (SYNTHROID) 50 MCG tablet, TAKE 1 TABLET BY MOUTH EVERY DAY, Disp: 30 tablet, Rfl: 11    " "loratadine (CLARITIN) 10 mg tablet, TAKE 1 TABLET ONCE A DAY AS NEEDED FOR ALLERGIES, Disp: 90 tablet, Rfl: 2    magnesium 30 mg Tab, Take by mouth once., Disp: , Rfl:     meloxicam (MOBIC) 7.5 MG tablet, Take 7.5 mg by mouth once daily. FOR 7 DAYS, Disp: , Rfl: 0    multivitamin (ONE DAILY MULTIVITAMIN) per tablet, Take 1 tablet by mouth once daily., Disp: , Rfl:     mupirocin (BACTROBAN) 2 % ointment, Apply topically 2 (two) times daily. To affected areas on legs, Disp: 30 g, Rfl: 1    nystatin (MYCOSTATIN) cream, Apply topically 2 (two) times daily., Disp: 30 g, Rfl: 1    ondansetron (ZOFRAN) 4 MG tablet, Take 1 tablet (4 mg total) by mouth every 6 (six) hours. (Patient taking differently: Take 4 mg by mouth as needed. ), Disp: 12 tablet, Rfl: 0    pen needle, diabetic (BD ULTRA-FINE SHORT PEN NEEDLE) 31 gauge x 5/16" Ndle, INJECT 1 DEVICE INTO THE SKIN 3 TIMES DAILY., Disp: 270 each, Rfl: 3    potassium chloride SA (K-DUR,KLOR-CON) 20 MEQ tablet, Take 1 tablet (20 mEq total) by mouth once daily., Disp: 90 tablet, Rfl: 1    pyridostigmine (MESTINON) 60 mg Tab, TAKE 1 TABLET FOUR TIMES A DAY, Disp: 360 tablet, Rfl: 3    SITagliptin (JANUVIA) 100 MG Tab, Take 1 tablet (100 mg total) by mouth once daily., Disp: 30 tablet, Rfl: 11    vitamin B comp and C no.3 15-10-50-5-300 mg Cap, 1 po q day, Disp: 90 capsule, Rfl: 3    diazePAM (VALIUM) 2 MG tablet, Take 1 tablet (2 mg total) by mouth every 8 (eight) hours as needed (muscle spasm). Do not take until tomorrow morning.  Take one dose, and if you experience weakness, take mestinon as directed., Disp: 10 tablet, Rfl: 0    metFORMIN (GLUCOPHAGE-XR) 500 MG XR 24hr tablet, Take two tablets by mouth twice daily., Disp: 60 tablet, Rfl: 11  No current facility-administered medications for this visit.      Review of patient's allergies indicates:   Allergen Reactions    Ramipril      Other reaction(s): caused a severe cough        Objective:   /64   Pulse " "69   Ht 5' 3" (1.6 m)   Wt 96.8 kg (213 lb 6.5 oz)   SpO2 98%   BMI 37.80 kg/m²   BP Readings from Last 3 Encounters:   03/16/20 134/82   03/16/20 114/64   03/13/20 (!) 160/82     Wt Readings from Last 3 Encounters:   03/16/20 1139 96.8 kg (213 lb 6.5 oz)   03/16/20 0929 96.8 kg (213 lb 6.5 oz)   03/13/20 1336 96.2 kg (212 lb)          Physical Exam   Constitutional: She is oriented to person, place, and time. She appears well-developed. No distress.   HENT:   Head: Normocephalic and atraumatic.   Nose: Nose normal.   Mouth/Throat: Oropharynx is clear and moist.   +ve acanthosis   Neck: No JVD present.   Cardiovascular: Normal rate, regular rhythm and intact distal pulses.   Murmur heard.  Pulmonary/Chest: Effort normal and breath sounds normal. She has no wheezes. She has no rales.   Abdominal: Soft. Bowel sounds are normal.   No lipodystrophy, old healed midline scar, non-violaceous abd striae   Musculoskeletal: She exhibits edema.   Neurological: She is alert and oriented to person, place, and time. No cranial nerve deficit.   Skin: Skin is warm.   Psychiatric: She has a normal mood and affect. Thought content normal.   Vitals reviewed.        Lab Results   Component Value Date     03/13/2020    K 3.8 03/13/2020     03/13/2020    CO2 28 03/13/2020    BUN 12 03/13/2020    CREATININE 0.60 03/13/2020     (H) 03/13/2020    HGBA1C 10.7 (H) 02/03/2020    MG 1.8 02/26/2019    AST 37 (H) 02/10/2020    AST 44 (H) 03/05/2016    ALT 20 02/10/2020    ALBUMIN 3.9 02/10/2020    PROT 7.7 02/10/2020    BILITOT 0.4 02/10/2020    WBC 4.49 11/01/2019    HGB 13.1 11/01/2019    HCT 40.8 11/01/2019    MCV 96 11/01/2019    MCH 30.8 11/01/2019     11/01/2019    MPV 11.1 11/01/2019    GRAN 2.4 11/01/2019    GRAN 53.2 11/01/2019    LYMPH 1.6 11/01/2019    LYMPH 36.1 11/01/2019    CHOL 175 11/01/2019    HDL 44 11/01/2019    LDLCALC 116.4 11/01/2019    TRIG 73 11/01/2019       Lab Results   Component Value Date "    TSH 0.846 11/01/2019    FREET4 0.81 03/05/2016        Thyroid Labs Latest Ref Rng & Units 2/3/2020 2/10/2020 3/13/2020   TSH 0.400 - 4.000 uIU/mL - - -   Free T4 0.78 - 2.19 ng/dL - - -   Sodium 136 - 145 mmol/L 136 140 138   Potassium 3.5 - 5.1 mmol/L 4.1 3.1(L) 3.8   Chloride 95 - 110 mmol/L 104 105 104   Carbon Dioxide 22 - 31 mmol/L 19(L) 27 28   Glucose 70 - 110 mg/dL 275(H) 76 126(H)   Blood Urea Nitrogen 7 - 18 mg/dL 11 11 12   Creatinine 0.50 - 1.40 mg/dL 0.9 0.59 0.60   Calcium 8.4 - 10.2 mg/dL 9.6 9.5 9.3   Total Protein 6.0 - 8.4 g/dL 8.5(H) 7.7 -   Albumin 3.5 - 5.2 g/dL 3.6 3.9 -   Total Bilirubin 0.2 - 1.3 mg/dL 0.2 0.4 -   AST 14 - 36 U/L 51(H) 37(H) -   ALT 0 - 35 U/L 27 20 -   Anion Gap 8 - 16 mmol/L 13 8 6(L)   eGFR (African American) >60 mL/min/1.73 m:2 >60.0 >60 >60   eGFR (Non-African American) >60 mL/min/1.73 m:2 >60.0 >60 >60   WBC 3.90 - 12.70 K/uL - - -   RBC 4.00 - 5.40 M/uL - - -   Hemoglobin 12.0 - 16.0 g/dL - - -   Hematocrit 37.0 - 48.5 % - - -   MCV 82 - 98 fL - - -   MCH 27.0 - 31.0 pg - - -   MCHC 32.0 - 36.0 g/dL - - -   RDW 11.5 - 14.5 % - - -   Platelets 150 - 350 K/uL - - -   MPV 9.2 - 12.9 fL - - -   Gran # 1.8 - 7.7 K/uL - - -   Lymph # 1.0 - 4.8 K/uL - - -   Mono # 0.3 - 1.0 K/uL - - -   Eos # 0.0 - 0.5 K/uL - - -   Baso # 0.00 - 0.20 K/uL - - -   Gran % 38.0 - 73.0 % - - -   Lymph % 18.0 - 48.0 % - - -   Mono% 4.0 - 15.0 % - - -   Eos % 0.0 - 8.0 % - - -   Baso % 0.0 - 1.9 % - - -   INR - - - -           Hemoglobin A1C   Date Value Ref Range Status   02/03/2020 10.7 (H) 4.0 - 5.6 % Final     Comment:     ADA Screening Guidelines:  5.7-6.4%  Consistent with prediabetes  >or=6.5%  Consistent with diabetes  High levels of fetal hemoglobin interfere with the HbA1C  assay. Heterozygous hemoglobin variants (HbS, HgC, etc)do  not significantly interfere with this assay.   However, presence of multiple variants may affect accuracy.     11/01/2019 10.2 (H) 0.0 - 5.6 % Final      Comment:     Reference Interval:  5.0 - 5.6 Normal   5.7 - 6.4 High Risk   > 6.5 Diabetic    Hgb A1c results are standardized based on the (NGSP) National   Glycohemoglobin Standardization Program.    Hemoglobin A1C levels are related to mean serum/plasma glucose   during the preceding 2-3 months.        07/18/2019 9.5 (H) 0.0 - 5.6 % Final     Comment:     Reference Interval:  5.0 - 5.6 Normal   5.7 - 6.4 High Risk   > 6.5 Diabetic    Hgb A1c results are standardized based on the (NGSP) National   Glycohemoglobin Standardization Program.    Hemoglobin A1C levels are related to mean serum/plasma glucose   during the preceding 2-3 months.              Assessment and plan:       Problem List Items Addressed This Visit        Cardiac/Vascular    Essential hypertension    Current Assessment & Plan     BP at goal. Continue current meds.         Hyperlipidemia    Current Assessment & Plan     Continue statin. Last FLP Nov 2019- , TG 73.            Endocrine    Vitamin D deficiency (Chronic)    Current Assessment & Plan     On replacement. 24 hr urine calcium 0.013. Recheck levels prior to next visit.         Relevant Orders    Vitamin D    Type 2 diabetes mellitus with circulatory disorder - Primary    Current Assessment & Plan     Pt with macrovascular complication of CVA.  BG improved but has some hypoglycemia. Pt has not been checking BG before meals and bedtime.  Hypoglycemia likely due to taking Novolog despite low carb content in food and excess basal insulin. Discussed with pt only to take 3-4 units of Novolog if eating less than 30 grams of carbs.   HbA1C 10.2 Nov 2019. Recheck at next visit.   Continue Januvia. No hx of pancreatitis or MEN syndrome.    Continue current dose of Basaglar 56 units daily.  Change metformin to 500 mg XR tabs. Increase as tolerated to 1000 mg BID. Informed pt to take with two largest meals. Has some loose stools. Egfr >60.  Check BG before meals and bedtime. Bring log to next  visit.   Call MD in 2 weeks with blood glucose log.  Last eye exam <1 yr ago and no DPR per pt.  Last urine micro wnls June 2019.  Discussed proper foot care.  Counseled pt on low carb/low fat diet and to increase physical activity.  Last fasting lipid panel Nov 2019.  On statin  BP at goal.                 Relevant Medications    metFORMIN (GLUCOPHAGE-XR) 500 MG XR 24hr tablet    Other Relevant Orders    Hemoglobin A1c    Hypothyroidism    Current Assessment & Plan     Clinically euthyroid. TSH 0.846 wnls Nov 2019.         Hyperparathyroidism    Current Assessment & Plan     Etiology unclear. Suspect PHPT. Most recent calcium wnls. In the past calcium mildly elevated; seemingly, this occurred while on HCTZ. Pt has nl renal function. Check renal US to check for asymptomatic stones. She does have osteopenia. Suspect a component of hyperpara may be due to diuretic use. Will ask PCP to switch pt to spironolactone in place of lasixs to tx HTN. Unlike loop diuretics, RAAS inhibitors typically do not increase PTH levels. Recheck PTH prior to next visit and hopefully pt off lasixs by then.          Relevant Orders    US Kidney    PTH, intact    Phosphorus       Orthopedic    Osteopenia of neck of femur    Current Assessment & Plan     Pt with osteopenia. ?if exacerbated by elevated parathyroid hormone. Discussed fall precautions. On vit D replacement. Needs calcium 1000 mg daily (preferrably from food sources). No indication for further intervention at this point.                  Follow up:     RTC in 3 months with HbA1C, PTH, phos, vitamin D and renal US  Can do renal US now

## 2020-03-16 NOTE — ASSESSMENT & PLAN NOTE
Etiology unclear. Suspect PHPT. Most recent calcium wnls. In the past calcium mildly elevated; seemingly, this occurred while on HCTZ. Pt has nl renal function. Check renal US to check for asymptomatic stones. She does have osteopenia. Suspect a component of hyperpara may be due to diuretic use. Will ask PCP to switch pt to spironolactone in place of lasixs to tx HTN. Unlike loop diuretics, RAAS inhibitors typically do not increase PTH levels. Recheck PTH prior to next visit and hopefully pt off lasixs by then.

## 2020-03-16 NOTE — TELEPHONE ENCOUNTER
----- Message from Rene Peña sent at 3/16/2020 10:21 AM CDT -----  Contact: Debbie with [facility] infusion  Type: Needs Medical Advice    Who Called:  Debbie Camarena Call Back Number: [phone]  Additional Information: requesting orders for infusion. Pt is scheduled to come in at 10:30.

## 2020-03-16 NOTE — PATIENT INSTRUCTIONS
Change Metformin to 500mg tablets. Increase dose to 2 tablets twice daily as tolerated.   Continue current doses of insulin.  Continue Januvia.

## 2020-03-16 NOTE — PROGRESS NOTES
Subjective:    Patient ID:  Annie Mason is a 64 y.o. female.    Chief Complaint:  Diabetes       Pt presents to follow up for type 2 diabetes and HPT.    Overview: Onset of Type 2 diabetes mellitus was < 10 years ago. Has history of CVA but no other macrovascular complications. No microvascular complications. No history of DKA.   Cardiovascular risk factors: advanced age (older than 55 for men, 65 for women), diabetes mellitus, dyslipidemia, hypertension and sedentary lifestyle. In the past, she was not checking blood sugar due to giving her meter to her son. Last eye exam was about 1 year ago.  No hx of DR per pt. No history of pancreatitis or MEN syndrome.       History of hypothyroidism and myasthenia gravis. Takes levothyroxine. No issues swallowing or voice changes.    Since her last visit, she went to PCP for leg swelling. Was dx'ed with cellulitis. Has been on abx. Notes LE swelling is improved. Has been taking lasix for about one year.    Currently taking:  Novolog 7 units w/ meals  Basaglar 56 units at night  Metformin 750 mg BID. Has some loose stool.    Pt did not bring log or meter. Recalls her blood sugars were:  FBG . Had 74 once  Pre-lunch- 141 and 173. Does not eat lunch regularly.  Pre-dinner- 70- 223  Bedtime- does not check    Notes she was symptomatic when BG was in 70's. Says she probably didn't eat enough. Only ate a piece of chicken and took the same dose of Novolog.     In regards to HPT, denies history of fractures. Had kidney stone once several years ago. Is taking ergocalciferol once weekly. No recent falls. Has been taking lasix for about one year.         Diabetes Management Status    Statin: Taking  ACE/ARB: Not taking    Screening or Prevention Patient's value Goal Complete/Controlled?   HgA1C Testing and Control   Lab Results   Component Value Date    HGBA1C 10.7 (H) 02/03/2020      Annually/Less than 8% No   Lipid profile : 11/01/2019 Annually Yes   LDL control Lab  "Results   Component Value Date    LDLCALC 116.4 11/01/2019    Annually/Less than 100 mg/dl  No   Nephropathy screening Lab Results   Component Value Date    LABMICR <6.0 07/18/2019     Lab Results   Component Value Date    PROTEINUA Negative 07/19/2019    Annually Yes   Blood pressure BP Readings from Last 1 Encounters:   03/16/20 134/82    Less than 140/90 Yes   Dilated retinal exam : 01/15/2018 Annually Yes   Foot exam   : 01/30/2020 Annually Yes       Review of Systems   Constitutional: Negative for fatigue, fever and unexpected weight change.   Eyes: Negative for visual disturbance.        Notes her eyes have been runny     Respiratory: Negative for shortness of breath.    Cardiovascular: Positive for leg swelling. Negative for chest pain.   Gastrointestinal: Negative for abdominal pain.   Endocrine: Negative for polydipsia and polyphagia.   Musculoskeletal: Negative for myalgias.   Skin: Negative for wound.   Neurological: Negative for syncope, numbness and headaches.   Hematological: Negative for adenopathy.   Psychiatric/Behavioral: Negative for sleep disturbance.        Past Medical History:   Diagnosis Date    Allergy     Anticoagulant long-term use     Brachial plexitis     Depression     Diabetes mellitus, type 2     GERD (gastroesophageal reflux disease)     Hyperlipidemia 2/4/2015    Hypertension     Hypothyroidism     MG (myasthenia gravis)     Mild intermittent asthma with acute exacerbation 12/26/2017    Myasthenia gravis without exacerbation 3/25/2015    Seizures     "years ago"    Stroke     2008      Social History     Tobacco Use    Smoking status: Never Smoker    Smokeless tobacco: Never Used   Substance Use Topics    Alcohol use: No    Drug use: No     Family History   Problem Relation Age of Onset    Diabetes Mother     Hypertension Mother     Hypertension Father     Stroke Father     Ovarian cancer Maternal Aunt     Breast cancer Maternal Aunt     Myasthenia gravis " "Paternal Aunt     Diabetes type I Son     Breast cancer Sister 64      Past Surgical History:   Procedure Laterality Date    CHOLECYSTECTOMY      CORONARY ANGIOGRAPHY N/A 1/28/2019    Procedure: ANGIOGRAM, CORONARY ARTERY;  Surgeon: Ike Jasso III, MD;  Location: [facility] CATH;  Service: Cardiology;  Laterality: N/A;    LEFT HEART CATHETERIZATION Left 1/28/2019    Procedure: Left heart cath;  Surgeon: Ike Jasso III, MD;  [facility];  Service: Cardiology;  Laterality: Left;    PORTACATH PLACEMENT  12/14/2016    thalmusectomy      TUBAL LIGATION            Current Outpatient Medications:     albuterol (PROVENTIL) 2.5 mg /3 mL (0.083 %) nebulizer solution, Take 2.5 mg by nebulization every 6 (six) hours as needed for Wheezing. Rescue, Disp: , Rfl:     albuterol sulfate (PROAIR RESPICLICK) 90 mcg/actuation AePB, Inhale 180 mcg into the lungs every 4 (four) hours. Rescue, Disp: , Rfl:     aspirin (ECOTRIN) 81 MG EC tablet, Take 81 mg by mouth once daily., Disp: , Rfl:     atorvastatin (LIPITOR) 40 MG tablet, Take 1 tablet (40 mg total) by mouth once daily., Disp: 90 tablet, Rfl: 3    BD VEO INSULIN SYRINGE UF 0.3 mL 31 gauge x 15/64" Syrg, USE TO INJECT NOVOLOG 3 TIMES DAILY, Disp: , Rfl: 3    blood pressure monitor Kit, Use to check blood pressure daily, Disp: , Rfl: 0    blood sugar diagnostic (ONETOUCH VERIO) Strp, Check blood sugar 4 times daily.  DX E11.65., Disp: 200 strip, Rfl: 11    blood-glucose meter kit, Please check blood sugar four times daily and as needed. Re: Diabetes E11.65, Disp: 1 each, Rfl: 0    carvedilol (COREG) 25 MG tablet, TAKE 1 TABLET BY MOUTH TWICE A DAY WITH FOOD, Disp: 180 tablet, Rfl: 3    compress.stocking,knee,reg,med Misc, 1 Pair by Misc.(Non-Drug; Combo Route) route once daily. 15-20 mmHg, Disp: 2 each, Rfl: 1    diclofenac sodium (VOLTAREN) 1 % Gel, Apply 2 g topically 3 (three) times daily. To neck for pain, Disp: 100 g, Rfl: 0    doxycycline " "(VIBRAMYCIN) 100 MG Cap, Take 1 capsule (100 mg total) by mouth 2 (two) times daily. for 10 days, Disp: 20 capsule, Rfl: 0    DULoxetine (CYMBALTA) 60 MG capsule, Take 1 capsule (60 mg total) by mouth once daily. (Patient taking differently: Take 20 mg by mouth once daily. ), Disp: 90 capsule, Rfl: 3    ergocalciferol (ERGOCALCIFEROL) 50,000 unit Cap, Take 1 capsule (50,000 Units total) by mouth every 7 days., Disp: 8 capsule, Rfl: 0    fluocinonide 0.05% (LIDEX) 0.05 % cream, Apply topically 2 (two) times daily., Disp: 1 Tube, Rfl: 3    fluticasone (FLONASE) 50 mcg/actuation nasal spray, 2 sprays each  nostril bid x 1 week then q day prn  nasal congestion or sneezing (Patient taking differently: as needed. 2 sprays each  nostril bid x 1 week then q day prn  nasal congestion or sneezing), Disp: 1 Bottle, Rfl: 3    fluticasone-vilanterol (BREO ELLIPTA) 200-25 mcg/dose DsDv diskus inhaler, Inhale 1 puff into the lungs once daily. Controller, Disp: , Rfl:     furosemide (LASIX) 40 MG tablet, TAKE 1 TABLET BY MOUTH EVERY DAY, Disp: 90 tablet, Rfl: 1    hydrocodone-acetaminophen 5-325mg (NORCO) 5-325 mg per tablet, Take 1 tablet by mouth every 6 (six) hours as needed for Pain., Disp: 30 tablet, Rfl: 0    insulin (BASAGLAR KWIKPEN U-100 INSULIN) glargine 100 units/mL (3mL) SubQ pen, Inject 56 Units into the skin every evening., Disp: 3 Box, Rfl: 3    insulin aspart U-100 (NOVOLOG U-100 INSULIN ASPART) 100 unit/mL injection, Inject 7 Units into the skin 3 (three) times daily before meals., Disp: 20 mL, Rfl: 0    insulin syringe-needle U-100 (BD VEO INSULIN SYRINGE UF) 1 mL 31 gauge x 15/64" Syrg, Use to inject Novolog 3 times daily, Disp: 300 Syringe, Rfl: 3    lancets (ONETOUCH DELICA LANCETS) 33 gauge Misc, 1 lancet by Misc.(Non-Drug; Combo Route) route once daily., Disp: 100 each, Rfl: 3    levothyroxine (SYNTHROID) 50 MCG tablet, TAKE 1 TABLET BY MOUTH EVERY DAY, Disp: 30 tablet, Rfl: 11    loratadine " "(CLARITIN) 10 mg tablet, TAKE 1 TABLET ONCE A DAY AS NEEDED FOR ALLERGIES, Disp: 90 tablet, Rfl: 2    magnesium 30 mg Tab, Take by mouth once., Disp: , Rfl:     meloxicam (MOBIC) 7.5 MG tablet, Take 7.5 mg by mouth once daily. FOR 7 DAYS, Disp: , Rfl: 0    multivitamin (ONE DAILY MULTIVITAMIN) per tablet, Take 1 tablet by mouth once daily., Disp: , Rfl:     mupirocin (BACTROBAN) 2 % ointment, Apply topically 2 (two) times daily. To affected areas on legs, Disp: 30 g, Rfl: 1    nystatin (MYCOSTATIN) cream, Apply topically 2 (two) times daily., Disp: 30 g, Rfl: 1    ondansetron (ZOFRAN) 4 MG tablet, Take 1 tablet (4 mg total) by mouth every 6 (six) hours. (Patient taking differently: Take 4 mg by mouth as needed. ), Disp: 12 tablet, Rfl: 0    pen needle, diabetic (BD ULTRA-FINE SHORT PEN NEEDLE) 31 gauge x 5/16" Ndle, INJECT 1 DEVICE INTO THE SKIN 3 TIMES DAILY., Disp: 270 each, Rfl: 3    potassium chloride SA (K-DUR,KLOR-CON) 20 MEQ tablet, Take 1 tablet (20 mEq total) by mouth once daily., Disp: 90 tablet, Rfl: 1    pyridostigmine (MESTINON) 60 mg Tab, TAKE 1 TABLET FOUR TIMES A DAY, Disp: 360 tablet, Rfl: 3    SITagliptin (JANUVIA) 100 MG Tab, Take 1 tablet (100 mg total) by mouth once daily., Disp: 30 tablet, Rfl: 11    vitamin B comp and C no.3 15-10-50-5-300 mg Cap, 1 po q day, Disp: 90 capsule, Rfl: 3    diazePAM (VALIUM) 2 MG tablet, Take 1 tablet (2 mg total) by mouth every 8 (eight) hours as needed (muscle spasm). Do not take until tomorrow morning.  Take one dose, and if you experience weakness, take mestinon as directed., Disp: 10 tablet, Rfl: 0    metFORMIN (GLUCOPHAGE-XR) 500 MG XR 24hr tablet, Take two tablets by mouth twice daily., Disp: 60 tablet, Rfl: 11  No current facility-administered medications for this visit.      Review of patient's allergies indicates:   Allergen Reactions    Ramipril      Other reaction(s): caused a severe cough        Objective:   /64   Pulse 69   Ht " "5' 3" (1.6 m)   Wt 96.8 kg (213 lb 6.5 oz)   SpO2 98%   BMI 37.80 kg/m²   BP Readings from Last 3 Encounters:   03/16/20 134/82   03/16/20 114/64   03/13/20 (!) 160/82     Wt Readings from Last 3 Encounters:   03/16/20 1139 96.8 kg (213 lb 6.5 oz)   03/16/20 0929 96.8 kg (213 lb 6.5 oz)   03/13/20 1336 96.2 kg (212 lb)          Physical Exam   Constitutional: She is oriented to person, place, and time. She appears well-developed and well-nourished. No distress.   HENT:   Head: Normocephalic and atraumatic.   Right Ear: External ear normal.   Left Ear: External ear normal.   Eyes: Right eye exhibits no discharge. Left eye exhibits no discharge. No scleral icterus.   Neck: No JVD present.   Pulmonary/Chest: Effort normal. No respiratory distress.   Abdominal: Soft. She exhibits no distension. There is no tenderness.   Obese, soft, no lipo hypertrophy.   Musculoskeletal: She exhibits edema.   Neurological: She is alert and oriented to person, place, and time. No cranial nerve deficit.   Psychiatric: She has a normal mood and affect. Thought content normal.   Vitals reviewed.        Lab Results   Component Value Date     03/13/2020    K 3.8 03/13/2020     03/13/2020    CO2 28 03/13/2020    BUN 12 03/13/2020    CREATININE 0.60 03/13/2020     (H) 03/13/2020    HGBA1C 10.7 (H) 02/03/2020    MG 1.8 02/26/2019    AST 37 (H) 02/10/2020    AST 44 (H) 03/05/2016    ALT 20 02/10/2020    ALBUMIN 3.9 02/10/2020    PROT 7.7 02/10/2020    BILITOT 0.4 02/10/2020    WBC 4.49 11/01/2019    HGB 13.1 11/01/2019    HCT 40.8 11/01/2019    MCV 96 11/01/2019    MCH 30.8 11/01/2019     11/01/2019    MPV 11.1 11/01/2019    GRAN 2.4 11/01/2019    GRAN 53.2 11/01/2019    LYMPH 1.6 11/01/2019    LYMPH 36.1 11/01/2019    CHOL 175 11/01/2019    HDL 44 11/01/2019    LDLCALC 116.4 11/01/2019    TRIG 73 11/01/2019       Lab Results   Component Value Date    TSH 0.846 11/01/2019    FREET4 0.81 03/05/2016        Thyroid Labs " Latest Ref Rng & Units 2/3/2020 2/10/2020 3/13/2020   TSH 0.400 - 4.000 uIU/mL - - -   Free T4 0.78 - 2.19 ng/dL - - -   Sodium 136 - 145 mmol/L 136 140 138   Potassium 3.5 - 5.1 mmol/L 4.1 3.1(L) 3.8   Chloride 95 - 110 mmol/L 104 105 104   Carbon Dioxide 22 - 31 mmol/L 19(L) 27 28   Glucose 70 - 110 mg/dL 275(H) 76 126(H)   Blood Urea Nitrogen 7 - 18 mg/dL 11 11 12   Creatinine 0.50 - 1.40 mg/dL 0.9 0.59 0.60   Calcium 8.4 - 10.2 mg/dL 9.6 9.5 9.3   Total Protein 6.0 - 8.4 g/dL 8.5(H) 7.7 -   Albumin 3.5 - 5.2 g/dL 3.6 3.9 -   Total Bilirubin 0.2 - 1.3 mg/dL 0.2 0.4 -   AST 14 - 36 U/L 51(H) 37(H) -   ALT 0 - 35 U/L 27 20 -   Anion Gap 8 - 16 mmol/L 13 8 6(L)   eGFR (African American) >60 mL/min/1.73 m:2 >60.0 >60 >60   eGFR (Non-African American) >60 mL/min/1.73 m:2 >60.0 >60 >60   WBC 3.90 - 12.70 K/uL - - -   RBC 4.00 - 5.40 M/uL - - -   Hemoglobin 12.0 - 16.0 g/dL - - -   Hematocrit 37.0 - 48.5 % - - -   MCV 82 - 98 fL - - -   MCH 27.0 - 31.0 pg - - -   MCHC 32.0 - 36.0 g/dL - - -   RDW 11.5 - 14.5 % - - -   Platelets 150 - 350 K/uL - - -   MPV 9.2 - 12.9 fL - - -   Gran # 1.8 - 7.7 K/uL - - -   Lymph # 1.0 - 4.8 K/uL - - -   Mono # 0.3 - 1.0 K/uL - - -   Eos # 0.0 - 0.5 K/uL - - -   Baso # 0.00 - 0.20 K/uL - - -   Gran % 38.0 - 73.0 % - - -   Lymph % 18.0 - 48.0 % - - -   Mono% 4.0 - 15.0 % - - -   Eos % 0.0 - 8.0 % - - -   Baso % 0.0 - 1.9 % - - -   INR - - - -           Hemoglobin A1C   Date Value Ref Range Status   02/03/2020 10.7 (H) 4.0 - 5.6 % Final     Comment:     ADA Screening Guidelines:  5.7-6.4%  Consistent with prediabetes  >or=6.5%  Consistent with diabetes  High levels of fetal hemoglobin interfere with the HbA1C  assay. Heterozygous hemoglobin variants (HbS, HgC, etc)do  not significantly interfere with this assay.   However, presence of multiple variants may affect accuracy.     11/01/2019 10.2 (H) 0.0 - 5.6 % Final     Comment:     Reference Interval:  5.0 - 5.6 Normal   5.7 - 6.4 High Risk    > 6.5 Diabetic    Hgb A1c results are standardized based on the (NGSP) National   Glycohemoglobin Standardization Program.    Hemoglobin A1C levels are related to mean serum/plasma glucose   during the preceding 2-3 months.        07/18/2019 9.5 (H) 0.0 - 5.6 % Final     Comment:     Reference Interval:  5.0 - 5.6 Normal   5.7 - 6.4 High Risk   > 6.5 Diabetic    Hgb A1c results are standardized based on the (NGSP) National   Glycohemoglobin Standardization Program.    Hemoglobin A1C levels are related to mean serum/plasma glucose   during the preceding 2-3 months.          DXA Bone Density Spine And Hip   Order: [id number]   Status:  Final result   Visible to patient:  No (Not Released) Next appt:  Today at 10:30 AM in Infusion Therapy (CHA[facility]) Dx:  Postmenopausal state   Details     Reading Physician Reading Date Result Priority   Theodore Ryan MD 3/12/2020 Routine      Narrative     EXAMINATION:  DEXA BONE DENSITY SPINE HIP03/12/2020    CLINICAL HISTORY:  Screening,postmenopausal    TECHNIQUE:  DEXA images are obtained. FRAX measurement is provided.    COMPARISON:  No recent comparison studies currently available.    FINDINGS:  Spine bone mineral density is 1.184 g/cm 2 with T-score -0.1 and Z-score -0.2.    Femoral neck mean bone mineral density measurement is 0.793 g/cm 2 with T-score -1.3 and Z-score -0.4.    FRAX measurement is provided of 10 year major osteoporotic fracture 7.7 % and hip fracture 0.7 %.      Impression       Bone mineral density measurements correspond to osteopenic overall category.  Fracture risk is moderate.                 Assessment and plan:       Problem List Items Addressed This Visit        Cardiac/Vascular    Essential hypertension    Current Assessment & Plan     BP at goal. Continue current meds.         Hyperlipidemia    Current Assessment & Plan     Continue statin. Last FLP Nov 2019- , TG 73.            Endocrine    Vitamin D deficiency (Chronic)    Current  Assessment & Plan     On replacement. 24 hr urine calcium 0.013. Recheck levels prior to next visit.         Relevant Orders    Vitamin D    Type 2 diabetes mellitus with circulatory disorder - Primary    Current Assessment & Plan     Pt with macrovascular complication of CVA.  BG improved but has some hypoglycemia. Pt has not been checking BG before meals and bedtime.  Hypoglycemia likely due to taking Novolog despite low carb content in food and excess basal insulin. Discussed with pt only to take 3-4 units of Novolog if eating less than 30 grams of carbs.   HbA1C 10.2 Nov 2019. Recheck at next visit.   Continue Januvia. No hx of pancreatitis or MEN syndrome.    Continue current dose of Basaglar 56 units daily.  Change metformin to 500 mg XR tabs. Increase as tolerated to 1000 mg BID. Informed pt to take with two largest meals. Has some loose stools. Egfr >60.  Check BG before meals and bedtime. Bring log to next visit.   Call MD in 2 weeks with blood glucose log.  Last eye exam <1 yr ago and no DPR per pt.  Last urine micro wnls June 2019.  Discussed proper foot care.  Counseled pt on low carb/low fat diet and to increase physical activity.  Last fasting lipid panel Nov 2019.  On statin  BP at goal.                 Relevant Medications    metFORMIN (GLUCOPHAGE-XR) 500 MG XR 24hr tablet    Other Relevant Orders    Hemoglobin A1c    Hypothyroidism    Current Assessment & Plan     Clinically euthyroid. TSH 0.846 wnls Nov 2019.         Hyperparathyroidism    Current Assessment & Plan     Etiology unclear. Suspect PHPT. Most recent calcium wnls. In the past calcium mildly elevated ? this occurred while on HCTZ. Pt has nl renal function. Check renal US to check for asymptomatic stones. She does have osteopenia. Suspect a component of hyperpara may be due to diuretic use. Will ask PCP to switch pt to spironolactone in place of lasixs to tx HTN. Unlike loop diuretics, RAAS inhibitors typically do not increase PTH levels.  Recheck PTH prior to next visit and hopefully pt off lasixs by then.          Relevant Orders    US Kidney    PTH, intact    Phosphorus       Orthopedic    Osteopenia of neck of femur    Current Assessment & Plan     Pt with osteopenia. ?if exacerbated by elevated parathyroid hormone. Discussed fall precautions. On vit D replacement. Needs calcium 1000 mg daily (preferrably from food sources). No indication for further intervention at this point.                RTC in 3 months with HbA1C, PTH, phos, vitamin D and renal US  Can do renal US now

## 2020-03-16 NOTE — ASSESSMENT & PLAN NOTE
Pt with macrovascular complication of CVA.  BG improved but has some hypoglycemia. Pt has not been checking BG before meals and bedtime.  Hypoglycemia likely due to taking Novolog despite low carb content in food and excess basal insulin. Discussed with pt only to take 3-4 units of Novolog if eating less than 30 grams of carbs.   HbA1C 10.2 Nov 2019. Recheck at next visit.   Continue Januvia. No hx of pancreatitis or MEN syndrome.    Continue current dose of Basaglar 56 units daily.  Change metformin to 500 mg XR tabs. Increase as tolerated to 1000 mg BID. Informed pt to take with two largest meals. Has some loose stools. Egfr >60.  Check BG before meals and bedtime. Bring log to next visit.   Call MD in 2 weeks with blood glucose log.  Last eye exam <1 yr ago and no DPR per pt.  Last urine micro wnls June 2019.  Discussed proper foot care.  Counseled pt on low carb/low fat diet and to increase physical activity.  Last fasting lipid panel Nov 2019.  On statin  BP at goal.

## 2020-03-16 NOTE — TELEPHONE ENCOUNTER
----- Message from Federico Monge sent at 3/16/2020 10:41 AM CDT -----  Contact: [facility], Debbie Lewis need new orders patient is waiting please call back at [phone]    Case number [id number]

## 2020-03-16 NOTE — TELEPHONE ENCOUNTER
Returned call to [facility] and notified that call number is back in working order and to please call dr. emmanuel for orders. Verbalized understanding.

## 2020-03-16 NOTE — TELEPHONE ENCOUNTER
----- Message from Luis Ward sent at 3/16/2020  8:20 AM CDT -----  Contact: [facility] infusion [facility]  [phone]  Fax [phone]  Type: Needs Medical Advice    Who Called: [facility] infusion [facility]  [phone]    Additional Information:     Advised need order for PRIVIGEN. Please issue order as soon as possible, please fax updated order as soon as possible.

## 2020-03-16 NOTE — LETTER
March 16, 2020      Sherice Gerardo MD  [address]  [facility] [place] [address]           [facility]  [address] [place] [address]  Phone: [phone]  Fax: [phone]          Patient: Annie Mason   MR Number: [id number]   YOB: 1955   Date of Visit: 3/16/2020       Dear Dr. Sherice Gerardo:    Thank you for referring Annie Mason to me for evaluation. Attached you will find relevant portions of my assessment and plan of care.    If you have questions, please do not hesitate to call me. I look forward to following Annie Mason along with you.    Sincerely,    Juliette L. Sandifer Kum-Nji, MD    Enclosure  CC:  No Recipients    If you would like to receive this communication electronically, please contact [email or web address] or [phone] to request more information on [employer] Link access.    For providers and/or their staff who would like to refer a patient to [facility], please contact us through our one-stop-shop provider referral line, [facility], at [phone].    If you feel you have received this communication in error or would no longer like to receive these types of communications, please e-mail [email or web address]

## 2020-03-25 ENCOUNTER — TELEPHONE (OUTPATIENT)
Dept: NEUROLOGY | Facility: CLINIC | Age: 65
End: 2020-03-25

## 2020-03-25 NOTE — TELEPHONE ENCOUNTER
I spoke with pt to inform her that we will be rescheduling her appointment on march 30 2020 due to COVID 19.

## 2020-03-29 DIAGNOSIS — E55.9 HYPOVITAMINOSIS D: ICD-10-CM

## 2020-03-30 RX ORDER — ERGOCALCIFEROL 1.25 MG/1
CAPSULE ORAL
Qty: 4 CAPSULE | Refills: 1 | Status: SHIPPED | OUTPATIENT
Start: 2020-03-30 | End: 2020-04-22

## 2020-04-13 ENCOUNTER — TELEPHONE (OUTPATIENT)
Dept: NEUROLOGY | Facility: CLINIC | Age: 65
End: 2020-04-13

## 2020-04-13 RX ORDER — DIPHENHYDRAMINE HYDROCHLORIDE 50 MG/ML
25 INJECTION INTRAMUSCULAR; INTRAVENOUS
Status: CANCELLED | OUTPATIENT
Start: 2020-04-13

## 2020-04-13 RX ORDER — HEPARIN 100 UNIT/ML
500 SYRINGE INTRAVENOUS
Status: CANCELLED | OUTPATIENT
Start: 2020-04-13

## 2020-04-13 RX ORDER — ACETAMINOPHEN 500 MG
500 TABLET ORAL
Status: CANCELLED | OUTPATIENT
Start: 2020-04-13

## 2020-04-13 RX ORDER — SODIUM CHLORIDE 0.9 % (FLUSH) 0.9 %
10 SYRINGE (ML) INJECTION
Status: CANCELLED | OUTPATIENT
Start: 2020-04-13

## 2020-04-13 NOTE — TELEPHONE ENCOUNTER
Returned call to norbert and notified her that infusion/theray plan updated. Verbalized understanding.

## 2020-04-13 NOTE — TELEPHONE ENCOUNTER
----- Message from Rene Peña sent at 4/13/2020  2:29 PM CDT -----  Contact: [facility]  Type: Needs Medical Advice  Who Called:  [facility] Camarena Call Back Number: [phone]  Additional Information: needs Orders for IVIG or the pt will not be seen tomorrow. Please call to advise

## 2020-04-22 DIAGNOSIS — E55.9 HYPOVITAMINOSIS D: ICD-10-CM

## 2020-04-22 RX ORDER — ERGOCALCIFEROL 1.25 MG/1
CAPSULE ORAL
Qty: 4 CAPSULE | Refills: 1 | Status: SHIPPED | OUTPATIENT
Start: 2020-04-22 | End: 2020-06-15

## 2020-05-25 NOTE — TELEPHONE ENCOUNTER
It is noted that on 5/11 the Rx for Duloxetine 60mg was discontinued by Donna Richards.  The after visit summary from that day notes to only be taking Duloxetine 20mg daily, the 60mg Rx was discontinued.    Patient will need to be contacted to clarify what dose she is taking to verify what prescription needs filled.

## 2020-05-27 ENCOUNTER — TELEPHONE (OUTPATIENT)
Dept: NEUROLOGY | Facility: CLINIC | Age: 65
End: 2020-05-27

## 2020-05-27 ENCOUNTER — OFFICE VISIT (OUTPATIENT)
Dept: NEUROLOGY | Facility: CLINIC | Age: 65
End: 2020-05-27
Payer: MEDICARE

## 2020-05-27 VITALS
WEIGHT: 208.25 LBS | DIASTOLIC BLOOD PRESSURE: 67 MMHG | RESPIRATION RATE: 20 BRPM | TEMPERATURE: 98 F | HEART RATE: 79 BPM | BODY MASS INDEX: 36.9 KG/M2 | HEIGHT: 63 IN | SYSTOLIC BLOOD PRESSURE: 153 MMHG

## 2020-05-27 DIAGNOSIS — G70.00 MYASTHENIA GRAVIS: Primary | ICD-10-CM

## 2020-05-27 PROCEDURE — 99214 OFFICE O/P EST MOD 30 MIN: CPT | Mod: S$PBB,,, | Performed by: PSYCHIATRY & NEUROLOGY

## 2020-05-27 PROCEDURE — 99213 OFFICE O/P EST LOW 20 MIN: CPT | Mod: PBBFAC,PN | Performed by: PSYCHIATRY & NEUROLOGY

## 2020-05-27 PROCEDURE — 99214 PR OFFICE/OUTPT VISIT, EST, LEVL IV, 30-39 MIN: ICD-10-PCS | Mod: S$PBB,,, | Performed by: PSYCHIATRY & NEUROLOGY

## 2020-05-27 PROCEDURE — 99999 PR PBB SHADOW E&M-EST. PATIENT-LVL III: CPT | Mod: PBBFAC,,, | Performed by: PSYCHIATRY & NEUROLOGY

## 2020-05-27 PROCEDURE — 99999 PR PBB SHADOW E&M-EST. PATIENT-LVL III: ICD-10-PCS | Mod: PBBFAC,,, | Performed by: PSYCHIATRY & NEUROLOGY

## 2020-05-27 NOTE — PROGRESS NOTES
"Date of service:  5/27/2020    Chief complaint:  Myasthenia gravis    Interval history:  The patient is a 64 y.o. female seen previously for MG, originally diagnosed in 1992 who is S/P thymectomy.  She has continued on IVIG.  She has seen significant improvement since starting that therapy, and she feels that the q4week infusion regimen has been helpful for her.   Regarding her CTS and peripheral neuropathy, this has been largely stable.  She has no new complaints.    History of present illness:  The patient is a 64 y.o. female referred for evaluation of myasthenia gravis. She states that she was diagnosed with this in 1992.  She was seen for this in [place] by Dr. Evans.  Her workup included a Tensilon test.  She is not sure if antibodies were sent.  She never had RNS.  Her symptoms at that time involved diplopia and generalized weakness.  Her management has consisted of steroids, Mestinon, plasmaphoresis, IVIG, and thymectomy.  She she is not sure if she had a thymoma.  Presently, she continues to have issues with weakness in the extremities, ptosis, and difficulty swallowing (both solids and liquids) at times.  She feels as though she has been doing relatively well recently.  Her previous neurologist apparently told her to take the Mestinon on a prn basis.  She has had some issues with GI discomfort from taking the Mestinon periodically.  She is not on any scheduled immunotherapy but has been receiving IVIG treatments as rescue repeatedly in recent years.        Past Medical History:   Diagnosis Date    Allergy     Anticoagulant long-term use     Brachial plexitis     Depression     Diabetes mellitus, type 2     GERD (gastroesophageal reflux disease)     Hyperlipidemia 2/4/2015    Hypertension     Hypothyroidism     MG (myasthenia gravis)     Mild intermittent asthma with acute exacerbation 12/26/2017    Myasthenia gravis without exacerbation 3/25/2015    Seizures     "years ago"    Stroke     " 2008       Past Surgical History:   Procedure Laterality Date    CHOLECYSTECTOMY      CORONARY ANGIOGRAPHY N/A 1/28/2019    Procedure: ANGIOGRAM, CORONARY ARTERY;  Surgeon: Ike Jasso III, MD;  [facility];  Service: Cardiology;  Laterality: N/A;    LEFT HEART CATHETERIZATION Left 1/28/2019    Procedure: Left heart cath;  Surgeon: Ike Jasso III, MD;  [facility];  Service: Cardiology;  Laterality: Left;    PORTACATH PLACEMENT  12/14/2016    thalmusectomy      TUBAL LIGATION     Thymectomy, not thalmusectomy      Family History   Problem Relation Age of Onset    Diabetes Mother     Hypertension Mother     Hypertension Father     Stroke Father     Ovarian cancer Maternal Aunt     Breast cancer Maternal Aunt         age unknown    Myasthenia gravis Paternal Aunt     Diabetes type I Son     Breast cancer Sister 64       Social History     Socioeconomic History    Marital status: Single     Spouse name: Not on file    Number of children: 12    Years of education: Not on file    Highest education level: Not on file   Occupational History    Occupation: disabled   Social Needs    Financial resource strain: Not on file    Food insecurity:     Worry: Not on file     Inability: Not on file    Transportation needs:     Medical: Not on file     Non-medical: Not on file   Tobacco Use    Smoking status: Never Smoker    Smokeless tobacco: Never Used   Substance and Sexual Activity    Alcohol use: No    Drug use: No    Sexual activity: Not on file   Lifestyle    Physical activity:     Days per week: Not on file     Minutes per session: Not on file    Stress: Not on file   Relationships    Social connections:     Talks on phone: Not on file     Gets together: Not on file     Attends [employer] service: Not on file     Active member of club or organization: Not on file     Attends meetings of clubs or organizations: Not on file     Relationship status: Not on file   Other Topics  "Concern    Not on file   Social History Narrative    Had 12 living children, did lose a set of triplets that were very premature; 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   Denies T/E/D    Review of Systems  A comprehensive ROS was previously performed.  It is not significantly changed except as noted above.     Physical exam:  BP (!) 153/67   Pulse 79   Temp 98.3 °F (36.8 °C)   Resp 20   Ht 5' 3" (1.6 m)   Wt 94.4 kg (208 lb 3.6 oz)   BMI 36.89 kg/m²    General: Well developed, obese.  No acute distress.  HEENT: Atraumatic, normocephalic.  Neck: Supple, trachea midline.  Cardiovascular: Regular rate and rhythm.  Pulmonary: No increased work of breathing.  Able to count to 35 in a single breath.  Abdomen/GI: No guarding.  Musculoskeletal: No obvious joint deformities, moves all extremities well.    Neurological exam:  Mental status:  Awake and alert.  Oriented x4.  Speech fluent and appropriate.  Somewhat hoarse.  Recent and remote memory appear to be intact.  Fund of knowledge normal.  Cranial nerves: Pupils equal round and reactive to light, extraocular movements intact, facial strength and sensation intact bilaterally, palate and tongue midline, hearing grossly intact bilaterally.  Motor: 5 out of 5 strength throughout the upper and lower extremities bilaterally without fatigability noted on pump handle. Normal bulk and tone.  Sensation: Intact to light touch and temperature bilaterally.  DTR: 1+ at the knees and biceps bilaterally.  Coordination: Finger-nose-finger testing intact bilaterally.  Gait: Antalgic gait.    Data base:  Records from her visit in the neuromuscular clinic at [facility] were not available for review at the time of our visit.  We will request them.    Recent labs include a BMP with mild hyperglycemia.  Antibodies for MG were negative.    Assessment and plan:  The patient is a 64 y.o. female with a reported history of MG.  I think she does have this disorder.  We will continue " her IVIG every 4 weeks as she is happy with how she is feeling.  She is to continue taking her Mestinon TID.  We will check serologies.  I would like for her to see Dr. Nava to assess for therapeutic alternatives.  We will check serologies.  She understands that if her breathing worsens, her speech changes, swallowing issues/choking arises, she begins to have falls, or any other safety issues arise, she should present immediately to the ER.      Regarding her CTS and neuropathy, she will continue her Cymbalta.  Medication side effects were discussed.  She has also been instructed to wear wrist splints for the former. If she fails conservative management, we will consider referral to neurosurgery for her CTS.

## 2020-06-03 RX ORDER — DULOXETIN HYDROCHLORIDE 60 MG/1
CAPSULE, DELAYED RELEASE ORAL
Qty: 90 CAPSULE | Refills: 3 | OUTPATIENT
Start: 2020-06-03

## 2020-06-12 ENCOUNTER — TELEPHONE (OUTPATIENT)
Dept: NEUROLOGY | Facility: CLINIC | Age: 65
End: 2020-06-12

## 2020-06-12 NOTE — TELEPHONE ENCOUNTER
Meggan called from [facility] infusion suite stating Ms Mason IV port is dysfunctioning.  Advised Meggan to contact surgeon to interrogate malfunctioning port.

## 2020-06-12 NOTE — TELEPHONE ENCOUNTER
----- Message from Chandra Mullins sent at 6/12/2020 11:53 AM CDT -----  Contact: Meggan  Type:  Patient Returning Call    Who Called:  [facility] Infusion suit  Does the patient know what this is regarding?:  Please follow up  Best Call Back Number:    Additional Information:  Please follow up

## 2020-06-16 ENCOUNTER — OFFICE VISIT (OUTPATIENT)
Dept: ENDOCRINOLOGY | Facility: CLINIC | Age: 65
End: 2020-06-16
Payer: MEDICARE

## 2020-06-16 VITALS
OXYGEN SATURATION: 96 % | BODY MASS INDEX: 36.7 KG/M2 | DIASTOLIC BLOOD PRESSURE: 74 MMHG | SYSTOLIC BLOOD PRESSURE: 112 MMHG | HEART RATE: 80 BPM | WEIGHT: 207.13 LBS | HEIGHT: 63 IN

## 2020-06-16 DIAGNOSIS — E78.2 MIXED HYPERLIPIDEMIA: ICD-10-CM

## 2020-06-16 DIAGNOSIS — Z79.4 TYPE 2 DIABETES MELLITUS WITH OTHER CIRCULATORY COMPLICATION, WITH LONG-TERM CURRENT USE OF INSULIN: Primary | ICD-10-CM

## 2020-06-16 DIAGNOSIS — M85.859 OSTEOPENIA OF NECK OF FEMUR, UNSPECIFIED LATERALITY: ICD-10-CM

## 2020-06-16 DIAGNOSIS — E03.4 HYPOTHYROIDISM DUE TO ACQUIRED ATROPHY OF THYROID: ICD-10-CM

## 2020-06-16 DIAGNOSIS — E21.3 HYPERPARATHYROIDISM: ICD-10-CM

## 2020-06-16 DIAGNOSIS — E11.59 TYPE 2 DIABETES MELLITUS WITH OTHER CIRCULATORY COMPLICATION, WITH LONG-TERM CURRENT USE OF INSULIN: Primary | ICD-10-CM

## 2020-06-16 DIAGNOSIS — E66.01 CLASS 2 SEVERE OBESITY DUE TO EXCESS CALORIES WITH SERIOUS COMORBIDITY AND BODY MASS INDEX (BMI) OF 36.0 TO 36.9 IN ADULT: ICD-10-CM

## 2020-06-16 DIAGNOSIS — I10 ESSENTIAL HYPERTENSION: ICD-10-CM

## 2020-06-16 DIAGNOSIS — E55.9 VITAMIN D DEFICIENCY: Chronic | ICD-10-CM

## 2020-06-16 PROCEDURE — 99999 PR PBB SHADOW E&M-EST. PATIENT-LVL V: CPT | Mod: PBBFAC,,, | Performed by: INTERNAL MEDICINE

## 2020-06-16 PROCEDURE — 99215 OFFICE O/P EST HI 40 MIN: CPT | Mod: PBBFAC,PO | Performed by: INTERNAL MEDICINE

## 2020-06-16 PROCEDURE — 99215 OFFICE O/P EST HI 40 MIN: CPT | Mod: S$PBB,,, | Performed by: INTERNAL MEDICINE

## 2020-06-16 PROCEDURE — 99999 PR PBB SHADOW E&M-EST. PATIENT-LVL V: ICD-10-PCS | Mod: PBBFAC,,, | Performed by: INTERNAL MEDICINE

## 2020-06-16 PROCEDURE — 99215 PR OFFICE/OUTPT VISIT, EST, LEVL V, 40-54 MIN: ICD-10-PCS | Mod: S$PBB,,, | Performed by: INTERNAL MEDICINE

## 2020-06-16 RX ORDER — DAPAGLIFLOZIN 5 MG/1
5 TABLET, FILM COATED ORAL DAILY
Qty: 30 TABLET | Refills: 11 | Status: SHIPPED | OUTPATIENT
Start: 2020-06-16 | End: 2021-01-21

## 2020-06-16 RX ORDER — INSULIN PUMP SYRINGE, 3 ML
EACH MISCELLANEOUS
Qty: 1 EACH | Refills: 1 | Status: SHIPPED | OUTPATIENT
Start: 2020-06-16 | End: 2021-01-21 | Stop reason: SDUPTHER

## 2020-06-16 NOTE — ASSESSMENT & PLAN NOTE
Pt with macrovascular complication of CVA.  BG not at goal and pt has not been checking BG recently.   HbA1C previously 10.2019 now 9. Goal is 7% if this can be obtained without hypoglycemia.  Change Basaglar to 35 units daily  Increase Novolog to 12 units with meals. If pre-meal blood sugar less than 100, reduce Novolog by 3-6 units.   Continue Januvia.  Discussed switching to  agonist in the future since we can get better A1c reduction and some weight loss with those meds.  Start Farxiga.  Discussed side effect of euglycemic DKA, yeast infection, and UTIs.  Patient with slightly increased lower extremity edema since stopping Lasix.  Discussed that these medicine should also help with diuresis.  Resume metformin.  Check BG before meals and bedtime. Bring log to next visit.   Call MD in 2 weeks with blood glucose log.  Last eye exam <1 yr ago and no DPR per pt.  Last urine micro wnls 2019. Check with next A1C in 3 months.   Discussed proper foot care.  Counseled pt on low carb/low fat diet and to increase physical activity.  Last fasting lipid panel 2019.  On statin.  Pt with h/o low B12 and on Metformin. Recheck levels.

## 2020-06-16 NOTE — PROGRESS NOTES
Subjective:    Patient ID:  Annie Mason is a 64 y.o. female.    Chief Complaint:  Diabetes      Pt presents to follow up for type 2 diabetes and HPT.     Overview: Onset of Type 2 diabetes mellitus was < 10 years ago. Has history of CVA but no other macrovascular complications. No microvascular complications. No history of DKA.   Cardiovascular risk factors: advanced age (older than 55 for men, 65 for women), diabetes mellitus, dyslipidemia, hypertension and sedentary lifestyle. In the past, she was not checking blood sugar due to giving her meter to her son. Last eye exam was about 1 year ago.  No hx of DR per pt. No history of pancreatitis or MEN syndrome.       History of hypothyroidism and myasthenia gravis. Takes levothyroxine. No issues swallowing or voice changes.     Prior to her last visit, she went to PCP for leg swelling. Was dx'ed with cellulitis. Tx'ed with abx. Had been taking lasix for about one year. PCP switched to spironolactone. Notes some increase in fluid rentention in legs.      Currently taking:  Novolog 7 units w/ meals  Basaglar 56 units at night  Januvia 100. No hx of pancreatitis or MEN syndrome.   Prescribed metformin but stopped due to concern it could worsen leg swelling.     Loss her BG meter over a month ago. Last HbA1C was 9, previously 10.6. Saw diabetic educator in the past. Plans to keep working on portion size. Denies snacking between meals. Typically eats 3 meals a day. Notes often gets hungry around 9 pm. Snacks on tuna fish with crackers in during this time.     Does not exercise regularly.     In regards to HPT, denies history of fractures. Had kidney stone once several years ago. Is taking ergocalciferol once weekly. No recent falls. Has been taking lasix for about one year but now on Spironolactone. Did not do renal US or dexa of forearm.             Diabetes Management Status    Statin: Taking  ACE/ARB: Taking    Screening or Prevention Patient's value Goal  "Complete/Controlled?   HgA1C Testing and Control   Lab Results   Component Value Date    HGBA1C 9.0 (H) 06/09/2020      Annually/Less than 8% No   Lipid profile : 11/01/2019 Annually Yes   LDL control Lab Results   Component Value Date    LDLCALC 116.4 11/01/2019    Annually/Less than 100 mg/dl  No   Nephropathy screening Lab Results   Component Value Date    LABMICR <6.0 07/18/2019     Lab Results   Component Value Date    PROTEINUA Negative 07/19/2019    Annually Yes   Blood pressure BP Readings from Last 1 Encounters:   06/16/20 112/74    Less than 140/90 Yes   Dilated retinal exam : 01/15/2018 Annually Yes   Foot exam   : 01/30/2020 Annually Yes     Review of Systems   Constitutional: Negative for unexpected weight change.   Eyes: Negative for visual disturbance.   Respiratory: Negative for shortness of breath.    Cardiovascular: Positive for leg swelling. Negative for chest pain.   Gastrointestinal: Negative for abdominal pain, constipation, diarrhea, nausea and vomiting.   Endocrine: Positive for polyuria. Negative for polydipsia and polyphagia.   Musculoskeletal: Negative for myalgias.   Skin: Negative for wound.   Neurological: Negative for numbness and headaches.   Hematological: Negative for adenopathy.   Psychiatric/Behavioral: Negative for sleep disturbance.        Past Medical History:   Diagnosis Date    Allergy     Anticoagulant long-term use     Brachial plexitis     Depression     Diabetes mellitus, type 2     GERD (gastroesophageal reflux disease)     Hyperlipidemia 2/4/2015    Hypertension     Hypothyroidism     MG (myasthenia gravis)     Mild intermittent asthma with acute exacerbation 12/26/2017    Myasthenia gravis without exacerbation 3/25/2015    Seizures     "years ago"    Stroke     2008      Social History     Tobacco Use    Smoking status: Never Smoker    Smokeless tobacco: Never Used   Substance Use Topics    Alcohol use: No    Drug use: No     Family History   Problem " "Relation Age of Onset    Diabetes Mother     Hypertension Mother     Hypertension Father     Stroke Father     Ovarian cancer Maternal Aunt     Breast cancer Maternal Aunt         age unknown    Myasthenia gravis Paternal Aunt     Diabetes type I Son     Breast cancer Sister 64      Past Surgical History:   Procedure Laterality Date    CHOLECYSTECTOMY      CORONARY ANGIOGRAPHY N/A 1/28/2019    Procedure: ANGIOGRAM, CORONARY ARTERY;  Surgeon: Ike Jasso III, MD;  Location: [facility] CATH;  Service: Cardiology;  Laterality: N/A;    LEFT HEART CATHETERIZATION Left 1/28/2019    Procedure: Left heart cath;  Surgeon: Ike Jasso III, MD;  Location: [facility] CATH;  Service: Cardiology;  Laterality: Left;    PORTACATH PLACEMENT  12/14/2016    thalmusectomy      TUBAL LIGATION            Current Outpatient Medications:     albuterol (PROVENTIL) 2.5 mg /3 mL (0.083 %) nebulizer solution, Take 2.5 mg by nebulization every 6 (six) hours as needed for Wheezing. Rescue, Disp: , Rfl:     albuterol sulfate (PROAIR RESPICLICK) 90 mcg/actuation AePB, Inhale 180 mcg into the lungs every 4 (four) hours. Rescue, Disp: , Rfl:     aspirin (ECOTRIN) 81 MG EC tablet, Take 81 mg by mouth once daily., Disp: , Rfl:     atorvastatin (LIPITOR) 40 MG tablet, Take 1 tablet (40 mg total) by mouth once daily., Disp: 90 tablet, Rfl: 3    BD VEO INSULIN SYRINGE UF 0.3 mL 31 gauge x 15/64" Syrg, USE TO INJECT NOVOLOG 3 TIMES DAILY, Disp: , Rfl: 3    BD VEO INSULIN SYRINGE UF 0.3 mL 31 gauge x 15/64" Syrg, USE TO INJECT NOVOLOG 3 TIMES DAILY, Disp: 300 Syringe, Rfl: 3    blood pressure monitor Kit, Use to check blood pressure daily, Disp: , Rfl: 0    blood sugar diagnostic (ONETOUCH VERIO) Strp, Check blood sugar 4 times daily.  DX E11.65., Disp: 200 strip, Rfl: 11    blood-glucose meter kit, Please check blood sugar four times daily and as needed. Re: Diabetes E11.65, Disp: 1 each, Rfl: 1    carvedilol (COREG) 25 MG tablet, TAKE " 1 TABLET BY MOUTH TWICE A DAY WITH FOOD, Disp: 180 tablet, Rfl: 3    compress.stocking,knee,reg,med Misc, 1 Pair by Misc.(Non-Drug; Combo Route) route once daily. 15-20 mmHg, Disp: 2 each, Rfl: 1    diclofenac sodium (VOLTAREN) 1 % Gel, Apply 2 g topically 3 (three) times daily. To neck for pain, Disp: 100 g, Rfl: 0    DULoxetine (CYMBALTA) 20 MG capsule, Take 20 mg by mouth once daily., Disp: , Rfl:     ergocalciferol (ERGOCALCIFEROL) 50,000 unit Cap, TAKE 1 CAPSULE BY MOUTH ONE TIME PER WEEK, Disp: 4 capsule, Rfl: 1    fluocinonide 0.05% (LIDEX) 0.05 % cream, Apply topically 2 (two) times daily., Disp: 1 Tube, Rfl: 3    fluticasone (FLONASE) 50 mcg/actuation nasal spray, 2 sprays each  nostril bid x 1 week then q day prn  nasal congestion or sneezing (Patient taking differently: as needed. 2 sprays each  nostril bid x 1 week then q day prn  nasal congestion or sneezing), Disp: 1 Bottle, Rfl: 3    fluticasone-vilanterol (BREO ELLIPTA) 200-25 mcg/dose DsDv diskus inhaler, Inhale 1 puff into the lungs as needed. Controller , Disp: , Rfl:     hydrocodone-acetaminophen 5-325mg (NORCO) 5-325 mg per tablet, Take 1 tablet by mouth every 6 (six) hours as needed for Pain., Disp: 30 tablet, Rfl: 0    insulin (BASAGLAR KWIKPEN U-100 INSULIN) glargine 100 units/mL (3mL) SubQ pen, Inject 56 Units into the skin every evening., Disp: 15 Syringe, Rfl: 11    lancets (ONETOUCH DELICA LANCETS) 33 gauge Misc, 1 lancet by Misc.(Non-Drug; Combo Route) route once daily., Disp: 100 each, Rfl: 3    levothyroxine (SYNTHROID) 50 MCG tablet, TAKE 1 TABLET BY MOUTH EVERY DAY, Disp: 30 tablet, Rfl: 11    loratadine (CLARITIN) 10 mg tablet, TAKE 1 TABLET ONCE A DAY AS NEEDED FOR ALLERGIES, Disp: 90 tablet, Rfl: 2    losartan (COZAAR) 25 MG tablet, Take 25 mg by mouth once daily., Disp: , Rfl:     magnesium 30 mg Tab, Take by mouth once., Disp: , Rfl:     meloxicam (MOBIC) 7.5 MG tablet, Take 7.5 mg by mouth as needed. FOR 7 DAYS,  "Disp: , Rfl: 0    multivitamin (ONE DAILY MULTIVITAMIN) per tablet, Take 1 tablet by mouth once daily., Disp: , Rfl:     NOVOLOG U-100 INSULIN ASPART 100 unit/mL injection, INJECT 7 UNITS INTO THE SKIN 3 (THREE) TIMES DAILY BEFORE MEALS., Disp: 20 mL, Rfl: 11    nystatin (MYCOSTATIN) cream, Apply topically 2 (two) times daily., Disp: 30 g, Rfl: 1    ondansetron (ZOFRAN) 4 MG tablet, Take 1 tablet (4 mg total) by mouth every 6 (six) hours. (Patient taking differently: Take 4 mg by mouth as needed. ), Disp: 12 tablet, Rfl: 0    pen needle, diabetic (BD ULTRA-FINE SHORT PEN NEEDLE) 31 gauge x 5/16" Ndle, INJECT 1 DEVICE INTO THE SKIN 3 TIMES DAILY., Disp: 270 each, Rfl: 3    pyridostigmine (MESTINON) 60 mg Tab, TAKE 1 TABLET FOUR TIMES A DAY, Disp: 360 tablet, Rfl: 3    SITagliptin (JANUVIA) 100 MG Tab, Take 1 tablet (100 mg total) by mouth once daily., Disp: 30 tablet, Rfl: 11    spironolactone (ALDACTONE) 25 MG tablet, Take 1 tablet (25 mg total) by mouth once daily. (Patient taking differently: Take 50 mg by mouth once daily. ), Disp: 30 tablet, Rfl: 11    vitamin B comp and C no.3 15-10-50-5-300 mg Cap, 1 po q day, Disp: 90 capsule, Rfl: 3    dapagliflozin (FARXIGA) 5 mg Tab tablet, Take 1 tablet (5 mg total) by mouth once daily., Disp: 30 tablet, Rfl: 11    Current Facility-Administered Medications:     cyanocobalamin injection 1,000 mcg, 1,000 mcg, Intramuscular, Q30 Days, Sherice Gerardo MD, 1,000 mcg at 05/29/20 1008     Review of patient's allergies indicates:   Allergen Reactions    Ramipril      Other reaction(s): caused a severe cough        Objective:   /74   Pulse 80   Ht 5' 3" (1.6 m)   Wt 93.9 kg (207 lb 2 oz)   SpO2 96%   BMI 36.69 kg/m²   BP Readings from Last 3 Encounters:   06/16/20 112/74   06/15/20 121/63   06/12/20 (!) 148/87     Wt Readings from Last 3 Encounters:   06/16/20 0930 93.9 kg (207 lb 2 oz)   06/15/20 0905 93.7 kg (206 lb 9.1 oz)   06/09/20 0859 95 kg (209 lb " 7 oz)          Physical Exam  Vitals signs reviewed.   Constitutional:       Appearance: She is well-developed.   HENT:      Head: Normocephalic and atraumatic.      Right Ear: External ear normal.      Left Ear: External ear normal.   Eyes:      General: No scleral icterus.  Cardiovascular:      Rate and Rhythm: Normal rate and regular rhythm.      Heart sounds: Murmur present.   Pulmonary:      Effort: Pulmonary effort is normal.   Abdominal:      General: There is no distension.      Palpations: Abdomen is soft.      Tenderness: There is no abdominal tenderness.      Comments: Obese, no lipohypertrophy   Musculoskeletal:         General: Swelling present.      Comments: +1 LE swelling bilat. Has sporadic black spots over lower ext.   Lymphadenopathy:      Cervical: No cervical adenopathy.   Neurological:      Mental Status: She is alert and oriented to person, place, and time.      Cranial Nerves: No cranial nerve deficit.   Psychiatric:         Thought Content: Thought content normal.           Lab Results   Component Value Date     06/09/2020    K 4.3 06/09/2020     06/09/2020    CO2 28 06/09/2020    BUN 13 06/09/2020    CREATININE 0.56 06/09/2020     (H) 06/09/2020    HGBA1C 9.0 (H) 06/09/2020    MG 1.8 02/26/2019    AST 43 (H) 06/09/2020    AST 44 (H) 03/05/2016    ALT 25 06/09/2020    ALBUMIN 4.1 06/09/2020    PROT 8.6 (H) 06/09/2020    BILITOT 0.7 06/09/2020    WBC 4.10 06/09/2020    HGB 12.8 06/09/2020    HCT 39.6 06/09/2020    MCV 94 06/09/2020    MCH 30.3 06/09/2020     06/09/2020    MPV 11.8 06/09/2020    GRAN 2.4 06/09/2020    GRAN 59.0 06/09/2020    LYMPH 1.3 06/09/2020    LYMPH 32.7 06/09/2020    CHOL 175 11/01/2019    HDL 44 11/01/2019    LDLCALC 116.4 11/01/2019    TRIG 73 11/01/2019       Lab Results   Component Value Date    TSH 0.846 11/01/2019    FREET4 0.81 03/05/2016        Thyroid Labs Latest Ref Rng & Units 3/13/2020 5/11/2020 6/9/2020   TSH 0.400 - 4.000 uIU/mL -  - -   Free T4 0.78 - 2.19 ng/dL - - -   Sodium 136 - 145 mmol/L 138 137 137   Potassium 3.5 - 5.1 mmol/L 3.8 4.0 4.3   Chloride 95 - 110 mmol/L 104 102 104   Carbon Dioxide 22 - 31 mmol/L 28 28 28   Glucose 70 - 110 mg/dL 126(H) 154(H) 193(H)   Blood Urea Nitrogen 7 - 18 mg/dL 12 11 13   Creatinine 0.50 - 1.40 mg/dL 0.60 0.59 0.56   Calcium 8.4 - 10.2 mg/dL 9.3 9.9 9.7   Total Protein 6.0 - 8.4 g/dL - - 8.6(H)   Albumin 3.5 - 5.2 g/dL - - 4.1   Total Bilirubin 0.2 - 1.3 mg/dL - - 0.7   AST 14 - 36 U/L - - 43(H)   ALT 0 - 35 U/L - - 25   Anion Gap 8 - 16 mmol/L 6(L) 7(L) 5(L)   eGFR (African American) >60 mL/min/1.73 m:2 >60 >60 >60   eGFR (Non-African American) >60 mL/min/1.73 m:2 >60 >60 >60   WBC 3.90 - 12.70 K/uL - - 4.10   RBC 4.00 - 5.40 M/uL - - 4.23   Hemoglobin 12.0 - 16.0 g/dL - - 12.8   Hematocrit 37.0 - 48.5 % - - 39.6   MCV 82 - 98 fL - - 94   MCH 27.0 - 31.0 pg - - 30.3   MCHC 32.0 - 36.0 g/dL - - 32.3   RDW 11.5 - 14.5 % - - 13.2   Platelets 150 - 350 K/uL - - 157   MPV 9.2 - 12.9 fL - - 11.8   Gran # 1.8 - 7.7 K/uL - - 2.4   Lymph # 1.0 - 4.8 K/uL - - 1.3   Mono # 0.3 - 1.0 K/uL - - 0.3   Eos # 0.0 - 0.5 K/uL - - 0.0   Baso # 0.00 - 0.20 K/uL - - 0.02   Gran % 38.0 - 73.0 % - - 59.0   Lymph % 18.0 - 48.0 % - - 32.7   Mono% 4.0 - 15.0 % - - 7.1   Eos % 0.0 - 8.0 % - - 0.5   Baso % 0.0 - 1.9 % - - 0.5   INR - - - -           Hemoglobin A1C   Date Value Ref Range Status   06/09/2020 9.0 (H) 0.0 - 5.6 % Final     Comment:     Reference Interval:  5.0 - 5.6 Normal   5.7 - 6.4 High Risk   > 6.5 Diabetic    Hgb A1c results are standardized based on the (NGSP) National   Glycohemoglobin Standardization Program.    Hemoglobin A1C levels are related to mean serum/plasma glucose   during the preceding 2-3 months.        02/03/2020 10.7 (H) 4.0 - 5.6 % Final     Comment:     ADA Screening Guidelines:  5.7-6.4%  Consistent with prediabetes  >or=6.5%  Consistent with diabetes  High levels of fetal hemoglobin  interfere with the HbA1C  assay. Heterozygous hemoglobin variants (HbS, HgC, etc)do  not significantly interfere with this assay.   However, presence of multiple variants may affect accuracy.     11/01/2019 10.2 (H) 0.0 - 5.6 % Final     Comment:     Reference Interval:  5.0 - 5.6 Normal   5.7 - 6.4 High Risk   > 6.5 Diabetic    Hgb A1c results are standardized based on the (NGSP) National   Glycohemoglobin Standardization Program.    Hemoglobin A1C levels are related to mean serum/plasma glucose   during the preceding 2-3 months.              Assessment and plan:       Problem List Items Addressed This Visit        Cardiac/Vascular    Essential hypertension    Current Assessment & Plan     BP at goal. Continue current meds.         Hyperlipidemia    Current Assessment & Plan     Continue statin. Last FLP Nov 2019- , TG 73. AST slightly increased since prior. Monitor.              Endocrine    Vitamin D deficiency (Chronic)    Current Assessment & Plan     Continue 50,000 units weekly for maintenance. Most recent level wnls.         Hypothyroidism    Current Assessment & Plan     Clinically euthyroid. TSH 0.846 wnls Nov 2019. Continue current dose of levothyroxine.         Hyperparathyroidism    Current Assessment & Plan     Etiology unclear. Suspect PHPT. Most recent calcium wnls. In the past calcium mildly elevated; seemingly, this occurred while on HCTZ. Pt has nl renal function. F/u renal US to check for asymptomatic stones. She does have osteopenia. PTH reduced by half after stopping Lasixs. Continue spironolactone in place of lasixs to tx HTN. Unlike loop diuretics, RAAS inhibitors typically do not increase PTH levels. Check DEXA of forearm. 24 hr urine ca/cr ratio 0.013. Continue to monitor this time.         Diabetes mellitus, type 2 - Primary    Relevant Medications    blood-glucose meter kit    dapagliflozin (FARXIGA) 5 mg Tab tablet       Orthopedic    Osteopenia of neck of femur    Current  Assessment & Plan     Pt with osteopenia. ?if exacerbated by elevated parathyroid hormone. Discussed fall precautions. On vit D replacement. Needs calcium 1200 mg daily (preferrably from food sources). No indication for further intervention at this point.            Other    Class 2 severe obesity due to excess calories with serious comorbidity and body mass index (BMI) of 36.0 to 36.9 in adult    Current Assessment & Plan     BMI 36. Encouraged healthy low carb/calorie diet and for pt to increase physical activity.                 Please schedule previously ordered US of kidney and DEXA of forearm  RTC in 1 month (in person)

## 2020-06-16 NOTE — ASSESSMENT & PLAN NOTE
Pt with osteopenia. ?if exacerbated by elevated parathyroid hormone. Discussed fall precautions. On vit D replacement. Needs calcium 1200 mg daily (preferrably from food sources). No indication for further intervention at this point.

## 2020-06-16 NOTE — ASSESSMENT & PLAN NOTE
Etiology unclear. Suspect PHPT. Most recent calcium wnls. In the past calcium mildly elevated; seemingly, this occurred while on HCTZ. Pt has nl renal function. F/u renal US to check for asymptomatic stones. She does have osteopenia. PTH reduced by half after stopping Lasixs. Continue spironolactone in place of lasixs to tx HTN. Unlike loop diuretics, RAAS inhibitors typically do not increase PTH levels. Check DEXA of forearm. 24 hr urine ca/cr ratio 0.013. Continue to monitor this time.

## 2020-06-16 NOTE — PATIENT INSTRUCTIONS
Change Basaglar to 35 units daily    Increase Novolog to 12 units with meals. If pre-meal blood sugar less than 100, reduce Novolog by 3-6 units.     Continue Januvia.    Start Farxiga.

## 2020-06-16 NOTE — LETTER
June 16, 2020      Sherice Gerardo MD  [address] [place]  [facility] [address]           [facility]  [address] [place] [address]  Phone: [phone]  Fax: [phone]          Patient: Annie Mason   MR Number: [id number]   YOB: 1955   Date of Visit: 6/16/2020       Dear Dr. Sherice Gerardo:    Thank you for referring Annie Mason to me for evaluation. Attached you will find relevant portions of my assessment and plan of care.    If you have questions, please do not hesitate to call me. I look forward to following Annie Mason along with you.    Sincerely,    Juliette L. Sandifer Kum-Nji, MD    Enclosure  CC:  No Recipients    If you would like to receive this communication electronically, please contact [email or web address] or [phone] to request more information on [facility] access.    For providers and/or their staff who would like to refer a patient to [facility], please contact us through our one-stop-shop provider referral line, [facility], at [phone].    If you feel you have received this communication in error or would no longer like to receive these types of communications, please e-mail [email or web address]

## 2020-06-23 ENCOUNTER — HOSPITAL ENCOUNTER (OUTPATIENT)
Dept: RADIOLOGY | Facility: HOSPITAL | Age: 65
Discharge: HOME | End: 2020-06-23
Attending: INTERNAL MEDICINE
Payer: MEDICARE

## 2020-06-23 DIAGNOSIS — E21.3 HYPERPARATHYROIDISM: ICD-10-CM

## 2020-06-23 PROCEDURE — 76770 US EXAM ABDO BACK WALL COMP: CPT | Mod: TC,PO

## 2020-06-23 PROCEDURE — 76770 US EXAM ABDO BACK WALL COMP: CPT | Mod: 26,,, | Performed by: RADIOLOGY

## 2020-06-23 PROCEDURE — 77081 DXA BONE DENSITY APPENDICULR: CPT | Mod: 26,,, | Performed by: RADIOLOGY

## 2020-06-23 PROCEDURE — 76770 US KIDNEY: ICD-10-PCS | Mod: 26,,, | Performed by: RADIOLOGY

## 2020-06-23 PROCEDURE — 77081 DEXA BONE DENSITY APPENDICULAR SKELETON: ICD-10-PCS | Mod: 26,,, | Performed by: RADIOLOGY

## 2020-06-23 PROCEDURE — 77081 DXA BONE DENSITY APPENDICULR: CPT | Mod: TC,PO

## 2020-06-25 ENCOUNTER — TELEPHONE (OUTPATIENT)
Dept: ENDOCRINOLOGY | Facility: CLINIC | Age: 65
End: 2020-06-25

## 2020-06-25 NOTE — TELEPHONE ENCOUNTER
Please inform patient DEXA scan of forearm did not show any significant bone loss.  Also the ultrasound of her kidneys did not show any kidney stones or other abnormalities.  At this point, we will continue to monitor the PTH level.

## 2020-07-07 ENCOUNTER — OFFICE VISIT (OUTPATIENT)
Dept: NEUROLOGY | Facility: CLINIC | Age: 65
End: 2020-07-07
Payer: MEDICARE

## 2020-07-07 VITALS
SYSTOLIC BLOOD PRESSURE: 165 MMHG | HEIGHT: 63 IN | TEMPERATURE: 98 F | WEIGHT: 209.44 LBS | BODY MASS INDEX: 37.11 KG/M2 | DIASTOLIC BLOOD PRESSURE: 72 MMHG | RESPIRATION RATE: 20 BRPM | HEART RATE: 66 BPM

## 2020-07-07 DIAGNOSIS — R53.1 WEAKNESS: ICD-10-CM

## 2020-07-07 DIAGNOSIS — G70.00 MYASTHENIA GRAVIS: Primary | ICD-10-CM

## 2020-07-07 DIAGNOSIS — H53.2 DIPLOPIA: ICD-10-CM

## 2020-07-07 PROCEDURE — 99215 PR OFFICE/OUTPT VISIT, EST, LEVL V, 40-54 MIN: ICD-10-PCS | Mod: S$PBB,,, | Performed by: PSYCHIATRY & NEUROLOGY

## 2020-07-07 PROCEDURE — 99999 PR PBB SHADOW E&M-EST. PATIENT-LVL V: ICD-10-PCS | Mod: PBBFAC,,, | Performed by: PSYCHIATRY & NEUROLOGY

## 2020-07-07 PROCEDURE — 99215 OFFICE O/P EST HI 40 MIN: CPT | Mod: PBBFAC,PN | Performed by: PSYCHIATRY & NEUROLOGY

## 2020-07-07 PROCEDURE — 99215 OFFICE O/P EST HI 40 MIN: CPT | Mod: S$PBB,,, | Performed by: PSYCHIATRY & NEUROLOGY

## 2020-07-07 PROCEDURE — 99999 PR PBB SHADOW E&M-EST. PATIENT-LVL V: CPT | Mod: PBBFAC,,, | Performed by: PSYCHIATRY & NEUROLOGY

## 2020-07-07 RX ORDER — MYCOPHENOLATE MOFETIL 500 MG/1
1000 TABLET ORAL 2 TIMES DAILY
Qty: 120 TABLET | Refills: 11 | Status: SHIPPED | OUTPATIENT
Start: 2020-07-07 | End: 2020-10-06

## 2020-07-07 NOTE — PROGRESS NOTES
"NEUROLOGY  Outpatient CONSULT    [facility]  [address], [place] [address]  [phone] (office) / [phone] (fax)    Patient Name:  Annie Mason  :  1955  MR #:  [id number]  Acct #:  [id number]    Date of Neurology Consult: 2020  Name of Neurologist: Glenda Nava D.O, ABPN, AOBNP, ABEM    Other Physicians:  Sherice Gerardo MD (Primary Care Physician); Dimitri Ireland Jr., MD (Referring)      Chief Complaint: Myasthenia Gravis      History of Present Illness (HPI):  Annie Mason is a 64 y.o. female referred by Dr. Ireland for consultation regarding myasthenia gravis.    She states she was diagnosed around  or .  She was diagnosed by a neurologist in [place].  Her first symptoms involved her eyes, diffuse weakness and changes in speech.  She states she was hospitalized then as well as multiple other times for exacerbation of her myasthenia.  Her antibodies have been negative. She had a thymectomy to try to improve her myasthenia.  She felt it did improve it some in the beginning, but now she feels it was not helpful.      She is currently on IVIG and mestinon. She has been on prednisone and PLEX in the past.  She has never been on non-steroidal immunosuppression.    She is currently getting 5 days of IVIG every 4 weeks.  She feels it is working "alright".  The last week before her IVIG she will start to get weak.  This is a generalized weakness.  She can get hoarse.     She is on her IVIG week now, today is dose #2.    Treatment to date:    IVIG 0.4g/kg x 5 every 4 weeks - current  Mestinon 2-3 times a day.  Prednisone  PLEX      Review of Systems:   General: Weight gain: No, Weight Loss: No, Fatigue: Yes,   Fever: No, Chills: No, Night Sweats: No, Insomnia: No, Excessive sleeping: No   Respiratory:  Cough: No, Shortness of Breath: No,   Wheezing: No, Excessive Snoring: No, Coughing up blood: No  Endocrine: Heat Intolerance: No, Cold Intolerance: " "No,   Excessive Thirst: No, Excessive Hunger: No,   Eyes:  Blurred Vision: No, Double Vision: No,   Light Sensitivity: No, Eye pain: No  Musculoskeletal: Muscle Aches/Pain: Yes, Joint Pain/Swelling: No, Muscle Cramps: No, Muscle Weakness: Yes, Neck Pain: Yes, Back Pain: No   Neurological: Difficulty Walking/Falls: No, Headache Migraine: Yes, Dizziness/Vertigo: No, Fainting: No, Difficulty with Speech: No, Weakness: Yes, Tingling/Numbness: Yes, Tremors: No, Memory Problems: Yes, Seizures: No, Difficulty Swallowing: No, Altered Taste: No.  Cardiovascular: Chest Pain: No, Shortness of Breath: No,   Palpitations: No,  Gastrointestinal: Nausea/Vomiting: Yes, Constipation: No, Diarrhea: No, Bloody Stools: No   Psych/Cog:  Depression: No, Anxiety: No, Hallucinations: No, Problems Concentrating: No  : Frequent Urination: No, Incontinence: No, Blood of Urine: No, Urinary Infections: No, Changes in Sex Drive: No   ENT:Hearing Loss: Yes, Earache: No, Ringing in Ears: No,   Facial Pain: Yes, Chronic Congestion: No   Immune: Seasonal Allergies: No, Hives and/or Rashes: No  The remainder of the review of twelve body systems was reviewed and normal.    Past Medical, Surgical, Family & Social History:   Past Medical History:   Diagnosis Date    Allergy     Anticoagulant long-term use     Brachial plexitis     Depression     Diabetes mellitus, type 2     GERD (gastroesophageal reflux disease)     Hyperlipidemia 2/4/2015    Hypertension     Hypothyroidism     MG (myasthenia gravis)     Mild intermittent asthma with acute exacerbation 12/26/2017    Myasthenia gravis without exacerbation 3/25/2015    Seizures     "years ago"    Stroke     2008     Past Surgical History:   Procedure Laterality Date    CHOLECYSTECTOMY      CORONARY ANGIOGRAPHY N/A 1/28/2019    Procedure: ANGIOGRAM, CORONARY ARTERY;  Surgeon: Ike Jasso III, MD;  [facility];  Service: Cardiology;  Laterality: N/A;    LEFT HEART " "CATHETERIZATION Left 1/28/2019    Procedure: Left heart cath;  Surgeon: Ike Jasso III, MD;  Location: [facility];  Service: Cardiology;  Laterality: Left;    PORTACATH PLACEMENT  12/14/2016    thalmusectomy      TUBAL LIGATION       Family History   Problem Relation Age of Onset    Diabetes Mother     Hypertension Mother     Hypertension Father     Stroke Father     Ovarian cancer Maternal Aunt     Breast cancer Maternal Aunt         age unknown    Myasthenia gravis Paternal Aunt     Diabetes type I Son     Breast cancer Sister 64     Alcohol use:  reports no history of alcohol use.   (Of note, 0.6 oz = 1 beer or 6 oz = 10 beers).  Tobacco use:  reports that she has never smoked. She has never used smokeless tobacco.  Street drug use:  reports no history of drug use.  Allergies: Ramipril.    Home Medications:     Current Outpatient Medications:     albuterol (PROVENTIL) 2.5 mg /3 mL (0.083 %) nebulizer solution, Take 2.5 mg by nebulization every 6 (six) hours as needed for Wheezing. Rescue, Disp: , Rfl:     albuterol sulfate (PROAIR RESPICLICK) 90 mcg/actuation inhaler, Inhale 180 mcg into the lungs every 4 (four) hours. Rescue , Disp: , Rfl:     aspirin (ECOTRIN) 81 MG EC tablet, Take 81 mg by mouth once daily., Disp: , Rfl:     atorvastatin (LIPITOR) 40 MG tablet, Take 1 tablet (40 mg total) by mouth once daily., Disp: 90 tablet, Rfl: 3    BD VEO INSULIN SYRINGE UF 0.3 mL 31 gauge x 15/64" Syrg, USE TO INJECT NOVOLOG 3 TIMES DAILY, Disp: , Rfl: 3    bismuth subsalicylate 262 mg Tab, Take 1 tablet by mouth 2 (two) times a day. for 14 days, Disp: 28 tablet, Rfl: 0    blood pressure monitor Kit, Use to check blood pressure daily, Disp: , Rfl: 0    blood sugar diagnostic (ONETOUCH VERIO) Strp, Check blood sugar 4 times daily.  DX E11.65., Disp: 200 strip, Rfl: 11    blood-glucose meter kit, Please check blood sugar four times daily and as needed. Re: Diabetes E11.65, Disp: 1 each, Rfl: 1    " carvedilol (COREG) 25 MG tablet, TAKE 1 TABLET BY MOUTH TWICE A DAY WITH FOOD, Disp: 180 tablet, Rfl: 3    clarithromycin (BIAXIN) 500 MG tablet, Take 1 tablet (500 mg total) by mouth 2 (two) times daily. for 14 days, Disp: 28 tablet, Rfl: 0    compress.stocking,knee,reg,med Misc, 1 Pair by Misc.(Non-Drug; Combo Route) route once daily. 15-20 mmHg, Disp: 2 each, Rfl: 1    dapagliflozin (FARXIGA) 5 mg Tab tablet, Take 1 tablet (5 mg total) by mouth once daily., Disp: 30 tablet, Rfl: 11    diclofenac sodium (VOLTAREN) 1 % Gel, Apply 2 g topically 3 (three) times daily. To neck for pain, Disp: 100 g, Rfl: 0    DULoxetine (CYMBALTA) 20 MG capsule, Take 20 mg by mouth once daily., Disp: , Rfl:     ergocalciferol (ERGOCALCIFEROL) 50,000 unit Cap, TAKE 1 CAPSULE BY MOUTH ONE TIME PER WEEK, Disp: 4 capsule, Rfl: 1    famotidine (PEPCID) 40 MG tablet, Take 1 tablet (40 mg total) by mouth nightly., Disp: 30 tablet, Rfl: 2    fluocinonide 0.05% (LIDEX) 0.05 % cream, Apply topically 2 (two) times daily., Disp: 1 Tube, Rfl: 3    fluticasone (FLONASE) 50 mcg/actuation nasal spray, 2 sprays each  nostril bid x 1 week then q day prn  nasal congestion or sneezing (Patient taking differently: as needed. 2 sprays each  nostril bid x 1 week then q day prn  nasal congestion or sneezing), Disp: 1 Bottle, Rfl: 3    fluticasone-vilanterol (BREO ELLIPTA) 200-25 mcg/dose DsDv diskus inhaler, Inhale 1 puff into the lungs as needed. Controller , Disp: , Rfl:     hydroCHLOROthiazide (HYDRODIURIL) 25 MG tablet, TAKE 1 TABLET BY MOUTH EVERY DAY IN THE MORNING, Disp: 90 tablet, Rfl: 3    hydrocodone-acetaminophen 5-325mg (NORCO) 5-325 mg per tablet, Take 1 tablet by mouth every 6 (six) hours as needed for Pain., Disp: 30 tablet, Rfl: 0    insulin (BASAGLAR KWIKPEN U-100 INSULIN) glargine 100 units/mL (3mL) SubQ pen, Inject 56 Units into the skin every evening., Disp: 15 Syringe, Rfl: 11    lancets (ONETOUCH DELICA LANCETS) 33 gauge  "Misc, 1 lancet by Misc.(Non-Drug; Combo Route) route once daily., Disp: 100 each, Rfl: 3    levothyroxine (SYNTHROID) 50 MCG tablet, TAKE 1 TABLET BY MOUTH EVERY DAY, Disp: 30 tablet, Rfl: 11    loratadine (CLARITIN) 10 mg tablet, TAKE 1 TABLET ONCE A DAY AS NEEDED FOR ALLERGIES, Disp: 90 tablet, Rfl: 2    magnesium 30 mg Tab, Take by mouth once., Disp: , Rfl:     metroNIDAZOLE (FLAGYL) 500 MG tablet, Take 1 tablet (500 mg total) by mouth 3 (three) times daily. for 14 days, Disp: 42 tablet, Rfl: 0    multivitamin (ONE DAILY MULTIVITAMIN) per tablet, Take 1 tablet by mouth once daily., Disp: , Rfl:     NOVOLOG U-100 INSULIN ASPART 100 unit/mL injection, INJECT 7 UNITS INTO THE SKIN 3 (THREE) TIMES DAILY BEFORE MEALS., Disp: 20 mL, Rfl: 11    nystatin (MYCOSTATIN) cream, Apply topically 2 (two) times daily., Disp: 30 g, Rfl: 1    ondansetron (ZOFRAN-ODT) 8 MG TbDL, Take 1 tablet (8 mg total) by mouth 3 (three) times daily as needed (NAUSEA OR VOMITING)., Disp: 15 tablet, Rfl: 3    pen needle, diabetic (BD ULTRA-FINE SHORT PEN NEEDLE) 31 gauge x 5/16" Ndle, INJECT 1 DEVICE INTO THE SKIN 3 TIMES DAILY., Disp: 270 each, Rfl: 3    potassium chloride SA (K-DUR,KLOR-CON) 20 MEQ tablet, Take 20 mEq by mouth once daily. , Disp: , Rfl:     pyridostigmine (MESTINON) 60 mg Tab, TAKE 1 TABLET FOUR TIMES A DAY, Disp: 360 tablet, Rfl: 3    SITagliptin (JANUVIA) 100 MG Tab, Take 1 tablet (100 mg total) by mouth once daily., Disp: 30 tablet, Rfl: 11    spironolactone (ALDACTONE) 25 MG tablet, Take 1 tablet (25 mg total) by mouth once daily. (Patient taking differently: Take 50 mg by mouth once daily. ), Disp: 30 tablet, Rfl: 11    vitamin B comp and C no.3 15-10-50-5-300 mg Cap, 1 po q day, Disp: 90 capsule, Rfl: 3    meloxicam (MOBIC) 7.5 MG tablet, Take 7.5 mg by mouth as needed. FOR 7 DAYS, Disp: , Rfl: 0    Current Facility-Administered Medications:     [START ON 7/27/2020] cyanocobalamin injection 1,000 mcg, " "1,000 mcg, Intramuscular, Q30 Days, ISABELLA Sosa    Facility-Administered Medications Ordered in Other Visits:     acetaminophen tablet 500 mg, 500 mg, Oral, PRN, Glenda Nava, DO, 500 mg at 07/07/20 0921    diphenhydrAMINE injection 25 mg, 25 mg, Intravenous, PRN, Glenda Nava, DO, 25 mg at 07/07/20 0921    Physical Examination:  BP (!) 165/72 (BP Location: Left arm)   Pulse 66   Temp 97.5 °F (36.4 °C)   Resp 20   Ht 5' 3" (1.6 m)   Wt 95 kg (209 lb 7 oz)   BMI 37.10 kg/m²     GENERAL:  General appearance: Well, non-toxic appearing.  No apparent distress.  Obese female.  Fundi exam: normal.  Neck: supple.  Carotid auscultation: normal.  Heart auscultation: normal.  Peripheral pulses: normal.  Extremities: normal.    MENTAL STATUS:  Alertness, attention span & concentration: normal.  Language: normal.  Orientation to self, place & time:  normal.  Memory, recent & remote: normal.  Fund of knowledge: normal.    SPEECH:  Clear and fluent. Hypophonic  Follows complex commands.    CRANIAL NERVES:  Cranial Nerves II-XII were examined.  II - Visual fields: normal.  III, IV, VI: PERLLA, No ptosis, No nystagmus.  Left eye begins to drift during extraocular testing.  Eyes gravitate medially with upward gaze.  V - Facial sensation: normal.  VII - Face symmetry & mobility: normal.  VIII - Hearing: normal.  IX, X - Palate: mobile & midline.  XI - Shoulder shrug: normal.  XII - Tongue protrusion: normal.    GROSS MOTOR:  Gait & station: normal.  Tone: normal.  Abnormal movements: none.  Coord normal    MUSCLE STRENGTH:     Fascics Atrophy RIGHT    LEFT Atrophy Fascics     4 Deltoids 4       4 Biceps 4       4 Triceps 4       4 Forearm.Pr. 4       4 Inteross. 4       3 Neck flexors 3                5 Iliopsoas 5       5 Quads 5       5 Hams 5       5 Dorsiflex 5       5 Plantar Flex 5         REFLEXES:    RIGHT Reflex   LEFT   1 Biceps 1   1 Brachiorad. 1   1 Triceps 1        1 Patellar 1   0 " Ankle 0        Down PLANTAR Down     SENSORY:  Light touch: Normal throughout.  Sharp touch: Normal throughout.    Myasthenia Gravis Activities of Daily Living Scale     Grade   Function 0 1 2 3   Double Vision None Occasional, not every day Daily, but not constant Constant    x      Eyelid Droop None Occasional, not every day Daily, but not constant Constant     x     Talking Normal Intermittent slurring or nasal speech Constant slurring or nasal speech, but can be understood Speech difficult to understand     x     Chewing Normal Fatigues with solid food Fatigues with soft food Gastric tube     x     Swallowing Normal Chokes rarely Frequent choking requiring change of diet Gastric tube     x     Breathing Normal Shortness of breath on exertion Shortness of breath at rest Ventilator      x    Brushing teeth or hair Normal Requires extra effort but requires no rest period Rest periods needed Cannot do one or more of these functions      x    Ability to rise from chair or toilet Normal Sometimes uses arms Always uses arms Requires assistance      x    Total MG ADL Score 10         Myasthenia Gravis- Quality of Life Score (revised) - MG QoL-15r  Please indicate how true each statement has been (over the past four weeks).   Not at all Somewhat Very Much    0 1 2   1. I am frustrated by my MG     x   2. I have trouble with my eyes because of my MG (e.g. double vision)    x    3. I have trouble eating because of MG    x    4. I have limited my social activity because of my MG     x   5. My MG limits my ability to enjoy hobbies and fun activities     x   6. I have trouble meeting the needs of my family because of my MG    x    7. I have to make plans around my MG     x   8. I am bothered by limitations in performing my work (include work at home) because of my MG   x   9. I have difficulty speaking due to MG    x    10. I have lost some personal independence because of my MG (e.g. driving, shopping, running errands) x      11. I am depressed about my MG   x     12. I have trouble walking due to MG    x    13. I have trouble getting around public places because of my MG    x    14. I feel overwhelmed by my MG    x    15. I have trouble performing my personal grooming needs due to MG    x     Total MG-QoL15r Score 18         Diagnostic Data Reviewed:   MG antibody adult panel was negative in 2016.    She had been [facility] in consultation [facility] and had an EMG.  These records appear to be unavailable at this time.    Assessment and Plan:  Annie Mason is a 64 y.o., right handed woman with antibody negative myasthenia gravis.  She has incomplete resolution of symptoms with frequent IVIG dosing.  She has chronic diplopia, eyelid droop and weakness.  She has poor quality of life on her current regimen.     1.  Get updated antibody testing including MUSK and LRP4.  Will also check [facility] to be sure this is not LEMS (though the ocular symptoms make this much less likely).  2.  Continue IVIG for now.  Will plan to wean once she is stable on non-steroidal immunosuppression.  3.  We discussion non-steroidal immunosuppression at length.  She understands the purpose is to control the symptoms of MG by modifying the immune system.  We reviewed her options an have decided to try Cellcept at this time. She will start 500mg BID and gradually increase as tolerated.  This is also based on blood work.  She will have a CBC and CMP monthly.        The patient will return to clinic in 3 months.    Important to note, also  has a past medical history of Allergy, Anticoagulant long-term use, Brachial plexitis, Depression, Diabetes mellitus, type 2, GERD (gastroesophageal reflux disease), Hyperlipidemia (2/4/2015), Hypertension, Hypothyroidism, MG (myasthenia gravis), Mild intermittent asthma with acute exacerbation (12/26/2017), Myasthenia gravis without exacerbation (3/25/2015), Seizures, and Stroke.           Glenda aNva D.O, ABPN, AOBNP,  ABEM    This note was created with voice recognition software.  Grammatical, syntax and spelling errors may be inevitable.

## 2020-07-07 NOTE — LETTER
July 13, 2020      Dimitri Ireland Jr., MD  [address] [place] [address]           [facility]  [address] [place] [address]  Phone: [phone]  Fax: [phone]          Patient: Annie Mason   MR Number: [id number]   YOB: 1955   Date of Visit: 7/7/2020       Dear Dr. Dimitri Ireland Jr.:    Thank you for referring Annie Mason to me for evaluation. Attached you will find relevant portions of my assessment and plan of care.    If you have questions, please do not hesitate to call me. I look forward to following Annie Mason along with you.    Sincerely,    Glenda Nava, DO    Enclosure  CC:  No Recipients    If you would like to receive this communication electronically, please contact [email or web address] or [phone] to request more information on [facility] Link access.    For providers and/or their staff who would like to refer a patient to [facility], please contact us through our one-stop-shop provider referral line, [facility], at [phone].    If you feel you have received this communication in error or would no longer like to receive these types of communications, please e-mail [email or web address]

## 2020-07-07 NOTE — PATIENT INSTRUCTIONS
New medication to get better control of Myasthenia:  Cellcept (mycophenolate) 500mg.  Start 1 pill twice a day.  You will have your blood work drawn monthly.  Depending on how your blood work looks, Dr. Nava will then advise you to increase to 2 pills twice a day.  Continue monthly blood work until instructed otherwise.    Continue Mestinon as needed.  Continue IVIG with your current monthly protocol.  These will be changed down the road.    Will plan to get updated bloodwork to look for other antibodies that can be associated with myasthenia symptoms.

## 2020-07-08 ENCOUNTER — TELEPHONE (OUTPATIENT)
Dept: PHARMACY | Facility: CLINIC | Age: 65
End: 2020-07-08

## 2020-07-10 ENCOUNTER — TELEPHONE (OUTPATIENT)
Dept: PHARMACY | Facility: CLINIC | Age: 65
End: 2020-07-10

## 2020-07-10 ENCOUNTER — TELEPHONE (OUTPATIENT)
Dept: SURGERY | Facility: CLINIC | Age: 65
End: 2020-07-10

## 2020-07-10 NOTE — TELEPHONE ENCOUNTER
Initial Mycophenolate 500mg consult completed on Friday 7/10/2020. Mycophenolate 500mg will be shipped on  via [employer] to arrive at patient's home on . $1.30 Copay. Patient intends to start Mycophenolate once completed course of metronidazole.  Confirmed 2 patient identifiers - name and . Therapy Appropriate.     Indication: Myasthenia gravis [G70.00]     Patient was counseled on the administration directions for Mycophenolate 500mg:  -Take 2 tablets (1000 mg) by mouth twice daily - patient instructed by provider to take 1 tablet BID until cleared to increase   -Should be taken on an empty stomach (1 hour before or 2 hours after meals) but can be taken with food when necessary to decrease gastrointestinal side effects    -Do not chew, crush, or break the tablets.  Due to teratogenic nature of the medication, all patients, especially those of childbearing age, may handle the medication with their hands only if they wear latex or nitrile glove and wash their hands before and after handling the tablets.      Patient was counseled on the following possible side effects, which include, but are not limited to:   · GI: Abdominal pain (25% to 63%), nausea (20% to 55%), diarrhea (31% to 51%), constipation (19% to 41%), vomiting (33% to 34%), anorexia (25%), dyspepsia (22%)  · Hematologic & oncologic: Leukopenia (23% to 46%), anemia (26% to 43%), leukocytosis (22% to 41%), thrombocytopenia (24% to 38%), hypochromic anemia (25%) increased risk of malignancies (particularly of skin)  · Cardiovascular: Hyper/hypotension, edema, chest pain (26%), tachycardia (20% to 22%)  · Central nervous system: Pain (31% to 76%), headache (16% to 54%), insomnia (41% to 52%), dizziness (29%), anxiety (28%), paresthesia (21%)  · Dermatologic: Skin rash (22%)  · Endocrine & metabolic: Hyperglycemia (44% to 47%), hypercholesterolemia (41%), hypomagnesemia (39%), hypokalemia (32% to 37%), hypocalcemia (30%), increased  lactate dehydrogenase (23%), hyperkalemia (22%)  · Genitourinary: Urinary tract infection (37%)  · Hepatic: Abnormal hepatic function tests (25%), ascites (24%)  · Infection: Sepsis (27%), infection (18% to 27%), candidiasis (17% to 22%), herpes simplex infection (10% to 21%)  · Neuromuscular & skeletal: Back pain (35% to 47%), weakness (35% to 43%), tremor (24% to 34%)  · Renal: Increased serum creatinine (39%), increased blood urea nitrogen (35%), renal function abnormality (22% to 26%)  ·   · Respiratory: Dyspnea (31% to 37%), respiratory tract infection (22% to 37%), pleural effusion (34%), cough (31%), pulmonary disease (22% to 30%), sinusitis (26%)  · Miscellaneous: Fever (21% to 52%)    Monitoring: CBC: Recommended Weekly during first month, twice monthly for second and third months, then monthly through the first year     DDIs:  Medication list reviewed. DDI noted with metronidazole and mycophenolate. Patient intends to complete course of metronidazole before starting mycophenolate.     Patient informed of [facility] hours, refill procedures, and on-call pharmacist 24 hours a day. Patient verbalized understanding.     Consultation included: indication; goals of treatment; administration; storage and handling; side effects; how to handle side effects; the importance of compliance; how to handle missed doses; the importance of laboratory monitoring; the importance of keeping all follow up appointments. Mycophenolate REMS Medication Guide reviewed. Patient understands to report any medication changes to [facility] and provider.  All questions answered and addressed to patients satisfaction. [facility] to contact patient in 3 weeks for refills.     Abe Ch, PharmD  Clinical Pharmacist  [facility]  P: [phone]

## 2020-07-10 NOTE — TELEPHONE ENCOUNTER
----- Message from Jaylin Govea sent at 7/10/2020  8:52 AM CDT -----  Contact: pt  Pt states that she needs to see the Dr as soon as  possible for port check/not working right infusion says...[phone] (home)

## 2020-07-16 ENCOUNTER — TELEPHONE (OUTPATIENT)
Dept: NEUROLOGY | Facility: CLINIC | Age: 65
End: 2020-07-16

## 2020-07-16 NOTE — TELEPHONE ENCOUNTER
----- Message from Marilyn Martinez sent at 7/16/2020  9:09 AM CDT -----  Regarding: medication  Contact: Annie  Type: Needs Medical Advice  Who Called:  Annie  Symptoms (please be specific):  possible side affects of a med  How long has patient had these symptoms:  n/a  Pharmacy name and phone #:    [id number] - [place] - [address] [place], [facility]  [address] [place] [id number]  Phone: [phone] Fax: [phone]    Best Call Back Number: [phone]  Additional Information: Pls call pt regarding her med/ mycophenolate (CELLCEPT) 500 mg Tab      
I absolutely understand her concern.  I am not using this medication to suppress her ability to fight infection. Only enough to suppress the part of her immune system that is attacking her muscles.  This is how we manage myasthenia gravis.  It is the standard of care.  If she would like to wait, that is certainly her choice, but I think she will be much happier if she starts now.  It takes 4-6 months for the medication to get into the system and become effective.    
Pt is reading the side effects of the cellcept. She is scared of being immunosuppressed.   
Returned call to pt. No answer and voicemail not set up.   
normal

## 2020-07-17 ENCOUNTER — TELEPHONE (OUTPATIENT)
Dept: NEUROLOGY | Facility: CLINIC | Age: 65
End: 2020-07-17

## 2020-07-17 ENCOUNTER — OFFICE VISIT (OUTPATIENT)
Dept: ENDOCRINOLOGY | Facility: CLINIC | Age: 65
End: 2020-07-17
Payer: MEDICARE

## 2020-07-17 VITALS
BODY MASS INDEX: 37.02 KG/M2 | RESPIRATION RATE: 20 BRPM | DIASTOLIC BLOOD PRESSURE: 85 MMHG | WEIGHT: 209 LBS | SYSTOLIC BLOOD PRESSURE: 140 MMHG

## 2020-07-17 DIAGNOSIS — M85.859 OSTEOPENIA OF NECK OF FEMUR, UNSPECIFIED LATERALITY: ICD-10-CM

## 2020-07-17 DIAGNOSIS — E03.4 HYPOTHYROIDISM DUE TO ACQUIRED ATROPHY OF THYROID: ICD-10-CM

## 2020-07-17 DIAGNOSIS — E66.01 CLASS 2 SEVERE OBESITY DUE TO EXCESS CALORIES WITH SERIOUS COMORBIDITY AND BODY MASS INDEX (BMI) OF 37.0 TO 37.9 IN ADULT: ICD-10-CM

## 2020-07-17 DIAGNOSIS — Z79.4 TYPE 2 DIABETES MELLITUS WITH OTHER NEUROLOGIC COMPLICATION, WITH LONG-TERM CURRENT USE OF INSULIN: Primary | ICD-10-CM

## 2020-07-17 DIAGNOSIS — E11.49 TYPE 2 DIABETES MELLITUS WITH OTHER NEUROLOGIC COMPLICATION, WITH LONG-TERM CURRENT USE OF INSULIN: Primary | ICD-10-CM

## 2020-07-17 DIAGNOSIS — I10 ESSENTIAL HYPERTENSION: ICD-10-CM

## 2020-07-17 DIAGNOSIS — E21.3 HYPERPARATHYROIDISM: ICD-10-CM

## 2020-07-17 DIAGNOSIS — E78.2 MIXED HYPERLIPIDEMIA: ICD-10-CM

## 2020-07-17 PROCEDURE — 99214 PR OFFICE/OUTPT VISIT, EST, LEVL IV, 30-39 MIN: ICD-10-PCS | Mod: S$PBB,,, | Performed by: INTERNAL MEDICINE

## 2020-07-17 PROCEDURE — 99999 PR PBB SHADOW E&M-EST. PATIENT-LVL V: ICD-10-PCS | Mod: PBBFAC,,, | Performed by: INTERNAL MEDICINE

## 2020-07-17 PROCEDURE — 99999 PR PBB SHADOW E&M-EST. PATIENT-LVL V: CPT | Mod: PBBFAC,,, | Performed by: INTERNAL MEDICINE

## 2020-07-17 PROCEDURE — 99215 OFFICE O/P EST HI 40 MIN: CPT | Mod: PBBFAC,PO | Performed by: INTERNAL MEDICINE

## 2020-07-17 PROCEDURE — 99214 OFFICE O/P EST MOD 30 MIN: CPT | Mod: S$PBB,,, | Performed by: INTERNAL MEDICINE

## 2020-07-17 NOTE — ASSESSMENT & PLAN NOTE
Etiology unclear. Suspect secondary to lasix use vs PHPT. Most recent calcium wnls. In the past calcium mildly elevated; seemingly, this occurred while on HCTZ. Pt has nl renal function. Renal US with no stones. She does have osteopenia. PTH reduced by half after stopping Lasixs and is now barely elevated at 81. Continue spironolactone in place of lasixs to tx HTN. Unlike loop diuretics, RAAS inhibitors typically do not increase PTH levels.DEXA of forearm wnls. 24 hr urine ca/cr ratio 0.013. Most recent vitamin D adequate at 41. Continue to monitor this time.

## 2020-07-17 NOTE — TELEPHONE ENCOUNTER
Attempt made to call Pt and Israel Peoples three times. No voicemail picks up on either phone. Will attempt to call at a later time.

## 2020-07-17 NOTE — ASSESSMENT & PLAN NOTE
BMI 37. Encouraged healthy low carb/calorie diet and for pt to increase physical activity. Starting GLP-1 which should help with weight loss. Pt concerned about losing to much weight.

## 2020-07-17 NOTE — PATIENT INSTRUCTIONS
Start bydureon once weekly. Stop Januvia once starting bydureon.     Send blood sugars in one week.

## 2020-07-17 NOTE — TELEPHONE ENCOUNTER
----- Message from Glenda Nava DO sent at 7/16/2020 11:50 PM CDT -----  The lab results are normal so far.  Please update the patient.

## 2020-07-17 NOTE — PROGRESS NOTES
Subjective:    Patient ID:  Annie aMson is a 64 y.o. female.    Chief Complaint:  Follow-up and Diabetes      Pt presents to follow up for type 2 diabetes.    PMH also significant for myasthenia gravis. Notes neuro wants to start her on a new drug that can suppress her immune system.     Overview: Onset of Type 2 diabetes mellitus was < 10 years ago. Has history of CVA but no other macrovascular complications. No microvascular complications. No history of DKA.   Cardiovascular risk factors: advanced age (older than 55 for men, 65 for women), diabetes mellitus, dyslipidemia, hypertension and sedentary lifestyle. In the past, she was not checking blood sugar due to giving her meter to her son. Last eye exam was about 1 year ago.  No hx of DR per pt. No history of pancreatitis or MEN syndrome.       History of hypothyroidism and myasthenia gravis. Takes levothyroxine. No issues swallowing or voice changes.     Prior to her last visit, she went to PCP for leg swelling. Was dx'ed with cellulitis. Tx'ed with abx. Had been taking lasix for about one year. PCP switched to spironolactone. Notes some increase in fluid rentention in legs.      Currently taking:  Novolog 12 units with meals. If pre-meal blood sugar less than 100, reduce Novolog by 3-6 units.   Basaglar 35 units at night  Januvia 100. No hx of pancreatitis or MEN syndrome.   Did not resume metformin as suggested at last visit. Read an article that metformin can cause spots on her legs.    Started Farxiga and took about 1 week. Had dizziness and polyuria so she stopped.    Has not been taking blood sugars prior to meals and bedtime. Often takes 1 hour after eating due to forgetting.    Home blood sugar records:   Fasting 130-185  Postlunch 162-178  Post-dinner 234  Bedtime -    Most recent HbA1C was 8.5 July 2020, was 9 in June 2020 and previously 10.6. Feb 2020.  Saw diabetic educator in the past. Plans to keep working on portion size. Has reduce  pasta/rice/and carbs. Eats more vegetables. Denies snacking between meals. Typically eats 3 meals a day. Notes often gets hungry around 9 pm. Snacks on tuna fish with crackers in during this time.      Does not exercise regularly.   Last eye exam >1 year ago. Plans to make appt soon.  Any episodes of hypoglycemia? No. Lowest was 79. Felt jittery.        In regards to HPT, denies history of fractures. Had kidney stone once several years ago. Is taking ergocalciferol once weekly. No recent falls. Has been taking lasix for about one year but now on Spironolactone. Renal US  6/30 with no stones. Dexa L forearm 6/30 wnls.  Had osteopenia of femoral neck 3/2020.         Review of Systems   Constitutional: Negative for fatigue and unexpected weight change.   Eyes: Negative for visual disturbance.   Respiratory: Negative for shortness of breath.    Cardiovascular: Negative for chest pain and leg swelling.   Gastrointestinal: Negative for abdominal pain.   Endocrine: Positive for polyuria. Negative for polydipsia and polyphagia.   Musculoskeletal: Negative for myalgias.   Skin: Positive for rash. Negative for wound.        Dark brown spots over anterior legs   Neurological: Negative for numbness and headaches.   Psychiatric/Behavioral: Negative for sleep disturbance.            Diabetes Management Status    Statin: Taking  ACE/ARB: Not taking    Screening or Prevention Patient's value Goal Complete/Controlled?   HgA1C Testing and Control   Lab Results   Component Value Date    HGBA1C 8.5 (H) 07/15/2020      Annually/Less than 8% No   Lipid profile : 11/01/2019 Annually Yes   LDL control Lab Results   Component Value Date    LDLCALC 116.4 11/01/2019    Annually/Less than 100 mg/dl  No   Nephropathy screening Lab Results   Component Value Date    LABMICR <6.0 07/18/2019     Lab Results   Component Value Date    PROTEINUA Negative 07/19/2019    Annually Yes   Blood pressure BP Readings from Last 1 Encounters:   07/17/20 (!)  "140/85    Less than 140/90 Yes   Dilated retinal exam : 01/15/2018 Annually Yes   Foot exam   : 01/30/2020 Annually Yes        Past Medical History:   Diagnosis Date    Allergy     Anticoagulant long-term use     Brachial plexitis     Depression     Diabetes mellitus, type 2     GERD (gastroesophageal reflux disease)     Hyperlipidemia 2/4/2015    Hypertension     Hypothyroidism     MG (myasthenia gravis)     Mild intermittent asthma with acute exacerbation 12/26/2017    Myasthenia gravis without exacerbation 3/25/2015    Seizures     "years ago"    Stroke     2008      Social History     Tobacco Use    Smoking status: Never Smoker    Smokeless tobacco: Never Used   Substance Use Topics    Alcohol use: No    Drug use: No     Family History   Problem Relation Age of Onset    Diabetes Mother     Hypertension Mother     Hypertension Father     Stroke Father     Ovarian cancer Maternal Aunt     Breast cancer Maternal Aunt         age unknown    Myasthenia gravis Paternal Aunt     Diabetes type I Son     Breast cancer Sister 64      Past Surgical History:   Procedure Laterality Date    CHOLECYSTECTOMY      CORONARY ANGIOGRAPHY N/A 1/28/2019    Procedure: ANGIOGRAM, CORONARY ARTERY;  Surgeon: Ike Jasso III, MD;  Location: [facility] CATH;  Service: Cardiology;  Laterality: N/A;    LEFT HEART CATHETERIZATION Left 1/28/2019    Procedure: Left heart cath;  Surgeon: Ike Jasso III, MD;  Location: [facility] CATH;  Service: Cardiology;  Laterality: Left;    PORTACATH PLACEMENT  12/14/2016    thalmusectomy      TUBAL LIGATION            Current Outpatient Medications:     albuterol (PROVENTIL) 2.5 mg /3 mL (0.083 %) nebulizer solution, Take 2.5 mg by nebulization every 6 (six) hours as needed for Wheezing. Rescue, Disp: , Rfl:     albuterol sulfate (PROAIR RESPICLICK) 90 mcg/actuation inhaler, Inhale 180 mcg into the lungs every 4 (four) hours. Rescue , Disp: , Rfl:     aspirin (ECOTRIN) 81 MG " "EC tablet, Take 81 mg by mouth once daily., Disp: , Rfl:     atorvastatin (LIPITOR) 40 MG tablet, Take 1 tablet (40 mg total) by mouth once daily., Disp: 90 tablet, Rfl: 3    BD VEO INSULIN SYRINGE UF 0.3 mL 31 gauge x 15/64" Syrg, USE TO INJECT NOVOLOG 3 TIMES DAILY, Disp: , Rfl: 3    blood pressure monitor Kit, Use to check blood pressure daily, Disp: , Rfl: 0    blood sugar diagnostic (ONETOUCH VERIO) Strp, Check blood sugar 4 times daily.  DX E11.65., Disp: 200 strip, Rfl: 11    blood-glucose meter kit, Please check blood sugar four times daily and as needed. Re: Diabetes E11.65, Disp: 1 each, Rfl: 1    carvedilol (COREG) 25 MG tablet, TAKE 1 TABLET BY MOUTH TWICE A DAY WITH FOOD, Disp: 180 tablet, Rfl: 3    compress.stocking,knee,reg,med Misc, 1 Pair by Misc.(Non-Drug; Combo Route) route once daily. 15-20 mmHg, Disp: 2 each, Rfl: 1    dapagliflozin (FARXIGA) 5 mg Tab tablet, Take 1 tablet (5 mg total) by mouth once daily., Disp: 30 tablet, Rfl: 11    diclofenac sodium (VOLTAREN) 1 % Gel, Apply 2 g topically 3 (three) times daily. To neck for pain, Disp: 100 g, Rfl: 0    DULoxetine (CYMBALTA) 20 MG capsule, Take 20 mg by mouth once daily., Disp: , Rfl:     ergocalciferol (ERGOCALCIFEROL) 50,000 unit Cap, TAKE 1 CAPSULE BY MOUTH ONE TIME PER WEEK, Disp: 4 capsule, Rfl: 1    famotidine (PEPCID) 40 MG tablet, Take 1 tablet (40 mg total) by mouth nightly., Disp: 30 tablet, Rfl: 2    fluocinonide 0.05% (LIDEX) 0.05 % cream, Apply topically 2 (two) times daily., Disp: 1 Tube, Rfl: 3    fluticasone (FLONASE) 50 mcg/actuation nasal spray, 2 sprays each  nostril bid x 1 week then q day prn  nasal congestion or sneezing (Patient taking differently: as needed. 2 sprays each  nostril bid x 1 week then q day prn  nasal congestion or sneezing), Disp: 1 Bottle, Rfl: 3    fluticasone-vilanterol (BREO ELLIPTA) 200-25 mcg/dose DsDv diskus inhaler, Inhale 1 puff into the lungs as needed. Controller , Disp: , Rfl: " "    hydroCHLOROthiazide (HYDRODIURIL) 25 MG tablet, TAKE 1 TABLET BY MOUTH EVERY DAY IN THE MORNING, Disp: 90 tablet, Rfl: 3    hydrocodone-acetaminophen 5-325mg (NORCO) 5-325 mg per tablet, Take 1 tablet by mouth every 6 (six) hours as needed for Pain., Disp: 30 tablet, Rfl: 0    insulin (BASAGLAR KWIKPEN U-100 INSULIN) glargine 100 units/mL (3mL) SubQ pen, Inject 56 Units into the skin every evening., Disp: 15 Syringe, Rfl: 11    lancets (ONETOUCH DELICA LANCETS) 33 gauge Misc, 1 lancet by Misc.(Non-Drug; Combo Route) route once daily., Disp: 100 each, Rfl: 3    levothyroxine (SYNTHROID) 50 MCG tablet, TAKE 1 TABLET BY MOUTH EVERY DAY, Disp: 30 tablet, Rfl: 11    loratadine (CLARITIN) 10 mg tablet, TAKE 1 TABLET ONCE A DAY AS NEEDED FOR ALLERGIES, Disp: 90 tablet, Rfl: 2    magnesium 30 mg Tab, Take by mouth once., Disp: , Rfl:     meloxicam (MOBIC) 7.5 MG tablet, Take 7.5 mg by mouth as needed. FOR 7 DAYS, Disp: , Rfl: 0    multivitamin (ONE DAILY MULTIVITAMIN) per tablet, Take 1 tablet by mouth once daily., Disp: , Rfl:     mycophenolate (CELLCEPT) 500 mg Tab, Take 2 tablets (1,000 mg total) by mouth 2 (two) times daily., Disp: 120 tablet, Rfl: 11    NOVOLOG U-100 INSULIN ASPART 100 unit/mL injection, INJECT 7 UNITS INTO THE SKIN 3 (THREE) TIMES DAILY BEFORE MEALS., Disp: 20 mL, Rfl: 11    nystatin (MYCOSTATIN) cream, Apply topically 2 (two) times daily., Disp: 30 g, Rfl: 1    ondansetron (ZOFRAN-ODT) 8 MG TbDL, Take 1 tablet (8 mg total) by mouth 3 (three) times daily as needed (NAUSEA OR VOMITING)., Disp: 15 tablet, Rfl: 3    pen needle, diabetic (BD ULTRA-FINE SHORT PEN NEEDLE) 31 gauge x 5/16" Ndle, INJECT 1 DEVICE INTO THE SKIN 3 TIMES DAILY., Disp: 270 each, Rfl: 3    potassium chloride SA (K-DUR,KLOR-CON) 20 MEQ tablet, Take 20 mEq by mouth once daily. , Disp: , Rfl:     pyridostigmine (MESTINON) 60 mg Tab, TAKE 1 TABLET FOUR TIMES A DAY, Disp: 360 tablet, Rfl: 3    spironolactone " (ALDACTONE) 25 MG tablet, Take 1 tablet (25 mg total) by mouth once daily. (Patient taking differently: Take 50 mg by mouth once daily. ), Disp: 30 tablet, Rfl: 11    vitamin B comp and C no.3 15-10-50-5-300 mg Cap, 1 po q day, Disp: 90 capsule, Rfl: 3    exenatide microspheres (BYDUREON) 2 mg ER injection suspension, Inject 2 mg into the skin every 7 days., Disp: 1 vial, Rfl: 2    Current Facility-Administered Medications:     [START ON 7/27/2020] cyanocobalamin injection 1,000 mcg, 1,000 mcg, Intramuscular, Q30 Days, Eriberto Eden, APRN, 1,000 mcg at 07/09/20 1413     Review of patient's allergies indicates:   Allergen Reactions    Ramipril      Other reaction(s): caused a severe cough        Objective:   BP (!) 140/85   Resp 20   Wt 94.8 kg (209 lb)   BMI 37.02 kg/m²   BP Readings from Last 3 Encounters:   07/17/20 (!) 140/85   07/10/20 (!) 136/52   07/09/20 (!) 112/59     Wt Readings from Last 3 Encounters:   07/17/20 0919 94.8 kg (209 lb)   07/09/20 0852 95 kg (209 lb 7 oz)   07/08/20 1215 95 kg (209 lb 7 oz)          Physical Exam  Constitutional:       Appearance: Normal appearance. She is well-developed.   HENT:      Head: Normocephalic and atraumatic.      Right Ear: External ear normal.      Left Ear: External ear normal.   Eyes:      General: No scleral icterus.  Neck:      Comments: +ve acanthosis. No thyromegaly or palpable thyroid nodule  Cardiovascular:      Rate and Rhythm: Normal rate and regular rhythm.      Heart sounds: No murmur.   Pulmonary:      Effort: Pulmonary effort is normal. No respiratory distress.      Breath sounds: No wheezing or rales.   Abdominal:      General: There is no distension.      Palpations: Abdomen is soft.      Tenderness: There is no abdominal tenderness.      Comments: No lipohypertrophy. Obese abd   Musculoskeletal:         General: No swelling.   Lymphadenopathy:      Cervical: No cervical adenopathy.   Skin:     General: Skin is warm.   Neurological:       Mental Status: She is alert and oriented to person, place, and time. Mental status is at baseline.      Deep Tendon Reflexes: Reflexes normal.   Psychiatric:         Thought Content: Thought content normal.           Lab Results   Component Value Date     07/08/2020    K 4.3 07/08/2020     07/08/2020    CO2 27 07/08/2020    BUN 12 07/08/2020    CREATININE 0.62 07/08/2020     (H) 07/08/2020    HGBA1C 8.5 (H) 07/15/2020    MG 1.8 02/26/2019    AST 79 (H) 07/08/2020    AST 44 (H) 03/05/2016    ALT 53 (H) 07/08/2020    ALBUMIN 4.4 07/08/2020    PROT 9.4 (H) 07/08/2020    BILITOT 0.3 07/08/2020    WBC 4.73 07/08/2020    HGB 13.7 07/08/2020    HCT 42.0 07/08/2020    MCV 94 07/08/2020    MCH 30.5 07/08/2020     07/08/2020    MPV 11.3 07/08/2020    GRAN 3.0 07/08/2020    GRAN 62.6 07/08/2020    LYMPH 1.3 07/08/2020    LYMPH 27.5 07/08/2020    CHOL 175 11/01/2019    HDL 44 11/01/2019    LDLCALC 116.4 11/01/2019    TRIG 73 11/01/2019       Lab Results   Component Value Date    TSH 0.846 11/01/2019    FREET4 0.81 03/05/2016        Thyroid Labs Latest Ref Rng & Units 6/30/2020 7/8/2020 7/15/2020   TSH 0.400 - 4.000 uIU/mL - - -   Free T4 0.78 - 2.19 ng/dL - - -   Sodium 136 - 145 mmol/L - 137 -   Potassium 3.5 - 5.1 mmol/L - 4.3 -   Chloride 95 - 110 mmol/L - 101 -   Carbon Dioxide 22 - 31 mmol/L - 27 -   Glucose 70 - 110 mg/dL - 224(H) -   Blood Urea Nitrogen 7 - 18 mg/dL - 12 -   Creatinine 0.50 - 1.40 mg/dL - 0.62 -   Calcium 8.4 - 10.2 mg/dL - 10.2 9.9   Total Protein 6.0 - 8.4 g/dL 7.8 9.4(H) -   Albumin 3.5 - 5.2 g/dL 4.0 4.4 -   Total Bilirubin 0.2 - 1.3 mg/dL 0.4 0.3 -   AST 14 - 36 U/L 41(H) 79(H) -   ALT 0 - 35 U/L 24 53(H) -   Anion Gap 8 - 16 mmol/L - 9 -   eGFR (African American) >60 mL/min/1.73 m:2 - >60 -   eGFR (Non-African American) >60 mL/min/1.73 m:2 - >60 -   WBC 3.90 - 12.70 K/uL - 4.73 -   RBC 4.00 - 5.40 M/uL - 4.49 -   Hemoglobin 12.0 - 16.0 g/dL - 13.7 -   Hematocrit 37.0 -  48.5 % - 42.0 -   MCV 82 - 98 fL - 94 -   MCH 27.0 - 31.0 pg - 30.5 -   MCHC 32.0 - 36.0 g/dL - 32.6 -   RDW 11.5 - 14.5 % - 13.6 -   Platelets 150 - 350 K/uL - 188 -   MPV 9.2 - 12.9 fL - 11.3 -   Gran # 1.8 - 7.7 K/uL - 3.0 -   Lymph # 1.0 - 4.8 K/uL - 1.3 -   Mono # 0.3 - 1.0 K/uL - 0.4 -   Eos # 0.0 - 0.5 K/uL - 0.1 -   Baso # 0.00 - 0.20 K/uL - 0.04 -   Gran % 38.0 - 73.0 % - 62.6 -   Lymph % 18.0 - 48.0 % - 27.5 -   Mono% 4.0 - 15.0 % - 7.6 -   Eos % 0.0 - 8.0 % - 1.1 -   Baso % 0.0 - 1.9 % - 0.8 -   INR - - - -           Hemoglobin A1C   Date Value Ref Range Status   07/15/2020 8.5 (H) 0.0 - 5.6 % Final     Comment:     Reference Interval:  5.0 - 5.6 Normal   5.7 - 6.4 High Risk   > 6.5 Diabetic    Hgb A1c results are standardized based on the (NGSP) National   Glycohemoglobin Standardization Program.    Hemoglobin A1C levels are related to mean serum/plasma glucose   during the preceding 2-3 months.        06/09/2020 9.0 (H) 0.0 - 5.6 % Final     Comment:     Reference Interval:  5.0 - 5.6 Normal   5.7 - 6.4 High Risk   > 6.5 Diabetic    Hgb A1c results are standardized based on the (NGSP) National   Glycohemoglobin Standardization Program.    Hemoglobin A1C levels are related to mean serum/plasma glucose   during the preceding 2-3 months.        02/03/2020 10.7 (H) 4.0 - 5.6 % Final     Comment:     ADA Screening Guidelines:  5.7-6.4%  Consistent with prediabetes  >or=6.5%  Consistent with diabetes  High levels of fetal hemoglobin interfere with the HbA1C  assay. Heterozygous hemoglobin variants (HbS, HgC, etc)do  not significantly interfere with this assay.   However, presence of multiple variants may affect accuracy.           Assessment and plan:       Problem List Items Addressed This Visit        Cardiac/Vascular    Essential hypertension    Current Assessment & Plan     BP slightly above goal. Does not check at home. Was rushing into clinic. Continue current meds. Advised pt to get a cuff an monitor.   Notify MD if blood pressure more than 140/90.         Hyperlipidemia    Current Assessment & Plan     Continue statin. Last FLP Nov 2019- , TG 73. Most recent CMP AST/ALT both trending up. F/u with PCP for further work up.             Endocrine    Hypothyroidism    Current Assessment & Plan     Clinically euthyroid. TSH 0.846 wnls Nov 2019.         Hyperparathyroidism    Current Assessment & Plan     Etiology unclear. Suspect secondary to lasix use vs PHPT. Most recent calcium wnls. In the past calcium mildly elevated; seemingly, this occurred while on HCTZ. Pt has nl renal function. Renal US with no stones. She does have osteopenia. PTH reduced by half after stopping Lasixs and is now barely elevated at 81. Continue spironolactone in place of lasixs to tx HTN. Unlike loop diuretics, RAAS inhibitors typically do not increase PTH levels.DEXA of forearm wnls. 24 hr urine ca/cr ratio 0.013. Most recent vitamin D adequate at 41. Continue to monitor this time.         Diabetes mellitus, type 2 - Primary    Pt with macrovascular complication of CVA.  BG not at goal and pt has not been checking BG recently.   HbA1C improved to 8.5%. Goal is 7% if this can be obtained without hypoglycemia.  Continu current insulin doses.   Start Bydureon then stop Januvia.    Did not tolerate SGLT-2i.   Resume metformin.  Check BG before meals and bedtime. Bring log to next visit.   Call MD in 1 week with blood glucose log to adjust insulin doses after properly checking BG.  Last eye exam <1 yr ago and no DPR per pt.  Last urine micro wnls June 2019. Rehceck now.   Counseled pt on low carb/low fat diet and to increase physical activity.  On statin.      Relevant Medications    exenatide microspheres (BYDUREON) 2 mg ER injection suspension    Other Relevant Orders    Microalbumin/creatinine urine ratio    Hemoglobin A1C       Orthopedic    Osteopenia of neck of femur    Current Assessment & Plan     Pt with osteopenia. ?if exacerbated  by elevated parathyroid hormone. Discussed fall precautions. On vit D replacement. Needs calcium 1200 mg daily (preferrably from food sources). No indication for further intervention at this point. Repeat another dexa 2 years from prior. Has intermittently elevated total protein. Should f/u with PCP regarding this. ? If related to myasthenia medications.            Other    Class 2 severe obesity due to excess calories with serious comorbidity and body mass index (BMI) of 37.0 to 37.9 in adult    Current Assessment & Plan     BMI 37. Encouraged healthy low carb/calorie diet and for pt to increase physical activity. Starting GLP-1 which should help with weight loss. Pt concerned about losing to much weight.                RTC in 3 months (virtual visit) with HbA1C and urine microalbumin  Needs to set up my chart yenny and access.

## 2020-07-17 NOTE — ASSESSMENT & PLAN NOTE
BP slightly above goal. Does not check at home. Was rushing into clinic. Continue current meds. Advised pt to get a cuff an monitor.  Notify MD if blood pressure more than 140/90.

## 2020-07-17 NOTE — ASSESSMENT & PLAN NOTE
Continue statin. Last FLP Nov 2019- , TG 73. Most recent CMP AST/ALT both trending up. F/u with PCP for further work up.

## 2020-07-17 NOTE — ASSESSMENT & PLAN NOTE
Pt with macrovascular complication of CVA.  BG not at goal and pt has not been checking BG recently.   HbA1C improved to 8.5%. Goal is 7% if this can be obtained without hypoglycemia.  Continu current insulin doses.   Start Bydureon then stop Januvia.    Did not tolerate SGLT-2i.   Resume metformin.  Check BG before meals and bedtime. Bring log to next visit.   Call MD in 1 week with blood glucose log to adjust insulin doses after properly checking BG.  Last eye exam <1 yr ago and no DPR per pt.  Last urine micro wnls June 2019. Rehceck now.   Counseled pt on low carb/low fat diet and to increase physical activity.  On statin.

## 2020-07-17 NOTE — LETTER
July 17, 2020      Sherice Gerardo MD  [address] [place]  [facility] [address]           [facility]  [address] [place] [address]  Phone: [phone]  Fax: [phone]          Patient: Annie Mason   MR Number: [id number]   YOB: 1955   Date of Visit: 7/17/2020       Dear Dr. Sherice Gerardo:    Thank you for referring Annie Mason to me for evaluation. Attached you will find relevant portions of my assessment and plan of care.    If you have questions, please do not hesitate to call me. I look forward to following Annie Mason along with you.    Sincerely,    Juliette L. Sandifer Kum-Nji, MD    Enclosure  CC:  No Recipients    If you would like to receive this communication electronically, please contact [email or web address] or [phone] to request more information on [employer] access.    For providers and/or their staff who would like to refer a patient to [facility], please contact us through our one-stop-shop provider referral line, [facility], at [phone].    If you feel you have received this communication in error or would no longer like to receive these types of communications, please e-mail [email or web address]

## 2020-07-17 NOTE — ASSESSMENT & PLAN NOTE
Pt with osteopenia. ?if exacerbated by elevated parathyroid hormone. Discussed fall precautions. On vit D replacement. Needs calcium 1200 mg daily (preferrably from food sources). No indication for further intervention at this point. Repeat another dexa 2 years from prior. Has intermittently elevated total protein. Should f/u with PCP regarding this. ? If related to myasthenia medications.

## 2020-07-22 ENCOUNTER — TELEPHONE (OUTPATIENT)
Dept: NEUROLOGY | Facility: CLINIC | Age: 65
End: 2020-07-22

## 2020-07-22 ENCOUNTER — TELEPHONE (OUTPATIENT)
Dept: SURGERY | Facility: CLINIC | Age: 65
End: 2020-07-22

## 2020-07-22 DIAGNOSIS — Z01.818 PREOP EXAMINATION: Primary | ICD-10-CM

## 2020-07-22 DIAGNOSIS — T82.598A MECHANICAL COMPLICATION OF VENOUS CATHETER, INITIAL ENCOUNTER: Primary | ICD-10-CM

## 2020-07-22 DIAGNOSIS — T85.698A MALFUNCTION OF DEVICE, INITIAL ENCOUNTER: ICD-10-CM

## 2020-07-22 NOTE — TELEPHONE ENCOUNTER
I called Zonia at [facility] radiology and she'll call patient tomorrow to schedule.  I informed patient and I'll call her to set up her appointment with Dr. Jay.  Pako

## 2020-07-22 NOTE — TELEPHONE ENCOUNTER
I called patient to inform her that she has to have a Port study done prior to seeing Dr. Jay.  I'll cancel her appointment with Dr. Jay on 7/23/20 at 10am and I'll call her back to reschedule.  Pako

## 2020-07-22 NOTE — TELEPHONE ENCOUNTER
Pt stopped in clinic today to notify us that she is not going to take the cellcept as prescribed by Dr. Nava at last visit. Notified her that I will update Dr. Nava.

## 2020-07-22 NOTE — TELEPHONE ENCOUNTER
Noted.  It is of course her choice whether she wants to get better control of her myasthenia or not.

## 2020-07-23 ENCOUNTER — TELEPHONE (OUTPATIENT)
Dept: SURGERY | Facility: CLINIC | Age: 65
End: 2020-07-23

## 2020-07-23 NOTE — TELEPHONE ENCOUNTER
I called patient and scheduled her for a f/u port study on 7/30/20 at 10:30am with Dr. Jay in [place].  Pako

## 2020-07-30 ENCOUNTER — OFFICE VISIT (OUTPATIENT)
Dept: SURGERY | Facility: CLINIC | Age: 65
End: 2020-07-30
Payer: MEDICARE

## 2020-07-30 VITALS
SYSTOLIC BLOOD PRESSURE: 108 MMHG | HEIGHT: 63 IN | HEART RATE: 63 BPM | WEIGHT: 209.88 LBS | DIASTOLIC BLOOD PRESSURE: 63 MMHG | BODY MASS INDEX: 37.19 KG/M2 | TEMPERATURE: 98 F

## 2020-07-30 DIAGNOSIS — Z95.828 PORT-A-CATH IN PLACE: Primary | ICD-10-CM

## 2020-07-30 PROCEDURE — 99213 PR OFFICE/OUTPT VISIT, EST, LEVL III, 20-29 MIN: ICD-10-PCS | Mod: S$PBB,,, | Performed by: SURGERY

## 2020-07-30 PROCEDURE — 99213 OFFICE O/P EST LOW 20 MIN: CPT | Mod: S$PBB,,, | Performed by: SURGERY

## 2020-07-30 PROCEDURE — 99215 OFFICE O/P EST HI 40 MIN: CPT | Mod: PBBFAC,PO | Performed by: SURGERY

## 2020-07-30 PROCEDURE — 99999 PR PBB SHADOW E&M-EST. PATIENT-LVL V: CPT | Mod: PBBFAC,,, | Performed by: SURGERY

## 2020-07-30 PROCEDURE — 99999 PR PBB SHADOW E&M-EST. PATIENT-LVL V: ICD-10-PCS | Mod: PBBFAC,,, | Performed by: SURGERY

## 2020-07-30 RX ORDER — EXENATIDE 2 MG/.85ML
INJECTION, SUSPENSION, EXTENDED RELEASE SUBCUTANEOUS
COMMUNITY
Start: 2020-07-17 | End: 2020-09-08

## 2020-07-30 NOTE — PROGRESS NOTES
65 yo F whom I am familiar with.  Pt is s/p port placement by me in 12/16.  Pt returns today complaining of pain and swelling with infustion.  Notes also port does not aspirate.  Pt has been referred stating that the port is not functioning well.     No other changes in condition.  Pt gets infustional treatment for myasthenia    AFVSS  AAOx3  CTA B  Soft/nt/nd  Port noted in L chest.  Palpable with defined edges      Port study: 7/28    Fibrin sheath at the distal aspect of the port.  The port flushes well without difficulty.  Unable to aspirate through the port catheter.     Port study 10/19    Fibrin sheath along the distal edge of the port catheter with no evidence of catheter fracture or extravasation.  The port is unable to aspirate blood, however is okay for use.      A/P: port in place    D/w pt.  I am uncertain as to exact problem with port.  Will reach out to infusion suite for guidance.  Port seems to function in that it can flush appropriately which is the overall function of the port.  If accessing it is a problem could consider attempt at revision but d/w pt that I can not guarantee it will be better particularly given body habitus.  Pt notes that she will d/w infusion suite and will contact my office

## 2020-08-04 ENCOUNTER — TELEPHONE (OUTPATIENT)
Dept: PHARMACY | Facility: CLINIC | Age: 65
End: 2020-08-04

## 2020-08-06 ENCOUNTER — TELEPHONE (OUTPATIENT)
Dept: NEUROLOGY | Facility: CLINIC | Age: 65
End: 2020-08-06

## 2020-08-06 NOTE — TELEPHONE ENCOUNTER
Returned call to Aleta with [facility] infusion and had an in depth conversation with nurse re: pt serial tardiness/no shows/non compliance with infusion regimen. Pt showed up 45 mins late today for infusion pushing entire clinic back an hour for patients scheduled after her. This has been an ongoing issue for some time nurse reports. States that at this point, the manager and team have had to come to put some rules in place re: this including pt not being infused due to tardiness/noncomplaince. When pt was notified about rules being placed, pt became irate and became inappropriate behavior wise. Nurse reports that pt has at times come two hours late or calls an hour after her infusion was to start to say she wasn't coming, etc. Nurse and manager want to make sure that you are ok at this time if they refuse to infuse pt if she is late for appts. Please advise.

## 2020-08-06 NOTE — TELEPHONE ENCOUNTER
----- Message from Tawanda Abraham sent at 8/6/2020 10:28 AM CDT -----  Contact: Aleta/[facility]  Unsuccessful call placed to office.  Aleta called in regarding patient.  Patient is at the infusion center now for her infusion.  Call back number is [phone]

## 2020-08-06 NOTE — TELEPHONE ENCOUNTER
I agree with infusion clinic late policy.  Her infusions are scheduled in advance and she knows when to arrive.  If she cannot make her appointments on time at the facility due to transportation issues or otherwise, then that needs to be voiced so arrangements can be made to schedule her infusions at another facility.

## 2020-09-28 ENCOUNTER — TELEPHONE (OUTPATIENT)
Dept: NEUROLOGY | Facility: CLINIC | Age: 65
End: 2020-09-28

## 2020-09-28 NOTE — TELEPHONE ENCOUNTER
9:50AM -Message received from Erma REYES Incoming call from Meggan with Infusion Center ([phone]) regarding medication fell off Therapy Plan. Patient arrived at 9AM.     RN Coordinator contacted Meggan to verify orders. [employer] 40grams x 5days T9ocfry. Notified Dr. Nava of updated TP.     Sent to Dr. Nava for review and signature of updated TP.     Returned call to Meggan. She indicated medication released as  verbal order. MD will need to sign TP.

## 2020-10-16 ENCOUNTER — PATIENT MESSAGE (OUTPATIENT)
Dept: ENDOCRINOLOGY | Facility: CLINIC | Age: 65
End: 2020-10-16

## 2020-10-19 ENCOUNTER — OFFICE VISIT (OUTPATIENT)
Dept: ENDOCRINOLOGY | Facility: CLINIC | Age: 65
End: 2020-10-19
Payer: MEDICARE

## 2020-10-19 VITALS
HEART RATE: 79 BPM | DIASTOLIC BLOOD PRESSURE: 78 MMHG | SYSTOLIC BLOOD PRESSURE: 134 MMHG | BODY MASS INDEX: 36.75 KG/M2 | HEIGHT: 63 IN | OXYGEN SATURATION: 96 % | WEIGHT: 207.44 LBS

## 2020-10-19 DIAGNOSIS — I10 ESSENTIAL HYPERTENSION: ICD-10-CM

## 2020-10-19 DIAGNOSIS — M85.859 OSTEOPENIA OF NECK OF FEMUR, UNSPECIFIED LATERALITY: ICD-10-CM

## 2020-10-19 DIAGNOSIS — E11.65 TYPE 2 DIABETES MELLITUS WITH HYPERGLYCEMIA, WITH LONG-TERM CURRENT USE OF INSULIN: Primary | Chronic | ICD-10-CM

## 2020-10-19 DIAGNOSIS — Z79.4 TYPE 2 DIABETES MELLITUS WITH HYPERGLYCEMIA, WITH LONG-TERM CURRENT USE OF INSULIN: Primary | Chronic | ICD-10-CM

## 2020-10-19 DIAGNOSIS — E21.3 HYPERPARATHYROIDISM: ICD-10-CM

## 2020-10-19 DIAGNOSIS — E78.2 MIXED HYPERLIPIDEMIA: ICD-10-CM

## 2020-10-19 DIAGNOSIS — E03.9 ACQUIRED HYPOTHYROIDISM: ICD-10-CM

## 2020-10-19 DIAGNOSIS — E66.01 CLASS 2 SEVERE OBESITY DUE TO EXCESS CALORIES WITH SERIOUS COMORBIDITY AND BODY MASS INDEX (BMI) OF 37.0 TO 37.9 IN ADULT: ICD-10-CM

## 2020-10-19 PROCEDURE — 99999 PR PBB SHADOW E&M-EST. PATIENT-LVL III: ICD-10-PCS | Mod: PBBFAC,,, | Performed by: INTERNAL MEDICINE

## 2020-10-19 PROCEDURE — 99214 OFFICE O/P EST MOD 30 MIN: CPT | Mod: S$PBB,,, | Performed by: INTERNAL MEDICINE

## 2020-10-19 PROCEDURE — 99214 PR OFFICE/OUTPT VISIT, EST, LEVL IV, 30-39 MIN: ICD-10-PCS | Mod: S$PBB,,, | Performed by: INTERNAL MEDICINE

## 2020-10-19 PROCEDURE — 99999 PR PBB SHADOW E&M-EST. PATIENT-LVL III: CPT | Mod: PBBFAC,,, | Performed by: INTERNAL MEDICINE

## 2020-10-19 PROCEDURE — 99213 OFFICE O/P EST LOW 20 MIN: CPT | Mod: PBBFAC,PO | Performed by: INTERNAL MEDICINE

## 2020-10-19 RX ORDER — PEN NEEDLE, DIABETIC 30 GX3/16"
NEEDLE, DISPOSABLE MISCELLANEOUS
Qty: 400 EACH | Refills: 3 | Status: SHIPPED | OUTPATIENT
Start: 2020-10-19 | End: 2021-04-22 | Stop reason: SDUPTHER

## 2020-10-19 RX ORDER — INSULIN GLARGINE 100 [IU]/ML
37 INJECTION, SOLUTION SUBCUTANEOUS NIGHTLY
Qty: 3 BOX | Refills: 3
Start: 2020-10-19 | End: 2021-04-22 | Stop reason: SDUPTHER

## 2020-10-19 RX ORDER — INSULIN ASPART 100 [IU]/ML
INJECTION, SOLUTION INTRAVENOUS; SUBCUTANEOUS
Qty: 3 BOX | Refills: 3 | Status: SHIPPED | OUTPATIENT
Start: 2020-10-19 | End: 2021-04-22 | Stop reason: SDUPTHER

## 2020-10-19 NOTE — ASSESSMENT & PLAN NOTE
Etiology unclear. Suspect secondary to lasix use vs PHPT. Most recent calcium wnls. In the past calcium mildly elevated; seemingly, this occurred while on HCTZ. Pt has nl renal function. Renal US with no stones. She does have osteopenia. PTH reduced by half after stopping Lasixs and is now barely elevated at 81. Continue spironolactone in place of lasixs to tx HTN. Unlike loop diuretics, RAAS inhibitors typically do not increase PTH levels.DEXA of forearm wnls. 24 hr urine ca/cr ratio 0.013. Most recent vitamin D adequate at 41. Continue to monitor this time. Recheck 12mths from prior.

## 2020-10-19 NOTE — PATIENT INSTRUCTIONS
Novolog 12 units with breakfast.    Increase Novolog with lunch and dinner to 14 units. If pre-meal blood sugar less than 100, reduce Novolog by 5-7 units.     Increase Basaglar to 37 units once daily.    Continue Januvia

## 2020-10-19 NOTE — LETTER
October 19, 2020      Sherice Gerardo MD  [address]  [facility] [address]           [facility]  [address] [place] [address]  Phone: [phone]  Fax: [phone]          Patient: Annie Mason   MR Number: [id number]   YOB: 1955   Date of Visit: 10/19/2020       Dear Dr. Sherice Gerardo:    Thank you for referring Annie Mason to me for evaluation. Attached you will find relevant portions of my assessment and plan of care.    If you have questions, please do not hesitate to call me. I look forward to following Annie Mason along with you.    Sincerely,    Juliette L. Sandifer Kum-Nji, MD    Enclosure  CC:  No Recipients    If you would like to receive this communication electronically, please contact [email or web address] or [phone] to request more information on [employer] access.    For providers and/or their staff who would like to refer a patient to [facility], please contact us through our one-stop-shop provider referral line, [facility], at [phone].    If you feel you have received this communication in error or would no longer like to receive these types of communications, please e-mail [email or web address]

## 2020-10-19 NOTE — ASSESSMENT & PLAN NOTE
Continue statin. Last FLP Nov 2019- , TG 73. Most recent CMP AST/ALT elevated. LDL not at goal. Recheck lipids at next visit.  F/u with PCP for further work up.

## 2020-10-19 NOTE — PROGRESS NOTES
Subjective:    Patient ID:  Annie Masno is a 64 y.o. female.    Chief Complaint:  Diabetes      Pt presents to follow up for type 2 diabetes.     PMH also significant for myasthenia gravis. Notes neuro wants to start her on a new drug that can suppress her immune system but she is not interested. Is very concerned about taking any new medications.     Overview: Onset of Type 2 diabetes mellitus was < 10 years ago. Has history of CVA but no other macrovascular complications. No microvascular complications. No history of DKA.   Cardiovascular risk factors: advanced age (older than 55 for men, 65 for women), diabetes mellitus, dyslipidemia, hypertension and sedentary lifestyle. No hx of DR per pt. No history of pancreatitis or MEN syndrome.       History of hypothyroidism and myasthenia gravis. Takes levothyroxine. No issues swallowing or voice changes.      Currently taking:  Novolog 12 units with meals. If pre-meal blood sugar less than 100, reduce Novolog by 3-6 units.   Basaglar 35 units at night  Januvia 100. No hx of pancreatitis or MEN syndrome.   Did not resume metformin as suggested at last visit. Read an article that metformin can cause spots on her legs.     Started Farxiga and took about 1 week. Had dizziness and polyuria so she stopped. Tried Bydureon but had severe nausea and stopped taking.      BG has been elevated. Recent HbA1C 9.3% previously 8.5%. Notes she has been a lot of strawberries short cake icecream. Left meter in [place] and was without it for a few weeks. Plans to keep working on portion size. Has reduced pasta/rice/and carbs.  Sometimes snacks between meals. Typically eats 3 meals a day. Notes often gets hungry around 9 pm. Snacks on tuna fish with crackers in during this time.      Home blood sugar records:   Fasting   Pre lunch   Pre -dinner   Bedtime        Does not exercise regularly.   Last eye exam <1 year ago and no DR per pt.  Any episodes of  hypoglycemia? No. Lowest was 80s.          In regards to HPT, denies history of fractures. Had kidney stone once several years ago. Is taking ergocalciferol once weekly. No recent falls. Has been taking lasix for about one year but now on Spironolactone. Renal US  6/30 with no stones. Dexa L forearm 6/30 wnls.  Had osteopenia of femoral neck 3/2020.           Diabetes Management Status    Statin: Taking  ACE/ARB: Not taking    Screening or Prevention Patient's value Goal Complete/Controlled?   HgA1C Testing and Control   Lab Results   Component Value Date    HGBA1C 9.3 (H) 10/15/2020      Annually/Less than 8% No   Lipid profile : 11/01/2019 Annually Yes   LDL control Lab Results   Component Value Date    LDLCALC 116.4 11/01/2019    Annually/Less than 100 mg/dl  No   Nephropathy screening Lab Results   Component Value Date    LABMICR 44.2 10/15/2020     Lab Results   Component Value Date    PROTEINUA Negative 07/19/2019    Annually Yes   Blood pressure BP Readings from Last 1 Encounters:   10/19/20 134/78    Less than 140/90 Yes   Dilated retinal exam : 09/16/2020 Annually Yes   Foot exam   : 01/30/2020 Annually Yes       Review of Systems   Constitutional: Negative for fatigue and unexpected weight change.   Eyes: Negative for visual disturbance.   Respiratory: Negative for shortness of breath.    Cardiovascular: Negative for chest pain.   Gastrointestinal: Negative for abdominal pain.   Musculoskeletal: Negative for myalgias.   Skin: Negative for wound.   Neurological: Negative for headaches.   Hematological: Does not bruise/bleed easily.   Psychiatric/Behavioral: Negative for sleep disturbance.        Past Medical History:   Diagnosis Date    Allergy     Anticoagulant long-term use     Brachial plexitis     Depression     Diabetes mellitus, type 2     GERD (gastroesophageal reflux disease)     Hyperlipidemia 2/4/2015    Hypertension     Hypothyroidism     MG (myasthenia gravis)     Mild intermittent  "asthma with acute exacerbation 12/26/2017    Myasthenia gravis without exacerbation 3/25/2015    Seizures     "years ago"    Stroke     2008      Social History     Tobacco Use    Smoking status: Never Smoker    Smokeless tobacco: Never Used   Substance Use Topics    Alcohol use: No    Drug use: No     Family History   Problem Relation Age of Onset    Diabetes Mother     Hypertension Mother     Hypertension Father     Stroke Father     Ovarian cancer Maternal Aunt     Breast cancer Maternal Aunt         age unknown    Myasthenia gravis Paternal Aunt     Diabetes type I Son     Breast cancer Sister 64      Past Surgical History:   Procedure Laterality Date    CHOLECYSTECTOMY      CORONARY ANGIOGRAPHY N/A 1/28/2019    Procedure: ANGIOGRAM, CORONARY ARTERY;  Surgeon: Ike Jasso III, MD;  Location: [facility] CATH;  Service: Cardiology;  Laterality: N/A;    LEFT HEART CATHETERIZATION Left 1/28/2019    Procedure: Left heart cath;  Surgeon: Ike Jasso III, MD;  Location: [facility] CATH;  Service: Cardiology;  Laterality: Left;    PORTACATH PLACEMENT  12/14/2016    thalmusectomy      TUBAL LIGATION            Current Outpatient Medications:     albuterol (PROVENTIL) 2.5 mg /3 mL (0.083 %) nebulizer solution, Take 2.5 mg by nebulization every 6 (six) hours as needed for Wheezing. Rescue, Disp: , Rfl:     albuterol sulfate (PROAIR RESPICLICK) 90 mcg/actuation inhaler, Inhale 180 mcg into the lungs every 4 (four) hours. Rescue , Disp: , Rfl:     aspirin (ECOTRIN) 81 MG EC tablet, Take 81 mg by mouth once daily., Disp: , Rfl:     atorvastatin (LIPITOR) 40 MG tablet, Take 1 tablet (40 mg total) by mouth once daily., Disp: 90 tablet, Rfl: 3    BD VEO INSULIN SYRINGE UF 0.3 mL 31 gauge x 15/64" Syrg, USE TO INJECT NOVOLOG 3 TIMES DAILY, Disp: , Rfl: 3    blood pressure monitor Kit, Use to check blood pressure daily, Disp: , Rfl: 0    blood sugar diagnostic (ONETOUCH VERIO) Strp, Check blood sugar 4 times " daily.  DX E11.65., Disp: 200 strip, Rfl: 11    blood-glucose meter kit, Please check blood sugar four times daily and as needed. Re: Diabetes E11.65, Disp: 1 each, Rfl: 1    BREO ELLIPTA 200-25 mcg/dose DsDv diskus inhaler, INHALE 1 PUFF BY MOUTH INTO LUNGS ONCE EVERY DAY, Disp: 180 each, Rfl: 3    carvedilol (COREG) 25 MG tablet, TAKE 1 TABLET BY MOUTH TWICE A DAY WITH FOOD, Disp: 180 tablet, Rfl: 3    compress.stocking,knee,reg,med Misc, 1 Pair by Misc.(Non-Drug; Combo Route) route once daily. 15-20 mmHg, Disp: 2 each, Rfl: 1    dapagliflozin (FARXIGA) 5 mg Tab tablet, Take 1 tablet (5 mg total) by mouth once daily., Disp: 30 tablet, Rfl: 11    diclofenac sodium (VOLTAREN) 1 % Gel, Apply 2 g topically 3 (three) times daily. To neck for pain, Disp: 100 g, Rfl: 0    DULoxetine (CYMBALTA) 20 MG capsule, Take 20 mg by mouth once daily., Disp: , Rfl:     ergocalciferol (ERGOCALCIFEROL) 50,000 unit Cap, TAKE 1 CAPSULE BY MOUTH ONE TIME PER WEEK, Disp: 12 capsule, Rfl: 1    famotidine (PEPCID) 40 MG tablet, Take 1 tablet (40 mg total) by mouth nightly., Disp: 30 tablet, Rfl: 2    fluocinonide 0.05% (LIDEX) 0.05 % cream, Apply topically 2 (two) times daily., Disp: 1 Tube, Rfl: 3    fluticasone (FLONASE) 50 mcg/actuation nasal spray, 2 sprays each  nostril bid x 1 week then q day prn  nasal congestion or sneezing, Disp: 1 Bottle, Rfl: 3    hydroCHLOROthiazide (HYDRODIURIL) 25 MG tablet, TAKE 1 TABLET BY MOUTH EVERY DAY IN THE MORNING, Disp: 90 tablet, Rfl: 3    hydrocodone-acetaminophen 5-325mg (NORCO) 5-325 mg per tablet, Take 1 tablet by mouth every 6 (six) hours as needed for Pain., Disp: 30 tablet, Rfl: 0    hydrOXYzine HCL (ATARAX) 25 MG tablet, TAKE 1 TABLET (25 MG TOTAL) BY MOUTH EVERY 6 (SIX) HOURS AS NEEDED FOR ITCHING., Disp: 360 tablet, Rfl: 0    insulin (BASAGLAR KWIKPEN U-100 INSULIN) glargine 100 units/mL (3mL) SubQ pen, Inject 37 Units into the skin every evening., Disp: 3 Box, Rfl: 3     "insulin aspart U-100 (NOVOLOG FLEXPEN U-100 INSULIN) 100 unit/mL (3 mL) InPn pen, Continue 12 units with breakfast. Increase to 14 units with lunch and dinner. Reduce by 5-7 units if premeal blood glucose <100., Disp: 3 Box, Rfl: 3    ketoconazole (NIZORAL) 2 % shampoo, Apply topically twice a week., Disp: 120 mL, Rfl: 1    lancets (ONETOUCH DELICA LANCETS) 33 gauge Misc, 1 lancet by Misc.(Non-Drug; Combo Route) route once daily., Disp: 100 each, Rfl: 3    levothyroxine (SYNTHROID) 50 MCG tablet, TAKE 1 TABLET BY MOUTH EVERY DAY, Disp: 30 tablet, Rfl: 11    loratadine (CLARITIN) 10 mg tablet, TAKE 1 TABLET ONCE A DAY AS NEEDED FOR ALLERGIES, Disp: 90 tablet, Rfl: 2    magnesium 30 mg Tab, Take by mouth once., Disp: , Rfl:     meloxicam (MOBIC) 7.5 MG tablet, Take 7.5 mg by mouth as needed. FOR 7 DAYS, Disp: , Rfl: 0    multivitamin (ONE DAILY MULTIVITAMIN) per tablet, Take 1 tablet by mouth once daily., Disp: , Rfl:     nystatin (MYCOSTATIN) cream, Apply topically 2 (two) times daily., Disp: 30 g, Rfl: 1    ondansetron (ZOFRAN-ODT) 8 MG TbDL, Take 1 tablet (8 mg total) by mouth 3 (three) times daily as needed (NAUSEA OR VOMITING)., Disp: 15 tablet, Rfl: 3    pen needle, diabetic (BD ULTRA-FINE SHORT PEN NEEDLE) 31 gauge x 5/16" Ndle, Use 4x daily with insulin, Disp: 400 each, Rfl: 3    potassium chloride SA (K-DUR,KLOR-CON) 20 MEQ tablet, TAKE 1 TABLET BY MOUTH ONCE DAILY, Disp: 90 tablet, Rfl: 1    pyridostigmine (MESTINON) 60 mg Tab, TAKE 1 TABLET FOUR TIMES A DAY, Disp: 360 tablet, Rfl: 3    spironolactone (ALDACTONE) 25 MG tablet, Take 1 tablet (25 mg total) by mouth once daily., Disp: 30 tablet, Rfl: 11    vitamin B comp and C no.3 15-10-50-5-300 mg Cap, 1 po q day, Disp: 90 capsule, Rfl: 3    Current Facility-Administered Medications:     cyanocobalamin injection 1,000 mcg, 1,000 mcg, Intramuscular, Q30 Days, ISABELLA Sosa, 1,000 mcg at 07/09/20 1413     Review of patient's allergies " "indicates:   Allergen Reactions    Ramipril      Other reaction(s): caused a severe cough        Objective:   /78   Pulse 79   Ht 5' 3" (1.6 m)   Wt 94.1 kg (207 lb 7.3 oz)   SpO2 96%   BMI 36.75 kg/m²   BP Readings from Last 3 Encounters:   10/19/20 134/78   10/06/20 122/70   10/02/20 131/60     Wt Readings from Last 3 Encounters:   10/19/20 0957 94.1 kg (207 lb 7.3 oz)   10/06/20 1517 93.9 kg (207 lb)   10/01/20 0922 93.9 kg (207 lb 0.2 oz)          Physical Exam  Vitals signs reviewed.   Constitutional:       General: She is not in acute distress.     Appearance: Normal appearance. She is well-developed. She is not ill-appearing, toxic-appearing or diaphoretic.   HENT:      Head: Normocephalic and atraumatic.      Right Ear: External ear normal.      Left Ear: External ear normal.   Eyes:      General: No scleral icterus.  Neck:      Trachea: No tracheal deviation.   Pulmonary:      Effort: Pulmonary effort is normal. No respiratory distress.   Abdominal:      General: There is no distension.   Neurological:      General: No focal deficit present.      Mental Status: She is alert and oriented to person, place, and time.   Psychiatric:         Thought Content: Thought content normal.           Lab Results   Component Value Date     07/08/2020    K 4.3 07/08/2020     07/08/2020    CO2 27 07/08/2020    BUN 12 07/08/2020    CREATININE 0.62 07/08/2020     (H) 07/08/2020    HGBA1C 9.3 (H) 10/15/2020    MG 1.8 02/26/2019    AST 62 (H) 09/28/2020    AST 44 (H) 03/05/2016    ALT 29 09/28/2020    ALBUMIN 4.5 09/28/2020    PROT 8.7 (H) 09/28/2020    BILITOT 1.2 09/28/2020    WBC 4.73 07/08/2020    HGB 13.7 07/08/2020    HCT 42.0 07/08/2020    MCV 94 07/08/2020    MCH 30.5 07/08/2020     07/08/2020    MPV 11.3 07/08/2020    GRAN 3.0 07/08/2020    GRAN 62.6 07/08/2020    LYMPH 1.3 07/08/2020    LYMPH 27.5 07/08/2020    CHOL 175 11/01/2019    HDL 44 11/01/2019    LDLCALC 116.4 11/01/2019 "    TRIG 73 11/01/2019       Lab Results   Component Value Date    TSH 0.846 11/01/2019    FREET4 0.81 03/05/2016        Thyroid Labs Latest Ref Rng & Units 7/8/2020 7/15/2020 9/28/2020   TSH 0.400 - 4.000 uIU/mL - - -   Free T4 0.78 - 2.19 ng/dL - - -   Sodium 136 - 145 mmol/L 137 - -   Potassium 3.5 - 5.1 mmol/L 4.3 - -   Chloride 95 - 110 mmol/L 101 - -   Carbon Dioxide 22 - 31 mmol/L 27 - -   Glucose 70 - 110 mg/dL 224(H) - -   Blood Urea Nitrogen 7 - 18 mg/dL 12 - -   Creatinine 0.50 - 1.40 mg/dL 0.62 - -   Calcium 8.4 - 10.2 mg/dL 10.2 9.9 -   Total Protein 6.0 - 8.4 g/dL 9.4(H) - 8.7(H)   Albumin 3.5 - 5.2 g/dL 4.4 - 4.5   Total Bilirubin 0.2 - 1.3 mg/dL 0.3 - 1.2   AST 14 - 36 U/L 79(H) - 62(H)   ALT 0 - 35 U/L 53(H) - 29   Anion Gap 8 - 16 mmol/L 9 - -   eGFR (African American) >60 mL/min/1.73 m:2 >60 - -   eGFR (Non-African American) >60 mL/min/1.73 m:2 >60 - -   WBC 3.90 - 12.70 K/uL 4.73 - -   RBC 4.00 - 5.40 M/uL 4.49 - -   Hemoglobin 12.0 - 16.0 g/dL 13.7 - -   Hematocrit 37.0 - 48.5 % 42.0 - -   MCV 82 - 98 fL 94 - -   MCH 27.0 - 31.0 pg 30.5 - -   MCHC 32.0 - 36.0 g/dL 32.6 - -   RDW 11.5 - 14.5 % 13.6 - -   Platelets 150 - 350 K/uL 188 - -   MPV 9.2 - 12.9 fL 11.3 - -   Gran # 1.8 - 7.7 K/uL 3.0 - -   Lymph # 1.0 - 4.8 K/uL 1.3 - -   Mono # 0.3 - 1.0 K/uL 0.4 - -   Eos # 0.0 - 0.5 K/uL 0.1 - -   Baso # 0.00 - 0.20 K/uL 0.04 - -   Gran % 38.0 - 73.0 % 62.6 - -   Lymph % 18.0 - 48.0 % 27.5 - -   Mono% 4.0 - 15.0 % 7.6 - -   Eos % 0.0 - 8.0 % 1.1 - -   Baso % 0.0 - 1.9 % 0.8 - -   INR - - - -           Hemoglobin A1C   Date Value Ref Range Status   10/15/2020 9.3 (H) 0.0 - 5.6 % Final     Comment:     Reference Interval:  5.0 - 5.6 Normal   5.7 - 6.4 High Risk   > 6.5 Diabetic    Hgb A1c results are standardized based on the (NGSP) National   Glycohemoglobin Standardization Program.    Hemoglobin A1C levels are related to mean serum/plasma glucose   during the preceding 2-3 months.        07/15/2020 8.5  (H) 0.0 - 5.6 % Final     Comment:     Reference Interval:  5.0 - 5.6 Normal   5.7 - 6.4 High Risk   > 6.5 Diabetic    Hgb A1c results are standardized based on the (NGSP) National   Glycohemoglobin Standardization Program.    Hemoglobin A1C levels are related to mean serum/plasma glucose   during the preceding 2-3 months.        06/09/2020 9.0 (H) 0.0 - 5.6 % Final     Comment:     Reference Interval:  5.0 - 5.6 Normal   5.7 - 6.4 High Risk   > 6.5 Diabetic    Hgb A1c results are standardized based on the (NGSP) National   Glycohemoglobin Standardization Program.    Hemoglobin A1C levels are related to mean serum/plasma glucose   during the preceding 2-3 months.              Assessment and plan:       Problem List Items Addressed This Visit        Cardiac/Vascular    Essential hypertension    Current Assessment & Plan     BP controlled. Continue current meds           Hyperlipidemia    Current Assessment & Plan     Continue statin. Last FLP Nov 2019- , TG 73. Most recent CMP AST/ALT elevated. LDL not at goal. Recheck lipids at next visit.  F/u with PCP for further work up.             Endocrine    Diabetes - Primary    Current Assessment & Plan     Pt with macrovascular complication of CVA.  BG not at goal   HbA1C trended up from 8.5% to 9.3%.   Goal is <7% if this can be obtained without hypoglycemia.  Continue Novolog 12 units with breakfast.  Increase Novolog with lunch and dinner to 14 units. If pre-meal blood sugar less than 100, reduce Novolog by 5-7 units.   Increase Basaglar to 37 units once daily.  Continue Januvia  Did not tolerate SGLT-2i, GLP-1, or metformin.  Check BG before meals and bedtime. Bring log to next visit.   Last eye exam <1 yr ago and no DPR per pt.  Urine micro wnls.  Counseled pt on low carb/low fat diet and to increase physical activity.  On statin.               Relevant Medications    insulin (BASAGLAR KWIKPEN U-100 INSULIN) glargine 100 units/mL (3mL) SubQ pen    insulin  "aspart U-100 (NOVOLOG FLEXPEN U-100 INSULIN) 100 unit/mL (3 mL) InPn pen    pen needle, diabetic (BD ULTRA-FINE SHORT PEN NEEDLE) 31 gauge x 5/16" Ndle    Other Relevant Orders    TSH    Lipid Panel    Hypothyroidism    Current Assessment & Plan     Clinically euthyroid. TSH 0.846 wnls Nov 2019. Recheck prior to next visit.         Hyperparathyroidism    Current Assessment & Plan     Etiology unclear. Suspect secondary to lasix use vs PHPT. Most recent calcium wnls. In the past calcium mildly elevated; seemingly, this occurred while on HCTZ. Pt has nl renal function. Renal US with no stones. She does have osteopenia. PTH reduced by half after stopping Lasixs and is now barely elevated at 81. Continue spironolactone in place of lasixs to tx HTN. Unlike loop diuretics, RAAS inhibitors typically do not increase PTH levels.DEXA of forearm wnls. 24 hr urine ca/cr ratio 0.013. Most recent vitamin D adequate at 41. Continue to monitor this time. Recheck 12mths from prior.            Orthopedic    Osteopenia of neck of femur    Current Assessment & Plan     Pt with osteopenia. ?if exacerbated by elevated parathyroid hormone. Discussed fall precautions. On vit D replacement. Needs calcium 1200 mg daily (preferrably from food sources). No indication for further intervention at this point. Repeat another dexa 2 years from prior. Has intermittently elevated total protein. Should f/u with PCP regarding this. ? If related to myasthenia medications.            Other    Class 2 severe obesity due to excess calories with serious comorbidity and body mass index (BMI) of 37.0 to 37.9 in adult    Current Assessment & Plan     BMI 36. Encouraged healthy low fat low carb diet and increase physical activity                   RTC early Jan 2021 with TSH, lipid profile          "

## 2020-10-19 NOTE — ASSESSMENT & PLAN NOTE
Pt with macrovascular complication of CVA.  BG not at goal   HbA1C trended up from 8.5% to 9.3%.   Goal is <7% if this can be obtained without hypoglycemia.  Continue Novolog 12 units with breakfast.  Increase Novolog with lunch and dinner to 14 units. If pre-meal blood sugar less than 100, reduce Novolog by 5-7 units.   Increase Basaglar to 37 units once daily.  Continue Januvia  Did not tolerate SGLT-2i, GLP-1, or metformin.  Check BG before meals and bedtime. Bring log to next visit.   Last eye exam <1 yr ago and no DPR per pt.  Urine micro wnls.  Counseled pt on low carb/low fat diet and to increase physical activity.  On statin.

## 2020-10-23 NOTE — TELEPHONE ENCOUNTER
Appointment is on 7/23/20 with Dr. Jay at 10am in [place]   Josefina rey from [facility] patient had pregnant for 35 weeks, due date is on 11/10/2020, needs sooner appointment with Dr. Rodriguez for Mrsa.  She will fax information to [phone] today, request a call back from doctor office asap

## 2020-12-20 ENCOUNTER — TELEPHONE (OUTPATIENT)
Dept: ENDOCRINOLOGY | Facility: CLINIC | Age: 65
End: 2020-12-20

## 2020-12-20 DIAGNOSIS — E03.9 ACQUIRED HYPOTHYROIDISM: Primary | ICD-10-CM

## 2020-12-20 DIAGNOSIS — E78.2 MIXED HYPERLIPIDEMIA: ICD-10-CM

## 2020-12-20 NOTE — TELEPHONE ENCOUNTER
Labs reviewed  TSH within normal limits. continue current dose of thyroid hormone    Reviewed lipid panel.   which is above goal.  Patient currently on Lipitor 40 mg daily.  Will need to discuss increasing statin therapy dose or adding another lipid lowering medication

## 2020-12-21 ENCOUNTER — PATIENT MESSAGE (OUTPATIENT)
Dept: OTHER | Facility: OTHER | Age: 65
End: 2020-12-21

## 2021-01-14 ENCOUNTER — TELEPHONE (OUTPATIENT)
Dept: NEUROLOGY | Facility: CLINIC | Age: 66
End: 2021-01-14

## 2021-01-14 ENCOUNTER — TELEPHONE (OUTPATIENT)
Dept: ENDOCRINOLOGY | Facility: CLINIC | Age: 66
End: 2021-01-14

## 2021-01-14 DIAGNOSIS — E11.65 TYPE 2 DIABETES MELLITUS WITH HYPERGLYCEMIA, WITH LONG-TERM CURRENT USE OF INSULIN: Primary | ICD-10-CM

## 2021-01-14 DIAGNOSIS — Z79.4 TYPE 2 DIABETES MELLITUS WITH HYPERGLYCEMIA, WITH LONG-TERM CURRENT USE OF INSULIN: Primary | ICD-10-CM

## 2021-01-21 ENCOUNTER — OFFICE VISIT (OUTPATIENT)
Dept: ENDOCRINOLOGY | Facility: CLINIC | Age: 66
End: 2021-01-21
Payer: MEDICARE

## 2021-01-21 VITALS
BODY MASS INDEX: 35.75 KG/M2 | HEART RATE: 78 BPM | SYSTOLIC BLOOD PRESSURE: 120 MMHG | HEIGHT: 63 IN | WEIGHT: 201.75 LBS | DIASTOLIC BLOOD PRESSURE: 78 MMHG | OXYGEN SATURATION: 97 % | TEMPERATURE: 97 F

## 2021-01-21 DIAGNOSIS — Z86.39 HISTORY OF VITAMIN D DEFICIENCY: ICD-10-CM

## 2021-01-21 DIAGNOSIS — I10 ESSENTIAL HYPERTENSION: ICD-10-CM

## 2021-01-21 DIAGNOSIS — E21.3 HYPERPARATHYROIDISM: ICD-10-CM

## 2021-01-21 DIAGNOSIS — E78.2 MIXED HYPERLIPIDEMIA: ICD-10-CM

## 2021-01-21 DIAGNOSIS — E03.9 ACQUIRED HYPOTHYROIDISM: ICD-10-CM

## 2021-01-21 DIAGNOSIS — Z79.4 TYPE 2 DIABETES MELLITUS WITH HYPERGLYCEMIA, WITH LONG-TERM CURRENT USE OF INSULIN: ICD-10-CM

## 2021-01-21 DIAGNOSIS — E11.65 TYPE 2 DIABETES MELLITUS WITH HYPERGLYCEMIA, WITH LONG-TERM CURRENT USE OF INSULIN: ICD-10-CM

## 2021-01-21 DIAGNOSIS — E66.01 CLASS 2 SEVERE OBESITY DUE TO EXCESS CALORIES WITH SERIOUS COMORBIDITY AND BODY MASS INDEX (BMI) OF 35.0 TO 35.9 IN ADULT: ICD-10-CM

## 2021-01-21 DIAGNOSIS — E11.59 TYPE 2 DIABETES MELLITUS WITH OTHER CIRCULATORY COMPLICATION, WITH LONG-TERM CURRENT USE OF INSULIN: ICD-10-CM

## 2021-01-21 DIAGNOSIS — M85.859 OSTEOPENIA OF NECK OF FEMUR, UNSPECIFIED LATERALITY: ICD-10-CM

## 2021-01-21 DIAGNOSIS — Z79.4 TYPE 2 DIABETES MELLITUS WITH OTHER CIRCULATORY COMPLICATION, WITH LONG-TERM CURRENT USE OF INSULIN: ICD-10-CM

## 2021-01-21 DIAGNOSIS — I10 BENIGN ESSENTIAL HTN: ICD-10-CM

## 2021-01-21 PROCEDURE — 99215 OFFICE O/P EST HI 40 MIN: CPT | Mod: PBBFAC,PO | Performed by: INTERNAL MEDICINE

## 2021-01-21 PROCEDURE — 99999 PR PBB SHADOW E&M-EST. PATIENT-LVL V: ICD-10-PCS | Mod: PBBFAC,,, | Performed by: INTERNAL MEDICINE

## 2021-01-21 PROCEDURE — 99215 PR OFFICE/OUTPT VISIT, EST, LEVL V, 40-54 MIN: ICD-10-PCS | Mod: S$PBB,,, | Performed by: INTERNAL MEDICINE

## 2021-01-21 PROCEDURE — 99999 PR PBB SHADOW E&M-EST. PATIENT-LVL V: CPT | Mod: PBBFAC,,, | Performed by: INTERNAL MEDICINE

## 2021-01-21 PROCEDURE — 99215 OFFICE O/P EST HI 40 MIN: CPT | Mod: S$PBB,,, | Performed by: INTERNAL MEDICINE

## 2021-01-21 RX ORDER — ATORVASTATIN CALCIUM 80 MG/1
80 TABLET, FILM COATED ORAL DAILY
Qty: 90 TABLET | Refills: 3 | Status: SHIPPED | OUTPATIENT
Start: 2021-01-21 | End: 2021-06-29 | Stop reason: SDUPTHER

## 2021-01-21 RX ORDER — INSULIN PUMP SYRINGE, 3 ML
EACH MISCELLANEOUS
Qty: 1 EACH | Refills: 1 | Status: SHIPPED | OUTPATIENT
Start: 2021-01-21 | End: 2021-06-29 | Stop reason: SDUPTHER

## 2021-01-21 RX ORDER — BLOOD-GLUCOSE METER
EACH MISCELLANEOUS
Qty: 400 STRIP | Refills: 11 | Status: SHIPPED | OUTPATIENT
Start: 2021-01-21 | End: 2023-03-16 | Stop reason: SDUPTHER

## 2021-02-23 ENCOUNTER — OFFICE VISIT (OUTPATIENT)
Dept: NEUROLOGY | Facility: CLINIC | Age: 66
End: 2021-02-23
Payer: MEDICARE

## 2021-02-23 VITALS
BODY MASS INDEX: 35.02 KG/M2 | HEIGHT: 63 IN | WEIGHT: 197.63 LBS | SYSTOLIC BLOOD PRESSURE: 171 MMHG | HEART RATE: 72 BPM | DIASTOLIC BLOOD PRESSURE: 90 MMHG

## 2021-02-23 DIAGNOSIS — F22 PARANOIA: ICD-10-CM

## 2021-02-23 DIAGNOSIS — G70.00 MYASTHENIA GRAVIS WITHOUT EXACERBATION: Primary | ICD-10-CM

## 2021-02-23 DIAGNOSIS — R53.1 WEAKNESS: ICD-10-CM

## 2021-02-23 DIAGNOSIS — H53.2 DIPLOPIA: ICD-10-CM

## 2021-02-23 PROCEDURE — 99215 OFFICE O/P EST HI 40 MIN: CPT | Mod: PBBFAC,PO | Performed by: PSYCHIATRY & NEUROLOGY

## 2021-02-23 PROCEDURE — 99999 PR PBB SHADOW E&M-EST. PATIENT-LVL V: CPT | Mod: PBBFAC,,, | Performed by: PSYCHIATRY & NEUROLOGY

## 2021-02-23 PROCEDURE — 99999 PR PBB SHADOW E&M-EST. PATIENT-LVL V: ICD-10-PCS | Mod: PBBFAC,,, | Performed by: PSYCHIATRY & NEUROLOGY

## 2021-02-23 PROCEDURE — 99215 PR OFFICE/OUTPT VISIT, EST, LEVL V, 40-54 MIN: ICD-10-PCS | Mod: S$PBB,,, | Performed by: PSYCHIATRY & NEUROLOGY

## 2021-02-23 PROCEDURE — 99215 OFFICE O/P EST HI 40 MIN: CPT | Mod: S$PBB,,, | Performed by: PSYCHIATRY & NEUROLOGY

## 2021-02-23 RX ORDER — PYRIDOSTIGMINE BROMIDE 60 MG/1
60 TABLET ORAL EVERY 6 HOURS
Qty: 360 TABLET | Refills: 3 | Status: SHIPPED | OUTPATIENT
Start: 2021-02-23 | End: 2024-02-28

## 2021-03-03 DIAGNOSIS — E11.65 TYPE 2 DIABETES MELLITUS WITH HYPERGLYCEMIA, WITH LONG-TERM CURRENT USE OF INSULIN: ICD-10-CM

## 2021-03-03 DIAGNOSIS — Z79.4 TYPE 2 DIABETES MELLITUS WITH HYPERGLYCEMIA, WITH LONG-TERM CURRENT USE OF INSULIN: ICD-10-CM

## 2021-03-03 RX ORDER — SITAGLIPTIN 100 MG/1
TABLET, FILM COATED ORAL
Qty: 90 TABLET | Refills: 0 | Status: SHIPPED | OUTPATIENT
Start: 2021-03-03 | End: 2021-04-22 | Stop reason: SDUPTHER

## 2021-03-17 ENCOUNTER — HOSPITAL ENCOUNTER (OUTPATIENT)
Dept: PREADMISSION TESTING | Facility: HOSPITAL | Age: 66
Discharge: HOME | End: 2021-03-17
Attending: REGISTERED NURSE
Payer: MEDICARE

## 2021-03-17 ENCOUNTER — OFFICE VISIT (OUTPATIENT)
Dept: PSYCHIATRY | Facility: CLINIC | Age: 66
End: 2021-03-17
Payer: MEDICARE

## 2021-03-17 VITALS
HEIGHT: 63 IN | DIASTOLIC BLOOD PRESSURE: 84 MMHG | HEART RATE: 68 BPM | WEIGHT: 194.25 LBS | BODY MASS INDEX: 34.42 KG/M2 | SYSTOLIC BLOOD PRESSURE: 130 MMHG

## 2021-03-17 DIAGNOSIS — F23 BRIEF PSYCHOTIC DISORDER: Primary | ICD-10-CM

## 2021-03-17 DIAGNOSIS — R44.1 VISUAL HALLUCINATION: ICD-10-CM

## 2021-03-17 DIAGNOSIS — F23 BRIEF PSYCHOTIC DISORDER: ICD-10-CM

## 2021-03-17 DIAGNOSIS — F51.05 INSOMNIA DUE TO OTHER MENTAL DISORDER: ICD-10-CM

## 2021-03-17 DIAGNOSIS — F99 INSOMNIA DUE TO OTHER MENTAL DISORDER: ICD-10-CM

## 2021-03-17 PROCEDURE — 99215 OFFICE O/P EST HI 40 MIN: CPT | Mod: PBBFAC,PN | Performed by: REGISTERED NURSE

## 2021-03-17 PROCEDURE — 93010 ELECTROCARDIOGRAM REPORT: CPT | Mod: ,,, | Performed by: INTERNAL MEDICINE

## 2021-03-17 PROCEDURE — 99999 PR PBB SHADOW E&M-EST. PATIENT-LVL V: ICD-10-PCS | Mod: PBBFAC,,, | Performed by: REGISTERED NURSE

## 2021-03-17 PROCEDURE — 90792 PSYCH DIAG EVAL W/MED SRVCS: CPT | Mod: ,,, | Performed by: REGISTERED NURSE

## 2021-03-17 PROCEDURE — 99999 PR PBB SHADOW E&M-EST. PATIENT-LVL V: CPT | Mod: PBBFAC,,, | Performed by: REGISTERED NURSE

## 2021-03-17 PROCEDURE — 93005 ELECTROCARDIOGRAM TRACING: CPT | Performed by: INTERNAL MEDICINE

## 2021-03-17 PROCEDURE — 93010 EKG 12-LEAD: ICD-10-PCS | Mod: ,,, | Performed by: INTERNAL MEDICINE

## 2021-03-17 PROCEDURE — 90792 PR PSYCHIATRIC DIAGNOSTIC EVALUATION W/MEDICAL SERVICES: ICD-10-PCS | Mod: ,,, | Performed by: REGISTERED NURSE

## 2021-03-17 RX ORDER — HALOPERIDOL 1 MG/1
0.5 TABLET ORAL 2 TIMES DAILY
Qty: 30 TABLET | Refills: 0 | Status: SHIPPED | OUTPATIENT
Start: 2021-03-17 | End: 2021-04-05 | Stop reason: DRUGHIGH

## 2021-03-25 ENCOUNTER — TELEPHONE (OUTPATIENT)
Dept: ENDOCRINOLOGY | Facility: CLINIC | Age: 66
End: 2021-03-25

## 2021-04-05 ENCOUNTER — OFFICE VISIT (OUTPATIENT)
Dept: PSYCHIATRY | Facility: CLINIC | Age: 66
End: 2021-04-05
Payer: MEDICARE

## 2021-04-05 VITALS
HEIGHT: 63 IN | WEIGHT: 205.81 LBS | DIASTOLIC BLOOD PRESSURE: 88 MMHG | HEART RATE: 64 BPM | SYSTOLIC BLOOD PRESSURE: 145 MMHG | BODY MASS INDEX: 36.46 KG/M2

## 2021-04-05 DIAGNOSIS — F23 BRIEF PSYCHOTIC DISORDER: Primary | ICD-10-CM

## 2021-04-05 DIAGNOSIS — F51.05 INSOMNIA DUE TO OTHER MENTAL DISORDER: ICD-10-CM

## 2021-04-05 DIAGNOSIS — F99 INSOMNIA DUE TO OTHER MENTAL DISORDER: ICD-10-CM

## 2021-04-05 PROCEDURE — 99215 OFFICE O/P EST HI 40 MIN: CPT | Mod: PBBFAC,PN | Performed by: REGISTERED NURSE

## 2021-04-05 PROCEDURE — 99999 PR PBB SHADOW E&M-EST. PATIENT-LVL V: ICD-10-PCS | Mod: PBBFAC,,, | Performed by: REGISTERED NURSE

## 2021-04-05 PROCEDURE — 99214 PR OFFICE/OUTPT VISIT, EST, LEVL IV, 30-39 MIN: ICD-10-PCS | Mod: S$PBB,,, | Performed by: REGISTERED NURSE

## 2021-04-05 PROCEDURE — 90833 PR PSYCHOTHERAPY W/PATIENT W/E&M, 30 MIN (ADD ON): ICD-10-PCS | Mod: S$PBB,,, | Performed by: REGISTERED NURSE

## 2021-04-05 PROCEDURE — 90833 PSYTX W PT W E/M 30 MIN: CPT | Mod: S$PBB,,, | Performed by: REGISTERED NURSE

## 2021-04-05 PROCEDURE — 99214 OFFICE O/P EST MOD 30 MIN: CPT | Mod: S$PBB,,, | Performed by: REGISTERED NURSE

## 2021-04-05 PROCEDURE — 99999 PR PBB SHADOW E&M-EST. PATIENT-LVL V: CPT | Mod: PBBFAC,,, | Performed by: REGISTERED NURSE

## 2021-04-05 RX ORDER — HALOPERIDOL 2 MG/1
1 TABLET ORAL 2 TIMES DAILY
Qty: 30 TABLET | Refills: 1 | Status: SHIPPED | OUTPATIENT
Start: 2021-04-05 | End: 2021-05-31

## 2021-04-22 ENCOUNTER — OFFICE VISIT (OUTPATIENT)
Dept: ENDOCRINOLOGY | Facility: CLINIC | Age: 66
End: 2021-04-22
Payer: MEDICARE

## 2021-04-22 VITALS
BODY MASS INDEX: 37.93 KG/M2 | RESPIRATION RATE: 20 BRPM | WEIGHT: 206.13 LBS | HEART RATE: 67 BPM | OXYGEN SATURATION: 97 % | SYSTOLIC BLOOD PRESSURE: 140 MMHG | HEIGHT: 62 IN | TEMPERATURE: 98 F | DIASTOLIC BLOOD PRESSURE: 80 MMHG

## 2021-04-22 DIAGNOSIS — E03.9 ACQUIRED HYPOTHYROIDISM: ICD-10-CM

## 2021-04-22 DIAGNOSIS — E11.65 TYPE 2 DIABETES MELLITUS WITH HYPERGLYCEMIA, WITH LONG-TERM CURRENT USE OF INSULIN: Chronic | ICD-10-CM

## 2021-04-22 DIAGNOSIS — E21.3 HYPERPARATHYROIDISM: ICD-10-CM

## 2021-04-22 DIAGNOSIS — I10 ESSENTIAL HYPERTENSION: ICD-10-CM

## 2021-04-22 DIAGNOSIS — E78.2 MIXED HYPERLIPIDEMIA: ICD-10-CM

## 2021-04-22 DIAGNOSIS — E66.01 CLASS 2 SEVERE OBESITY DUE TO EXCESS CALORIES WITH SERIOUS COMORBIDITY AND BODY MASS INDEX (BMI) OF 37.0 TO 37.9 IN ADULT: ICD-10-CM

## 2021-04-22 DIAGNOSIS — M85.859 OSTEOPENIA OF NECK OF FEMUR, UNSPECIFIED LATERALITY: ICD-10-CM

## 2021-04-22 DIAGNOSIS — Z79.4 TYPE 2 DIABETES MELLITUS WITH HYPERGLYCEMIA, WITH LONG-TERM CURRENT USE OF INSULIN: Chronic | ICD-10-CM

## 2021-04-22 PROCEDURE — 99214 OFFICE O/P EST MOD 30 MIN: CPT | Mod: S$PBB,,, | Performed by: INTERNAL MEDICINE

## 2021-04-22 PROCEDURE — 99215 OFFICE O/P EST HI 40 MIN: CPT | Mod: PBBFAC,PO | Performed by: INTERNAL MEDICINE

## 2021-04-22 PROCEDURE — 99999 PR PBB SHADOW E&M-EST. PATIENT-LVL V: CPT | Mod: PBBFAC,,, | Performed by: INTERNAL MEDICINE

## 2021-04-22 PROCEDURE — 99999 PR PBB SHADOW E&M-EST. PATIENT-LVL V: ICD-10-PCS | Mod: PBBFAC,,, | Performed by: INTERNAL MEDICINE

## 2021-04-22 PROCEDURE — 99214 PR OFFICE/OUTPT VISIT, EST, LEVL IV, 30-39 MIN: ICD-10-PCS | Mod: S$PBB,,, | Performed by: INTERNAL MEDICINE

## 2021-04-22 RX ORDER — INSULIN ASPART 100 [IU]/ML
14 INJECTION, SOLUTION INTRAVENOUS; SUBCUTANEOUS
Qty: 45 ML | Refills: 3 | Status: SHIPPED | OUTPATIENT
Start: 2021-04-22 | End: 2021-06-29

## 2021-04-22 RX ORDER — PEN NEEDLE, DIABETIC 30 GX3/16"
NEEDLE, DISPOSABLE MISCELLANEOUS
Qty: 400 EACH | Refills: 3 | Status: SHIPPED | OUTPATIENT
Start: 2021-04-22 | End: 2021-06-29 | Stop reason: SDUPTHER

## 2021-04-22 RX ORDER — INSULIN GLARGINE 100 [IU]/ML
32 INJECTION, SOLUTION SUBCUTANEOUS NIGHTLY
Qty: 45 ML | Refills: 3 | Status: SHIPPED | OUTPATIENT
Start: 2021-04-22 | End: 2021-06-29 | Stop reason: SDUPTHER

## 2021-05-03 ENCOUNTER — OFFICE VISIT (OUTPATIENT)
Dept: PSYCHIATRY | Facility: CLINIC | Age: 66
End: 2021-05-03
Payer: MEDICARE

## 2021-05-03 VITALS
SYSTOLIC BLOOD PRESSURE: 120 MMHG | WEIGHT: 208.31 LBS | DIASTOLIC BLOOD PRESSURE: 79 MMHG | HEIGHT: 62 IN | HEART RATE: 75 BPM | BODY MASS INDEX: 38.33 KG/M2

## 2021-05-03 DIAGNOSIS — F51.05 INSOMNIA DUE TO OTHER MENTAL DISORDER: ICD-10-CM

## 2021-05-03 DIAGNOSIS — F41.9 ANXIETY: Primary | ICD-10-CM

## 2021-05-03 DIAGNOSIS — F99 INSOMNIA DUE TO OTHER MENTAL DISORDER: ICD-10-CM

## 2021-05-03 PROCEDURE — 99214 OFFICE O/P EST MOD 30 MIN: CPT | Mod: S$PBB,,, | Performed by: REGISTERED NURSE

## 2021-05-03 PROCEDURE — 90833 PR PSYCHOTHERAPY W/PATIENT W/E&M, 30 MIN (ADD ON): ICD-10-PCS | Mod: ,,, | Performed by: REGISTERED NURSE

## 2021-05-03 PROCEDURE — 99214 PR OFFICE/OUTPT VISIT, EST, LEVL IV, 30-39 MIN: ICD-10-PCS | Mod: S$PBB,,, | Performed by: REGISTERED NURSE

## 2021-05-03 PROCEDURE — 99999 PR PBB SHADOW E&M-EST. PATIENT-LVL V: CPT | Mod: PBBFAC,,, | Performed by: REGISTERED NURSE

## 2021-05-03 PROCEDURE — 99999 PR PBB SHADOW E&M-EST. PATIENT-LVL V: ICD-10-PCS | Mod: PBBFAC,,, | Performed by: REGISTERED NURSE

## 2021-05-03 PROCEDURE — 90833 PSYTX W PT W E/M 30 MIN: CPT | Mod: ,,, | Performed by: REGISTERED NURSE

## 2021-05-03 PROCEDURE — 99215 OFFICE O/P EST HI 40 MIN: CPT | Mod: PBBFAC,PN | Performed by: REGISTERED NURSE

## 2021-05-03 RX ORDER — BUSPIRONE HYDROCHLORIDE 10 MG/1
TABLET ORAL
Qty: 60 TABLET | Refills: 1 | Status: SHIPPED | OUTPATIENT
Start: 2021-05-03 | End: 2021-07-23

## 2021-05-06 ENCOUNTER — TELEPHONE (OUTPATIENT)
Dept: PSYCHIATRY | Facility: CLINIC | Age: 66
End: 2021-05-06

## 2021-05-19 ENCOUNTER — INFUSION (OUTPATIENT)
Dept: INFUSION THERAPY | Facility: HOSPITAL | Age: 66
End: 2021-05-19
Attending: PSYCHIATRY & NEUROLOGY
Payer: MEDICARE

## 2021-05-19 VITALS
BODY MASS INDEX: 38.39 KG/M2 | HEART RATE: 65 BPM | WEIGHT: 208.63 LBS | OXYGEN SATURATION: 96 % | HEIGHT: 62 IN | SYSTOLIC BLOOD PRESSURE: 139 MMHG | DIASTOLIC BLOOD PRESSURE: 75 MMHG | TEMPERATURE: 97 F | RESPIRATION RATE: 18 BRPM

## 2021-05-19 DIAGNOSIS — G70.00 MYASTHENIA GRAVIS WITHOUT EXACERBATION: Primary | ICD-10-CM

## 2021-05-19 PROCEDURE — 96415 CHEMO IV INFUSION ADDL HR: CPT

## 2021-05-19 PROCEDURE — 96413 CHEMO IV INFUSION 1 HR: CPT

## 2021-05-19 PROCEDURE — 63600175 PHARM REV CODE 636 W HCPCS: Performed by: INTERNAL MEDICINE

## 2021-05-19 PROCEDURE — 63600175 PHARM REV CODE 636 W HCPCS: Mod: JG | Performed by: INTERNAL MEDICINE

## 2021-05-19 PROCEDURE — A4216 STERILE WATER/SALINE, 10 ML: HCPCS | Performed by: INTERNAL MEDICINE

## 2021-05-19 PROCEDURE — 25000003 PHARM REV CODE 250: Performed by: INTERNAL MEDICINE

## 2021-05-19 RX ORDER — HEPARIN 100 UNIT/ML
500 SYRINGE INTRAVENOUS
Status: CANCELLED
Start: 2021-05-19

## 2021-05-19 RX ORDER — SODIUM CHLORIDE 0.9 % (FLUSH) 0.9 %
10 SYRINGE (ML) INJECTION
Status: CANCELLED
Start: 2021-05-19

## 2021-05-19 RX ORDER — SODIUM CHLORIDE 0.9 % (FLUSH) 0.9 %
10 SYRINGE (ML) INJECTION
Status: DISCONTINUED | OUTPATIENT
Start: 2021-05-19 | End: 2021-05-19 | Stop reason: HOSPADM

## 2021-05-19 RX ORDER — ACETAMINOPHEN 325 MG/1
325 TABLET ORAL
Status: CANCELLED
Start: 2021-05-19

## 2021-05-19 RX ORDER — HEPARIN 100 UNIT/ML
500 SYRINGE INTRAVENOUS
Status: COMPLETED | OUTPATIENT
Start: 2021-05-19 | End: 2021-05-19

## 2021-05-19 RX ORDER — ACETAMINOPHEN 325 MG/1
325 TABLET ORAL
Status: COMPLETED | OUTPATIENT
Start: 2021-05-19 | End: 2021-05-19

## 2021-05-19 RX ADMIN — HEPARIN 500 UNITS: 100 SYRINGE at 12:05

## 2021-05-19 RX ADMIN — HUMAN IMMUNOGLOBULIN G 40 G: 40 LIQUID INTRAVENOUS at 09:05

## 2021-05-19 RX ADMIN — ACETAMINOPHEN 325 MG: 325 TABLET ORAL at 11:05

## 2021-05-19 RX ADMIN — SODIUM CHLORIDE, PRESERVATIVE FREE 10 ML: 5 INJECTION INTRAVENOUS at 12:05

## 2021-05-20 ENCOUNTER — INFUSION (OUTPATIENT)
Dept: INFUSION THERAPY | Facility: HOSPITAL | Age: 66
End: 2021-05-20
Attending: PSYCHIATRY & NEUROLOGY
Payer: MEDICARE

## 2021-05-20 VITALS
BODY MASS INDEX: 38.09 KG/M2 | HEART RATE: 52 BPM | HEIGHT: 62 IN | OXYGEN SATURATION: 97 % | RESPIRATION RATE: 18 BRPM | WEIGHT: 207 LBS | DIASTOLIC BLOOD PRESSURE: 76 MMHG | TEMPERATURE: 98 F | SYSTOLIC BLOOD PRESSURE: 140 MMHG

## 2021-05-20 DIAGNOSIS — G70.00 MYASTHENIA GRAVIS WITHOUT EXACERBATION: Primary | ICD-10-CM

## 2021-05-20 PROCEDURE — 96367 TX/PROPH/DG ADDL SEQ IV INF: CPT

## 2021-05-20 PROCEDURE — 25000003 PHARM REV CODE 250: Performed by: INTERNAL MEDICINE

## 2021-05-20 PROCEDURE — 63600175 PHARM REV CODE 636 W HCPCS: Mod: JG | Performed by: INTERNAL MEDICINE

## 2021-05-20 PROCEDURE — 63600175 PHARM REV CODE 636 W HCPCS: Performed by: INTERNAL MEDICINE

## 2021-05-20 PROCEDURE — A4216 STERILE WATER/SALINE, 10 ML: HCPCS | Performed by: INTERNAL MEDICINE

## 2021-05-20 PROCEDURE — 96413 CHEMO IV INFUSION 1 HR: CPT

## 2021-05-20 PROCEDURE — 96415 CHEMO IV INFUSION ADDL HR: CPT

## 2021-05-20 RX ORDER — HEPARIN 100 UNIT/ML
500 SYRINGE INTRAVENOUS
Status: DISCONTINUED | OUTPATIENT
Start: 2021-05-20 | End: 2021-05-20 | Stop reason: HOSPADM

## 2021-05-20 RX ORDER — ACETAMINOPHEN 325 MG/1
325 TABLET ORAL
Status: CANCELLED
Start: 2021-05-20

## 2021-05-20 RX ORDER — HEPARIN 100 UNIT/ML
500 SYRINGE INTRAVENOUS
Status: CANCELLED
Start: 2021-05-20

## 2021-05-20 RX ORDER — ACETAMINOPHEN 325 MG/1
325 TABLET ORAL
Status: COMPLETED | OUTPATIENT
Start: 2021-05-20 | End: 2021-05-20

## 2021-05-20 RX ORDER — SODIUM CHLORIDE 0.9 % (FLUSH) 0.9 %
10 SYRINGE (ML) INJECTION
Status: CANCELLED
Start: 2021-05-20

## 2021-05-20 RX ORDER — SODIUM CHLORIDE 0.9 % (FLUSH) 0.9 %
10 SYRINGE (ML) INJECTION
Status: DISCONTINUED | OUTPATIENT
Start: 2021-05-20 | End: 2021-05-20 | Stop reason: HOSPADM

## 2021-05-20 RX ADMIN — HUMAN IMMUNOGLOBULIN G 40 G: 40 LIQUID INTRAVENOUS at 07:05

## 2021-05-20 RX ADMIN — DIPHENHYDRAMINE HYDROCHLORIDE 25 MG: 50 INJECTION INTRAMUSCULAR; INTRAVENOUS at 07:05

## 2021-05-20 RX ADMIN — SODIUM CHLORIDE, PRESERVATIVE FREE 10 ML: 5 INJECTION INTRAVENOUS at 10:05

## 2021-05-20 RX ADMIN — ACETAMINOPHEN 325 MG: 325 TABLET ORAL at 07:05

## 2021-05-21 ENCOUNTER — INFUSION (OUTPATIENT)
Dept: INFUSION THERAPY | Facility: HOSPITAL | Age: 66
End: 2021-05-21
Attending: PSYCHIATRY & NEUROLOGY
Payer: MEDICARE

## 2021-05-21 VITALS
HEART RATE: 67 BPM | OXYGEN SATURATION: 96 % | RESPIRATION RATE: 18 BRPM | SYSTOLIC BLOOD PRESSURE: 126 MMHG | WEIGHT: 207 LBS | BODY MASS INDEX: 38.09 KG/M2 | TEMPERATURE: 98 F | DIASTOLIC BLOOD PRESSURE: 79 MMHG | HEIGHT: 62 IN

## 2021-05-21 DIAGNOSIS — G70.00 MYASTHENIA GRAVIS WITHOUT EXACERBATION: Primary | ICD-10-CM

## 2021-05-21 PROCEDURE — 63600175 PHARM REV CODE 636 W HCPCS: Performed by: INTERNAL MEDICINE

## 2021-05-21 PROCEDURE — 96367 TX/PROPH/DG ADDL SEQ IV INF: CPT

## 2021-05-21 PROCEDURE — 63600175 PHARM REV CODE 636 W HCPCS: Mod: JG | Performed by: INTERNAL MEDICINE

## 2021-05-21 PROCEDURE — 96415 CHEMO IV INFUSION ADDL HR: CPT

## 2021-05-21 PROCEDURE — A4216 STERILE WATER/SALINE, 10 ML: HCPCS | Performed by: INTERNAL MEDICINE

## 2021-05-21 PROCEDURE — 96413 CHEMO IV INFUSION 1 HR: CPT

## 2021-05-21 PROCEDURE — 25000003 PHARM REV CODE 250: Performed by: INTERNAL MEDICINE

## 2021-05-21 RX ORDER — HEPARIN 100 UNIT/ML
500 SYRINGE INTRAVENOUS
Status: CANCELLED
Start: 2021-05-21

## 2021-05-21 RX ORDER — ACETAMINOPHEN 325 MG/1
325 TABLET ORAL
Status: COMPLETED | OUTPATIENT
Start: 2021-05-21 | End: 2021-05-21

## 2021-05-21 RX ORDER — SODIUM CHLORIDE 0.9 % (FLUSH) 0.9 %
10 SYRINGE (ML) INJECTION
Status: CANCELLED
Start: 2021-05-21

## 2021-05-21 RX ORDER — ACETAMINOPHEN 325 MG/1
325 TABLET ORAL
Status: CANCELLED
Start: 2021-05-21

## 2021-05-21 RX ORDER — SODIUM CHLORIDE 0.9 % (FLUSH) 0.9 %
10 SYRINGE (ML) INJECTION
Status: DISCONTINUED | OUTPATIENT
Start: 2021-05-21 | End: 2021-05-21 | Stop reason: HOSPADM

## 2021-05-21 RX ORDER — HEPARIN 100 UNIT/ML
500 SYRINGE INTRAVENOUS
Status: DISCONTINUED | OUTPATIENT
Start: 2021-05-21 | End: 2021-05-21 | Stop reason: HOSPADM

## 2021-05-21 RX ADMIN — ACETAMINOPHEN 325 MG: 325 TABLET ORAL at 10:05

## 2021-05-21 RX ADMIN — SODIUM CHLORIDE, PRESERVATIVE FREE 10 ML: 5 INJECTION INTRAVENOUS at 01:05

## 2021-05-21 RX ADMIN — DIPHENHYDRAMINE HYDROCHLORIDE 25 MG: 50 INJECTION INTRAMUSCULAR; INTRAVENOUS at 10:05

## 2021-05-21 RX ADMIN — HUMAN IMMUNOGLOBULIN G 40 G: 40 LIQUID INTRAVENOUS at 10:05

## 2021-05-24 ENCOUNTER — INFUSION (OUTPATIENT)
Dept: INFUSION THERAPY | Facility: HOSPITAL | Age: 66
End: 2021-05-24
Attending: PSYCHIATRY & NEUROLOGY
Payer: MEDICARE

## 2021-05-24 VITALS
HEART RATE: 68 BPM | SYSTOLIC BLOOD PRESSURE: 150 MMHG | DIASTOLIC BLOOD PRESSURE: 78 MMHG | RESPIRATION RATE: 18 BRPM | WEIGHT: 210 LBS | BODY MASS INDEX: 38.64 KG/M2 | TEMPERATURE: 98 F | HEIGHT: 62 IN | OXYGEN SATURATION: 98 %

## 2021-05-24 DIAGNOSIS — G70.00 MYASTHENIA GRAVIS WITHOUT EXACERBATION: Primary | ICD-10-CM

## 2021-05-24 PROCEDURE — 25000003 PHARM REV CODE 250: Performed by: INTERNAL MEDICINE

## 2021-05-24 PROCEDURE — 96415 CHEMO IV INFUSION ADDL HR: CPT

## 2021-05-24 PROCEDURE — 96413 CHEMO IV INFUSION 1 HR: CPT

## 2021-05-24 PROCEDURE — 63600175 PHARM REV CODE 636 W HCPCS: Mod: JG | Performed by: INTERNAL MEDICINE

## 2021-05-24 PROCEDURE — 96367 TX/PROPH/DG ADDL SEQ IV INF: CPT

## 2021-05-24 PROCEDURE — 63600175 PHARM REV CODE 636 W HCPCS: Performed by: INTERNAL MEDICINE

## 2021-05-24 PROCEDURE — A4216 STERILE WATER/SALINE, 10 ML: HCPCS | Performed by: INTERNAL MEDICINE

## 2021-05-24 RX ORDER — SODIUM CHLORIDE 0.9 % (FLUSH) 0.9 %
10 SYRINGE (ML) INJECTION
Status: DISCONTINUED | OUTPATIENT
Start: 2021-05-24 | End: 2021-05-24 | Stop reason: HOSPADM

## 2021-05-24 RX ORDER — HEPARIN 100 UNIT/ML
500 SYRINGE INTRAVENOUS
Status: CANCELLED
Start: 2021-05-24

## 2021-05-24 RX ORDER — ACETAMINOPHEN 325 MG/1
325 TABLET ORAL
Status: COMPLETED | OUTPATIENT
Start: 2021-05-24 | End: 2021-05-24

## 2021-05-24 RX ORDER — SODIUM CHLORIDE 0.9 % (FLUSH) 0.9 %
10 SYRINGE (ML) INJECTION
Status: CANCELLED
Start: 2021-05-24

## 2021-05-24 RX ORDER — ACETAMINOPHEN 325 MG/1
325 TABLET ORAL
Status: CANCELLED
Start: 2021-05-24

## 2021-05-24 RX ADMIN — SODIUM CHLORIDE, PRESERVATIVE FREE 10 ML: 5 INJECTION INTRAVENOUS at 12:05

## 2021-05-24 RX ADMIN — HUMAN IMMUNOGLOBULIN G 40 G: 40 LIQUID INTRAVENOUS at 09:05

## 2021-05-24 RX ADMIN — ACETAMINOPHEN 325 MG: 325 TABLET ORAL at 08:05

## 2021-05-24 RX ADMIN — DIPHENHYDRAMINE HYDROCHLORIDE 25 MG: 50 INJECTION INTRAMUSCULAR; INTRAVENOUS at 08:05

## 2021-05-25 ENCOUNTER — INFUSION (OUTPATIENT)
Dept: INFUSION THERAPY | Facility: HOSPITAL | Age: 66
End: 2021-05-25
Attending: PSYCHIATRY & NEUROLOGY
Payer: MEDICARE

## 2021-05-25 VITALS
HEART RATE: 78 BPM | SYSTOLIC BLOOD PRESSURE: 150 MMHG | BODY MASS INDEX: 38.8 KG/M2 | OXYGEN SATURATION: 98 % | WEIGHT: 210.88 LBS | HEIGHT: 62 IN | TEMPERATURE: 98 F | DIASTOLIC BLOOD PRESSURE: 82 MMHG | RESPIRATION RATE: 18 BRPM

## 2021-05-25 DIAGNOSIS — G70.00 MYASTHENIA GRAVIS WITHOUT EXACERBATION: Primary | ICD-10-CM

## 2021-05-25 PROCEDURE — 25000003 PHARM REV CODE 250: Performed by: INTERNAL MEDICINE

## 2021-05-25 PROCEDURE — 63600175 PHARM REV CODE 636 W HCPCS: Performed by: INTERNAL MEDICINE

## 2021-05-25 PROCEDURE — 96367 TX/PROPH/DG ADDL SEQ IV INF: CPT

## 2021-05-25 PROCEDURE — A4216 STERILE WATER/SALINE, 10 ML: HCPCS | Performed by: INTERNAL MEDICINE

## 2021-05-25 PROCEDURE — 96413 CHEMO IV INFUSION 1 HR: CPT

## 2021-05-25 PROCEDURE — 96415 CHEMO IV INFUSION ADDL HR: CPT

## 2021-05-25 PROCEDURE — 63600175 PHARM REV CODE 636 W HCPCS: Mod: JG | Performed by: INTERNAL MEDICINE

## 2021-05-25 RX ORDER — ACETAMINOPHEN 325 MG/1
325 TABLET ORAL
Status: CANCELLED
Start: 2021-05-25

## 2021-05-25 RX ORDER — SODIUM CHLORIDE 0.9 % (FLUSH) 0.9 %
10 SYRINGE (ML) INJECTION
Status: CANCELLED
Start: 2021-05-25

## 2021-05-25 RX ORDER — HEPARIN 100 UNIT/ML
500 SYRINGE INTRAVENOUS
Status: CANCELLED
Start: 2021-05-25

## 2021-05-25 RX ORDER — ACETAMINOPHEN 325 MG/1
325 TABLET ORAL
Status: COMPLETED | OUTPATIENT
Start: 2021-05-25 | End: 2021-05-25

## 2021-05-25 RX ORDER — SODIUM CHLORIDE 0.9 % (FLUSH) 0.9 %
10 SYRINGE (ML) INJECTION
Status: DISCONTINUED | OUTPATIENT
Start: 2021-05-25 | End: 2021-05-25 | Stop reason: HOSPADM

## 2021-05-25 RX ADMIN — HUMAN IMMUNOGLOBULIN G 40 G: 40 LIQUID INTRAVENOUS at 09:05

## 2021-05-25 RX ADMIN — SODIUM CHLORIDE, PRESERVATIVE FREE 10 ML: 5 INJECTION INTRAVENOUS at 12:05

## 2021-05-25 RX ADMIN — DIPHENHYDRAMINE HYDROCHLORIDE 25 MG: 50 INJECTION INTRAMUSCULAR; INTRAVENOUS at 09:05

## 2021-05-25 RX ADMIN — ACETAMINOPHEN 325 MG: 325 TABLET ORAL at 09:05

## 2021-05-31 ENCOUNTER — OFFICE VISIT (OUTPATIENT)
Dept: PSYCHIATRY | Facility: CLINIC | Age: 66
End: 2021-05-31
Payer: MEDICARE

## 2021-05-31 VITALS
WEIGHT: 211.19 LBS | SYSTOLIC BLOOD PRESSURE: 156 MMHG | HEIGHT: 62 IN | HEART RATE: 64 BPM | DIASTOLIC BLOOD PRESSURE: 85 MMHG | BODY MASS INDEX: 38.86 KG/M2

## 2021-05-31 DIAGNOSIS — F41.9 ANXIETY: Primary | ICD-10-CM

## 2021-05-31 DIAGNOSIS — F23 BRIEF PSYCHOTIC DISORDER: ICD-10-CM

## 2021-05-31 PROCEDURE — 90833 PSYTX W PT W E/M 30 MIN: CPT | Mod: ,,, | Performed by: REGISTERED NURSE

## 2021-05-31 PROCEDURE — 99999 PR PBB SHADOW E&M-EST. PATIENT-LVL V: ICD-10-PCS | Mod: PBBFAC,,, | Performed by: REGISTERED NURSE

## 2021-05-31 PROCEDURE — 99999 PR PBB SHADOW E&M-EST. PATIENT-LVL V: CPT | Mod: PBBFAC,,, | Performed by: REGISTERED NURSE

## 2021-05-31 PROCEDURE — 99215 OFFICE O/P EST HI 40 MIN: CPT | Mod: PBBFAC,PN | Performed by: REGISTERED NURSE

## 2021-05-31 PROCEDURE — 99214 PR OFFICE/OUTPT VISIT, EST, LEVL IV, 30-39 MIN: ICD-10-PCS | Mod: S$PBB,,, | Performed by: REGISTERED NURSE

## 2021-05-31 PROCEDURE — 90833 PR PSYCHOTHERAPY W/PATIENT W/E&M, 30 MIN (ADD ON): ICD-10-PCS | Mod: ,,, | Performed by: REGISTERED NURSE

## 2021-05-31 PROCEDURE — 99214 OFFICE O/P EST MOD 30 MIN: CPT | Mod: S$PBB,,, | Performed by: REGISTERED NURSE

## 2021-06-21 ENCOUNTER — TELEPHONE (OUTPATIENT)
Dept: INFUSION THERAPY | Facility: HOSPITAL | Age: 66
End: 2021-06-21

## 2021-06-29 ENCOUNTER — OFFICE VISIT (OUTPATIENT)
Dept: ENDOCRINOLOGY | Facility: CLINIC | Age: 66
End: 2021-06-29
Payer: MEDICARE

## 2021-06-29 VITALS
WEIGHT: 209.31 LBS | HEART RATE: 68 BPM | OXYGEN SATURATION: 97 % | HEIGHT: 62 IN | SYSTOLIC BLOOD PRESSURE: 126 MMHG | BODY MASS INDEX: 38.52 KG/M2 | DIASTOLIC BLOOD PRESSURE: 68 MMHG

## 2021-06-29 DIAGNOSIS — E03.9 ACQUIRED HYPOTHYROIDISM: ICD-10-CM

## 2021-06-29 DIAGNOSIS — E11.65 TYPE 2 DIABETES MELLITUS WITH HYPERGLYCEMIA, WITH LONG-TERM CURRENT USE OF INSULIN: ICD-10-CM

## 2021-06-29 DIAGNOSIS — E11.65 TYPE 2 DIABETES MELLITUS WITH HYPERGLYCEMIA, WITH LONG-TERM CURRENT USE OF INSULIN: Primary | ICD-10-CM

## 2021-06-29 DIAGNOSIS — E78.2 MIXED HYPERLIPIDEMIA: ICD-10-CM

## 2021-06-29 DIAGNOSIS — Z79.4 TYPE 2 DIABETES MELLITUS WITH HYPERGLYCEMIA, WITH LONG-TERM CURRENT USE OF INSULIN: ICD-10-CM

## 2021-06-29 DIAGNOSIS — E55.9 HYPOVITAMINOSIS D: ICD-10-CM

## 2021-06-29 DIAGNOSIS — M85.9 DISORDER OF BONE DENSITY AND STRUCTURE, UNSPECIFIED: ICD-10-CM

## 2021-06-29 DIAGNOSIS — Z79.4 TYPE 2 DIABETES MELLITUS WITH HYPERGLYCEMIA, WITH LONG-TERM CURRENT USE OF INSULIN: Primary | ICD-10-CM

## 2021-06-29 DIAGNOSIS — M85.859 OSTEOPENIA OF NECK OF FEMUR, UNSPECIFIED LATERALITY: ICD-10-CM

## 2021-06-29 DIAGNOSIS — I10 ESSENTIAL HYPERTENSION: ICD-10-CM

## 2021-06-29 PROCEDURE — 99999 PR PBB SHADOW E&M-EST. PATIENT-LVL III: ICD-10-PCS | Mod: PBBFAC,,, | Performed by: INTERNAL MEDICINE

## 2021-06-29 PROCEDURE — 99213 OFFICE O/P EST LOW 20 MIN: CPT | Mod: PBBFAC,PO | Performed by: INTERNAL MEDICINE

## 2021-06-29 PROCEDURE — 99999 PR PBB SHADOW E&M-EST. PATIENT-LVL III: CPT | Mod: PBBFAC,,, | Performed by: INTERNAL MEDICINE

## 2021-06-29 PROCEDURE — 99215 PR OFFICE/OUTPT VISIT, EST, LEVL V, 40-54 MIN: ICD-10-PCS | Mod: S$PBB,,, | Performed by: INTERNAL MEDICINE

## 2021-06-29 PROCEDURE — 99215 OFFICE O/P EST HI 40 MIN: CPT | Mod: S$PBB,,, | Performed by: INTERNAL MEDICINE

## 2021-06-29 RX ORDER — INSULIN GLARGINE 100 [IU]/ML
34 INJECTION, SOLUTION SUBCUTANEOUS NIGHTLY
Qty: 45 ML | Refills: 5 | Status: SHIPPED | OUTPATIENT
Start: 2021-06-29 | End: 2021-06-29 | Stop reason: SDUPTHER

## 2021-06-29 RX ORDER — LANCETS 33 GAUGE
1 EACH MISCELLANEOUS DAILY
Qty: 400 EACH | Refills: 3 | Status: SHIPPED | OUTPATIENT
Start: 2021-06-29 | End: 2022-05-13 | Stop reason: SDUPTHER

## 2021-06-29 RX ORDER — ATORVASTATIN CALCIUM 80 MG/1
80 TABLET, FILM COATED ORAL DAILY
Qty: 90 TABLET | Refills: 3 | Status: SHIPPED | OUTPATIENT
Start: 2021-06-29 | End: 2021-06-29 | Stop reason: SDUPTHER

## 2021-06-29 RX ORDER — PEN NEEDLE, DIABETIC 30 GX3/16"
NEEDLE, DISPOSABLE MISCELLANEOUS
Qty: 400 EACH | Refills: 3 | Status: SHIPPED | OUTPATIENT
Start: 2021-06-29 | End: 2021-06-29 | Stop reason: SDUPTHER

## 2021-06-29 RX ORDER — INSULIN ASPART 100 [IU]/ML
INJECTION, SOLUTION INTRAVENOUS; SUBCUTANEOUS
Qty: 45 ML | Refills: 3 | Status: SHIPPED | OUTPATIENT
Start: 2021-06-29 | End: 2021-06-29 | Stop reason: SDUPTHER

## 2021-06-29 RX ORDER — INSULIN PUMP SYRINGE, 3 ML
EACH MISCELLANEOUS
Qty: 1 EACH | Refills: 1 | Status: SHIPPED | OUTPATIENT
Start: 2021-06-29 | End: 2023-03-16 | Stop reason: SDUPTHER

## 2021-06-30 ENCOUNTER — OFFICE VISIT (OUTPATIENT)
Dept: PSYCHIATRY | Facility: CLINIC | Age: 66
End: 2021-06-30
Payer: MEDICARE

## 2021-06-30 DIAGNOSIS — F41.9 ANXIETY: ICD-10-CM

## 2021-06-30 DIAGNOSIS — F23 BRIEF PSYCHOTIC DISORDER: Primary | ICD-10-CM

## 2021-06-30 PROCEDURE — 99214 PR OFFICE/OUTPT VISIT, EST, LEVL IV, 30-39 MIN: ICD-10-PCS | Mod: 95,,, | Performed by: REGISTERED NURSE

## 2021-06-30 PROCEDURE — 99214 OFFICE O/P EST MOD 30 MIN: CPT | Mod: 95,,, | Performed by: REGISTERED NURSE

## 2021-06-30 RX ORDER — INSULIN PUMP SYRINGE, 3 ML
EACH MISCELLANEOUS
Qty: 1 EACH | Refills: 0 | Status: SHIPPED | OUTPATIENT
Start: 2021-06-30 | End: 2022-05-17 | Stop reason: SDUPTHER

## 2021-06-30 RX ORDER — LANCETS
EACH MISCELLANEOUS
Qty: 100 EACH | Refills: 11 | Status: SHIPPED | OUTPATIENT
Start: 2021-06-30 | End: 2022-05-17 | Stop reason: SDUPTHER

## 2021-06-30 RX ORDER — INSULIN ASPART 100 [IU]/ML
INJECTION, SOLUTION INTRAVENOUS; SUBCUTANEOUS
Qty: 45 ML | Refills: 3 | Status: SHIPPED | OUTPATIENT
Start: 2021-06-30 | End: 2021-07-07

## 2021-06-30 RX ORDER — HALOPERIDOL 0.5 MG/1
0.25 TABLET ORAL NIGHTLY
Qty: 15 TABLET | Refills: 1 | Status: SHIPPED | OUTPATIENT
Start: 2021-06-30 | End: 2021-07-23

## 2021-06-30 RX ORDER — ERGOCALCIFEROL 1.25 MG/1
CAPSULE ORAL
Qty: 12 CAPSULE | Refills: 1 | Status: SHIPPED | OUTPATIENT
Start: 2021-06-30 | End: 2021-12-20

## 2021-06-30 RX ORDER — PEN NEEDLE, DIABETIC 30 GX3/16"
NEEDLE, DISPOSABLE MISCELLANEOUS
Qty: 400 EACH | Refills: 3 | Status: SHIPPED | OUTPATIENT
Start: 2021-06-30 | End: 2022-06-30 | Stop reason: SDUPTHER

## 2021-06-30 RX ORDER — INSULIN GLARGINE 100 [IU]/ML
34 INJECTION, SOLUTION SUBCUTANEOUS NIGHTLY
Qty: 45 ML | Refills: 5 | Status: SHIPPED | OUTPATIENT
Start: 2021-06-30 | End: 2021-07-07

## 2021-06-30 RX ORDER — ATORVASTATIN CALCIUM 80 MG/1
80 TABLET, FILM COATED ORAL DAILY
Qty: 90 TABLET | Refills: 3 | Status: SHIPPED | OUTPATIENT
Start: 2021-06-30 | End: 2022-06-29

## 2021-07-07 RX ORDER — INSULIN GLARGINE 100 [IU]/ML
INJECTION, SOLUTION SUBCUTANEOUS
Qty: 45 ML | Refills: 3 | Status: SHIPPED | OUTPATIENT
Start: 2021-07-07 | End: 2021-08-03 | Stop reason: SDUPTHER

## 2021-07-07 RX ORDER — INSULIN LISPRO 100 [IU]/ML
INJECTION, SOLUTION INTRAVENOUS; SUBCUTANEOUS
Qty: 45 ML | Refills: 3 | Status: SHIPPED | OUTPATIENT
Start: 2021-07-07 | End: 2021-08-25

## 2021-07-08 ENCOUNTER — TELEPHONE (OUTPATIENT)
Dept: PSYCHIATRY | Facility: CLINIC | Age: 66
End: 2021-07-08

## 2021-07-09 ENCOUNTER — TELEPHONE (OUTPATIENT)
Dept: ENDOCRINOLOGY | Facility: CLINIC | Age: 66
End: 2021-07-09

## 2021-07-12 ENCOUNTER — TELEPHONE (OUTPATIENT)
Dept: INFUSION THERAPY | Facility: HOSPITAL | Age: 66
End: 2021-07-12

## 2021-07-12 ENCOUNTER — TELEPHONE (OUTPATIENT)
Dept: PSYCHIATRY | Facility: CLINIC | Age: 66
End: 2021-07-12

## 2021-07-19 ENCOUNTER — TELEPHONE (OUTPATIENT)
Dept: INFUSION THERAPY | Facility: HOSPITAL | Age: 66
End: 2021-07-19

## 2021-07-23 ENCOUNTER — OFFICE VISIT (OUTPATIENT)
Dept: PSYCHIATRY | Facility: CLINIC | Age: 66
End: 2021-07-23
Payer: MEDICARE

## 2021-07-23 VITALS
BODY MASS INDEX: 39.15 KG/M2 | HEART RATE: 70 BPM | SYSTOLIC BLOOD PRESSURE: 136 MMHG | WEIGHT: 212.75 LBS | HEIGHT: 62 IN | DIASTOLIC BLOOD PRESSURE: 86 MMHG

## 2021-07-23 DIAGNOSIS — F41.9 ANXIETY: ICD-10-CM

## 2021-07-23 DIAGNOSIS — F22 DELUSIONAL DISORDER: Primary | ICD-10-CM

## 2021-07-23 DIAGNOSIS — F51.05 INSOMNIA DUE TO OTHER MENTAL DISORDER: ICD-10-CM

## 2021-07-23 DIAGNOSIS — F99 INSOMNIA DUE TO OTHER MENTAL DISORDER: ICD-10-CM

## 2021-07-23 PROCEDURE — 99999 PR PBB SHADOW E&M-EST. PATIENT-LVL V: CPT | Mod: PBBFAC,,, | Performed by: REGISTERED NURSE

## 2021-07-23 PROCEDURE — 99214 OFFICE O/P EST MOD 30 MIN: CPT | Mod: S$PBB,,, | Performed by: REGISTERED NURSE

## 2021-07-23 PROCEDURE — 99215 OFFICE O/P EST HI 40 MIN: CPT | Mod: PBBFAC,PN | Performed by: REGISTERED NURSE

## 2021-07-23 PROCEDURE — 99999 PR PBB SHADOW E&M-EST. PATIENT-LVL V: ICD-10-PCS | Mod: PBBFAC,,, | Performed by: REGISTERED NURSE

## 2021-07-23 PROCEDURE — 99214 PR OFFICE/OUTPT VISIT, EST, LEVL IV, 30-39 MIN: ICD-10-PCS | Mod: S$PBB,,, | Performed by: REGISTERED NURSE

## 2021-07-23 RX ORDER — RISPERIDONE 1 MG/1
1 TABLET ORAL NIGHTLY
COMMUNITY
Start: 2021-07-21 | End: 2021-08-27 | Stop reason: SDUPTHER

## 2021-07-23 RX ORDER — TRAZODONE HYDROCHLORIDE 50 MG/1
50 TABLET ORAL NIGHTLY
COMMUNITY
Start: 2021-07-21 | End: 2021-08-27

## 2021-07-26 ENCOUNTER — TELEPHONE (OUTPATIENT)
Dept: INFUSION THERAPY | Facility: HOSPITAL | Age: 66
End: 2021-07-26

## 2021-07-26 ENCOUNTER — DOCUMENTATION ONLY (OUTPATIENT)
Dept: INFUSION THERAPY | Facility: HOSPITAL | Age: 66
End: 2021-07-26

## 2021-08-03 ENCOUNTER — TELEPHONE (OUTPATIENT)
Dept: PSYCHIATRY | Facility: CLINIC | Age: 66
End: 2021-08-03

## 2021-08-03 DIAGNOSIS — Z79.4 TYPE 2 DIABETES MELLITUS WITH HYPERGLYCEMIA, WITH LONG-TERM CURRENT USE OF INSULIN: ICD-10-CM

## 2021-08-03 DIAGNOSIS — E11.65 TYPE 2 DIABETES MELLITUS WITH HYPERGLYCEMIA, WITH LONG-TERM CURRENT USE OF INSULIN: ICD-10-CM

## 2021-08-04 DIAGNOSIS — G70.00 MYASTHENIA GRAVIS WITHOUT EXACERBATION: Primary | Chronic | ICD-10-CM

## 2021-08-04 RX ORDER — INSULIN GLARGINE 100 [IU]/ML
INJECTION, SOLUTION SUBCUTANEOUS
Qty: 45 ML | Refills: 3 | Status: SHIPPED | OUTPATIENT
Start: 2021-08-04 | End: 2021-08-25 | Stop reason: SDUPTHER

## 2021-08-06 ENCOUNTER — OFFICE VISIT (OUTPATIENT)
Dept: PSYCHIATRY | Facility: CLINIC | Age: 66
End: 2021-08-06
Payer: MEDICARE

## 2021-08-06 DIAGNOSIS — F41.9 ANXIETY: ICD-10-CM

## 2021-08-06 DIAGNOSIS — F51.05 INSOMNIA DUE TO OTHER MENTAL DISORDER: ICD-10-CM

## 2021-08-06 DIAGNOSIS — F99 INSOMNIA DUE TO OTHER MENTAL DISORDER: ICD-10-CM

## 2021-08-06 DIAGNOSIS — F22 DELUSIONAL DISORDER: Primary | ICD-10-CM

## 2021-08-06 PROCEDURE — 99215 OFFICE O/P EST HI 40 MIN: CPT | Mod: 95,,, | Performed by: REGISTERED NURSE

## 2021-08-06 PROCEDURE — 99215 PR OFFICE/OUTPT VISIT, EST, LEVL V, 40-54 MIN: ICD-10-PCS | Mod: 95,,, | Performed by: REGISTERED NURSE

## 2021-08-09 ENCOUNTER — TELEPHONE (OUTPATIENT)
Dept: ENDOCRINOLOGY | Facility: CLINIC | Age: 66
End: 2021-08-09

## 2021-08-09 DIAGNOSIS — Z79.4 TYPE 2 DIABETES MELLITUS WITH HYPERGLYCEMIA, WITH LONG-TERM CURRENT USE OF INSULIN: Primary | ICD-10-CM

## 2021-08-09 DIAGNOSIS — E11.65 TYPE 2 DIABETES MELLITUS WITH HYPERGLYCEMIA, WITH LONG-TERM CURRENT USE OF INSULIN: Primary | ICD-10-CM

## 2021-08-10 RX ORDER — SAXAGLIPTIN 5 MG/1
5 TABLET, FILM COATED ORAL DAILY
Qty: 60 TABLET | Refills: 6 | Status: SHIPPED | OUTPATIENT
Start: 2021-08-10 | End: 2021-08-25

## 2021-08-10 RX ORDER — SAXAGLIPTIN 5 MG/1
5 TABLET, FILM COATED ORAL DAILY
Qty: 60 TABLET | Refills: 6 | Status: SHIPPED | OUTPATIENT
Start: 2021-08-10 | End: 2021-08-10 | Stop reason: SDUPTHER

## 2021-08-13 ENCOUNTER — TELEPHONE (OUTPATIENT)
Dept: ENDOCRINOLOGY | Facility: CLINIC | Age: 66
End: 2021-08-13

## 2021-08-16 ENCOUNTER — DOCUMENTATION ONLY (OUTPATIENT)
Dept: INFUSION THERAPY | Facility: HOSPITAL | Age: 66
End: 2021-08-16

## 2021-08-16 ENCOUNTER — TELEPHONE (OUTPATIENT)
Dept: INFUSION THERAPY | Facility: HOSPITAL | Age: 66
End: 2021-08-16

## 2021-08-20 ENCOUNTER — OFFICE VISIT (OUTPATIENT)
Dept: PSYCHIATRY | Facility: CLINIC | Age: 66
End: 2021-08-20
Payer: MEDICARE

## 2021-08-20 DIAGNOSIS — F22 DELUSIONAL DISORDER: ICD-10-CM

## 2021-08-20 PROCEDURE — 90791 PR PSYCHIATRIC DIAGNOSTIC EVALUATION: ICD-10-PCS | Mod: ,,, | Performed by: SOCIAL WORKER

## 2021-08-20 PROCEDURE — 99999 PR PBB SHADOW E&M-EST. PATIENT-LVL III: ICD-10-PCS | Mod: PBBFAC,,, | Performed by: SOCIAL WORKER

## 2021-08-20 PROCEDURE — 90791 PSYCH DIAGNOSTIC EVALUATION: CPT | Mod: ,,, | Performed by: SOCIAL WORKER

## 2021-08-20 PROCEDURE — 99213 OFFICE O/P EST LOW 20 MIN: CPT | Mod: PBBFAC,PN | Performed by: SOCIAL WORKER

## 2021-08-20 PROCEDURE — 99999 PR PBB SHADOW E&M-EST. PATIENT-LVL III: CPT | Mod: PBBFAC,,, | Performed by: SOCIAL WORKER

## 2021-08-25 ENCOUNTER — OFFICE VISIT (OUTPATIENT)
Dept: ENDOCRINOLOGY | Facility: CLINIC | Age: 66
End: 2021-08-25
Payer: MEDICARE

## 2021-08-25 VITALS
BODY MASS INDEX: 38.83 KG/M2 | OXYGEN SATURATION: 97 % | WEIGHT: 211 LBS | DIASTOLIC BLOOD PRESSURE: 72 MMHG | HEIGHT: 62 IN | SYSTOLIC BLOOD PRESSURE: 126 MMHG | HEART RATE: 70 BPM

## 2021-08-25 DIAGNOSIS — E03.9 ACQUIRED HYPOTHYROIDISM: ICD-10-CM

## 2021-08-25 DIAGNOSIS — Z79.4 TYPE 2 DIABETES MELLITUS WITH HYPERGLYCEMIA, WITH LONG-TERM CURRENT USE OF INSULIN: Primary | ICD-10-CM

## 2021-08-25 DIAGNOSIS — E11.65 TYPE 2 DIABETES MELLITUS WITH HYPERGLYCEMIA, WITH LONG-TERM CURRENT USE OF INSULIN: Primary | ICD-10-CM

## 2021-08-25 DIAGNOSIS — R21 RASH: ICD-10-CM

## 2021-08-25 DIAGNOSIS — F23 BRIEF PSYCHOTIC DISORDER: ICD-10-CM

## 2021-08-25 DIAGNOSIS — E78.2 MIXED HYPERLIPIDEMIA: ICD-10-CM

## 2021-08-25 DIAGNOSIS — I10 ESSENTIAL HYPERTENSION: ICD-10-CM

## 2021-08-25 PROCEDURE — 99214 OFFICE O/P EST MOD 30 MIN: CPT | Mod: S$PBB,,, | Performed by: INTERNAL MEDICINE

## 2021-08-25 PROCEDURE — 99214 PR OFFICE/OUTPT VISIT, EST, LEVL IV, 30-39 MIN: ICD-10-PCS | Mod: S$PBB,,, | Performed by: INTERNAL MEDICINE

## 2021-08-25 PROCEDURE — 99999 PR PBB SHADOW E&M-EST. PATIENT-LVL V: ICD-10-PCS | Mod: PBBFAC,,, | Performed by: INTERNAL MEDICINE

## 2021-08-25 PROCEDURE — 99999 PR PBB SHADOW E&M-EST. PATIENT-LVL V: CPT | Mod: PBBFAC,,, | Performed by: INTERNAL MEDICINE

## 2021-08-25 PROCEDURE — 99215 OFFICE O/P EST HI 40 MIN: CPT | Mod: PBBFAC,PO | Performed by: INTERNAL MEDICINE

## 2021-08-25 RX ORDER — INSULIN GLARGINE 100 [IU]/ML
INJECTION, SOLUTION SUBCUTANEOUS
Qty: 45 ML | Refills: 3 | Status: SHIPPED | OUTPATIENT
Start: 2021-08-25 | End: 2021-12-21 | Stop reason: SDUPTHER

## 2021-08-25 RX ORDER — SAXAGLIPTIN 5 MG/1
5 TABLET, FILM COATED ORAL DAILY
Qty: 90 TABLET | Refills: 3 | Status: SHIPPED | OUTPATIENT
Start: 2021-08-25 | End: 2022-08-25

## 2021-08-25 RX ORDER — INSULIN LISPRO 100 [IU]/ML
INJECTION, SOLUTION INTRAVENOUS; SUBCUTANEOUS
Qty: 45 ML | Refills: 3 | Status: SHIPPED | OUTPATIENT
Start: 2021-08-25 | End: 2021-12-21

## 2021-08-27 RX ORDER — RISPERIDONE 1 MG/1
0.5 TABLET ORAL NIGHTLY
Qty: 30 TABLET | Refills: 2 | Status: SHIPPED | OUTPATIENT
Start: 2021-08-27 | End: 2022-01-12 | Stop reason: DRUGHIGH

## 2021-09-22 ENCOUNTER — LAB VISIT (OUTPATIENT)
Dept: LAB | Facility: HOSPITAL | Age: 66
End: 2021-09-22
Attending: INTERNAL MEDICINE
Payer: MEDICARE

## 2021-09-22 ENCOUNTER — OFFICE VISIT (OUTPATIENT)
Dept: PSYCHIATRY | Facility: CLINIC | Age: 66
End: 2021-09-22
Payer: MEDICARE

## 2021-09-22 ENCOUNTER — TELEPHONE (OUTPATIENT)
Dept: PSYCHIATRY | Facility: CLINIC | Age: 66
End: 2021-09-22

## 2021-09-22 DIAGNOSIS — M85.9 DISORDER OF BONE DENSITY AND STRUCTURE, UNSPECIFIED: ICD-10-CM

## 2021-09-22 DIAGNOSIS — E78.2 MIXED HYPERLIPIDEMIA: ICD-10-CM

## 2021-09-22 DIAGNOSIS — E11.65 TYPE 2 DIABETES MELLITUS WITH HYPERGLYCEMIA, WITH LONG-TERM CURRENT USE OF INSULIN: ICD-10-CM

## 2021-09-22 DIAGNOSIS — F22 DELUSIONAL DISORDER: Primary | ICD-10-CM

## 2021-09-22 DIAGNOSIS — M85.859 OSTEOPENIA OF NECK OF FEMUR, UNSPECIFIED LATERALITY: ICD-10-CM

## 2021-09-22 DIAGNOSIS — Z79.4 TYPE 2 DIABETES MELLITUS WITH HYPERGLYCEMIA, WITH LONG-TERM CURRENT USE OF INSULIN: ICD-10-CM

## 2021-09-22 LAB
25(OH)D3+25(OH)D2 SERPL-MCNC: 31 NG/ML (ref 30–96)
ALBUMIN SERPL BCP-MCNC: 3.9 G/DL (ref 3.5–5.2)
ALP SERPL-CCNC: 70 U/L (ref 55–135)
ALT SERPL W/O P-5'-P-CCNC: 21 U/L (ref 10–44)
ANION GAP SERPL CALC-SCNC: 12 MMOL/L (ref 8–16)
AST SERPL-CCNC: 28 U/L (ref 10–40)
BILIRUB SERPL-MCNC: 0.8 MG/DL (ref 0.1–1)
BUN SERPL-MCNC: 13 MG/DL (ref 8–23)
CALCIUM SERPL-MCNC: 10.7 MG/DL (ref 8.7–10.5)
CHLORIDE SERPL-SCNC: 103 MMOL/L (ref 95–110)
CHOLEST SERPL-MCNC: 138 MG/DL (ref 120–199)
CHOLEST/HDLC SERPL: 2.7 {RATIO} (ref 2–5)
CO2 SERPL-SCNC: 26 MMOL/L (ref 23–29)
CREAT SERPL-MCNC: 0.8 MG/DL (ref 0.5–1.4)
EST. GFR  (AFRICAN AMERICAN): >60 ML/MIN/1.73 M^2
EST. GFR  (NON AFRICAN AMERICAN): >60 ML/MIN/1.73 M^2
ESTIMATED AVG GLUCOSE: 160 MG/DL (ref 68–131)
GLUCOSE SERPL-MCNC: 93 MG/DL (ref 70–110)
HBA1C MFR BLD: 7.2 % (ref 4–5.6)
HDLC SERPL-MCNC: 51 MG/DL (ref 40–75)
HDLC SERPL: 37 % (ref 20–50)
LDLC SERPL CALC-MCNC: 75.8 MG/DL (ref 63–159)
NONHDLC SERPL-MCNC: 87 MG/DL
POTASSIUM SERPL-SCNC: 4.3 MMOL/L (ref 3.5–5.1)
PROT SERPL-MCNC: 7.4 G/DL (ref 6–8.4)
SODIUM SERPL-SCNC: 141 MMOL/L (ref 136–145)
TRIGL SERPL-MCNC: 56 MG/DL (ref 30–150)

## 2021-09-22 PROCEDURE — 90832 PSYTX W PT 30 MINUTES: CPT | Mod: 95,,, | Performed by: SOCIAL WORKER

## 2021-09-22 PROCEDURE — 90832 PR PSYCHOTHERAPY W/PATIENT, 30 MIN: ICD-10-PCS | Mod: 95,,, | Performed by: SOCIAL WORKER

## 2021-09-22 PROCEDURE — 80061 LIPID PANEL: CPT | Performed by: INTERNAL MEDICINE

## 2021-09-22 PROCEDURE — 83036 HEMOGLOBIN GLYCOSYLATED A1C: CPT | Performed by: INTERNAL MEDICINE

## 2021-09-22 PROCEDURE — 82306 VITAMIN D 25 HYDROXY: CPT | Performed by: INTERNAL MEDICINE

## 2021-09-22 PROCEDURE — 80053 COMPREHEN METABOLIC PANEL: CPT | Performed by: INTERNAL MEDICINE

## 2021-09-22 PROCEDURE — 36415 COLL VENOUS BLD VENIPUNCTURE: CPT | Mod: PO | Performed by: INTERNAL MEDICINE

## 2021-09-29 ENCOUNTER — TELEPHONE (OUTPATIENT)
Dept: ENDOCRINOLOGY | Facility: CLINIC | Age: 66
End: 2021-09-29

## 2021-10-06 ENCOUNTER — OFFICE VISIT (OUTPATIENT)
Dept: PSYCHIATRY | Facility: CLINIC | Age: 66
End: 2021-10-06
Payer: MEDICARE

## 2021-10-06 DIAGNOSIS — F22 DELUSIONAL DISORDER: ICD-10-CM

## 2021-10-06 DIAGNOSIS — F41.9 ANXIETY: ICD-10-CM

## 2021-10-06 DIAGNOSIS — F23 BRIEF PSYCHOTIC DISORDER: Primary | ICD-10-CM

## 2021-10-06 DIAGNOSIS — F51.05 INSOMNIA DUE TO OTHER MENTAL DISORDER: ICD-10-CM

## 2021-10-06 DIAGNOSIS — F99 INSOMNIA DUE TO OTHER MENTAL DISORDER: ICD-10-CM

## 2021-10-06 PROCEDURE — 99214 PR OFFICE/OUTPT VISIT, EST, LEVL IV, 30-39 MIN: ICD-10-PCS | Mod: 95,,, | Performed by: REGISTERED NURSE

## 2021-10-06 PROCEDURE — 99214 OFFICE O/P EST MOD 30 MIN: CPT | Mod: 95,,, | Performed by: REGISTERED NURSE

## 2021-10-15 ENCOUNTER — DOCUMENTATION ONLY (OUTPATIENT)
Dept: INFUSION THERAPY | Facility: HOSPITAL | Age: 66
End: 2021-10-15

## 2021-10-20 ENCOUNTER — OFFICE VISIT (OUTPATIENT)
Dept: PSYCHIATRY | Facility: CLINIC | Age: 66
End: 2021-10-20
Payer: MEDICARE

## 2021-10-20 DIAGNOSIS — F22 DELUSIONAL DISORDER: Primary | ICD-10-CM

## 2021-10-20 DIAGNOSIS — F23 BRIEF PSYCHOTIC DISORDER: ICD-10-CM

## 2021-10-20 PROCEDURE — 90834 PR PSYCHOTHERAPY W/PATIENT, 45 MIN: ICD-10-PCS | Mod: 95,,, | Performed by: SOCIAL WORKER

## 2021-10-20 PROCEDURE — 90834 PSYTX W PT 45 MINUTES: CPT | Mod: 95,,, | Performed by: SOCIAL WORKER

## 2021-12-21 ENCOUNTER — OFFICE VISIT (OUTPATIENT)
Dept: ENDOCRINOLOGY | Facility: CLINIC | Age: 66
End: 2021-12-21
Payer: MEDICARE

## 2021-12-21 VITALS
DIASTOLIC BLOOD PRESSURE: 78 MMHG | SYSTOLIC BLOOD PRESSURE: 126 MMHG | OXYGEN SATURATION: 97 % | WEIGHT: 220.81 LBS | HEART RATE: 75 BPM | BODY MASS INDEX: 39.12 KG/M2 | HEIGHT: 63 IN

## 2021-12-21 DIAGNOSIS — Z79.4 TYPE 2 DIABETES MELLITUS WITH HYPERGLYCEMIA, WITH LONG-TERM CURRENT USE OF INSULIN: Primary | ICD-10-CM

## 2021-12-21 DIAGNOSIS — E03.9 ACQUIRED HYPOTHYROIDISM: ICD-10-CM

## 2021-12-21 DIAGNOSIS — E11.65 TYPE 2 DIABETES MELLITUS WITH HYPERGLYCEMIA, WITH LONG-TERM CURRENT USE OF INSULIN: Primary | ICD-10-CM

## 2021-12-21 DIAGNOSIS — E66.01 CLASS 2 SEVERE OBESITY DUE TO EXCESS CALORIES WITH SERIOUS COMORBIDITY AND BODY MASS INDEX (BMI) OF 39.0 TO 39.9 IN ADULT: ICD-10-CM

## 2021-12-21 DIAGNOSIS — I10 ESSENTIAL HYPERTENSION: ICD-10-CM

## 2021-12-21 PROCEDURE — 99999 PR PBB SHADOW E&M-EST. PATIENT-LVL III: ICD-10-PCS | Mod: PBBFAC,,, | Performed by: INTERNAL MEDICINE

## 2021-12-21 PROCEDURE — 99214 PR OFFICE/OUTPT VISIT, EST, LEVL IV, 30-39 MIN: ICD-10-PCS | Mod: S$PBB,,, | Performed by: INTERNAL MEDICINE

## 2021-12-21 PROCEDURE — 99999 PR PBB SHADOW E&M-EST. PATIENT-LVL III: CPT | Mod: PBBFAC,,, | Performed by: INTERNAL MEDICINE

## 2021-12-21 PROCEDURE — 99213 OFFICE O/P EST LOW 20 MIN: CPT | Mod: PBBFAC,PO | Performed by: INTERNAL MEDICINE

## 2021-12-21 PROCEDURE — 99214 OFFICE O/P EST MOD 30 MIN: CPT | Mod: S$PBB,,, | Performed by: INTERNAL MEDICINE

## 2021-12-21 RX ORDER — INSULIN GLARGINE 100 [IU]/ML
INJECTION, SOLUTION SUBCUTANEOUS
Qty: 45 ML | Refills: 3
Start: 2021-12-21 | End: 2022-06-30

## 2021-12-21 RX ORDER — INSULIN LISPRO 100 [IU]/ML
INJECTION, SOLUTION INTRAVENOUS; SUBCUTANEOUS
Qty: 45 ML | Refills: 3
Start: 2021-12-21 | End: 2022-06-30 | Stop reason: SDUPTHER

## 2021-12-23 PROBLEM — F22 DELUSIONAL DISORDER: Status: ACTIVE | Noted: 2021-12-23

## 2021-12-23 PROBLEM — Z86.73 HISTORY OF TRANSIENT ISCHEMIC ATTACK (TIA): Status: ACTIVE | Noted: 2021-12-23

## 2022-01-06 PROBLEM — R00.2 PALPITATIONS: Status: RESOLVED | Noted: 2019-03-03 | Resolved: 2022-01-06

## 2022-01-06 PROBLEM — R94.30 ELEVATED LEFT VENTRICULAR END-DIASTOLIC PRESSURE (LVEDP): Status: RESOLVED | Noted: 2019-03-03 | Resolved: 2022-01-06

## 2022-01-12 ENCOUNTER — OFFICE VISIT (OUTPATIENT)
Dept: PSYCHIATRY | Facility: CLINIC | Age: 67
End: 2022-01-12
Payer: MEDICARE

## 2022-01-12 DIAGNOSIS — F99 INSOMNIA DUE TO OTHER MENTAL DISORDER: ICD-10-CM

## 2022-01-12 DIAGNOSIS — F51.05 INSOMNIA DUE TO OTHER MENTAL DISORDER: ICD-10-CM

## 2022-01-12 DIAGNOSIS — F22 DELUSIONAL DISORDER: Primary | ICD-10-CM

## 2022-01-12 PROCEDURE — 99214 OFFICE O/P EST MOD 30 MIN: CPT | Mod: 95,,, | Performed by: REGISTERED NURSE

## 2022-01-12 PROCEDURE — 99214 PR OFFICE/OUTPT VISIT, EST, LEVL IV, 30-39 MIN: ICD-10-PCS | Mod: 95,,, | Performed by: REGISTERED NURSE

## 2022-01-12 RX ORDER — TRAZODONE HYDROCHLORIDE 50 MG/1
50 TABLET ORAL NIGHTLY PRN
Qty: 90 TABLET | Refills: 0 | Status: SHIPPED | OUTPATIENT
Start: 2022-01-12 | End: 2022-04-14 | Stop reason: SINTOL

## 2022-01-12 RX ORDER — RISPERIDONE 0.25 MG/1
0.25 TABLET ORAL NIGHTLY
Qty: 90 TABLET | Refills: 0 | Status: SHIPPED | OUTPATIENT
Start: 2022-01-12 | End: 2022-04-14 | Stop reason: SDUPTHER

## 2022-01-12 NOTE — PROGRESS NOTES
Outpatient Psychiatry Follow-Up Visit (MD/NP)    1/12/2022    Clinical Status of Patient:  Outpatient (Ambulatory) (Virtual)   The patient location is:    [address] [address]  The patient location [place] is: [place]  The patient phone number is: [phone]   Visit type: Virtual visit with synchronous audio and video  Each patient to whom he or she provides medical services by telemedicine is:  (1) informed of the relationship between the physician and patient and the respective role of any other health care provider with respect to management of the patient; and (2) notified that he or she may decline to receive medical services by telemedicine and may withdraw from such care at any time.  Crisis Disclaimer: Patient was informed that due to the virtual nature of the visit, that if a crisis develops, protocols will be implemented to ensure patient safety, including but not limited to: 1) Initiating a welfare check with local Law Enforcement, 2) Calling [employer]/National Crisis Hotline, and/or 3) Initiating PEC/CEC procedures.    Chief Complaint:  Annie Mason is a 66 y.o. female who presents today for follow-up of anxiety and psychosis.  Met with patient and daughter.      Interval History and Content of Current Session:  Interim Events/Subjective Report/Content of Current Session:  Reports she is coming upon graduating IOP program.  Continues to see people following her at times, however is able to ignore them most of of the time.  Additionally has been more open to speaking to people in public.  Does report some daytime tiredness.  Otherwise denies noticeable side effects of medication.  Reports good sleep.  Reports good appetite.    10/06/2021:  Patient reports she feels she is doing much better.  Is attending her group therapy.  Additionally attending therapy sessions 1 on 1 with  Pooja Stephens.  Denies current concerns.  Denies noticeable side effects of medication.    8/06/2021:  "Patient reports continued decreasing concern about delusions of people following her.  Continues to feel that people are following her but has decreased anxiety related to it.  Denies recent side effects from medications.    7/23/2021: Patient recently hospitalized at [facility] in [facility] psych unit for delusional disorder.  Released on Wednesday.  Medication change to Risperdal 1 mg by mouth nightly and trazodone 50 mg by mouth nightly.  Patient reports less concern with people following her.  However does admit that she "knows that they are actually following her" she is just less concerned and is able to distract herself by reading.    6/30/2021: Continues with paranoia.  Believes people at birthday party stated she was committing for Medicaid fraud.  Later that same day she saw people in a convenience store and felt like they said other going to kill her .  Continues to be concerned with vehicles on the road following her.  Reports decreased anxiety at times while on BuSpar.  States she is continuing to go to [employer], but otherwise not doing anything.    5/31/2021: Patient continues with paranoia.  However patient reports she is less bothered by the paranoid feelings.  She continues to report cars pulling out in front of her.  Patient denies wanting to harm the people in the cars and states she is able to read or focus on other things when she notices the cars following her.  Patient admits that she was having muscle movements while on Haldol, however did not admit this while on the Haldol.  She states she is no longer having the abnormal muscle movements.  Patient does admit to feeling "jittery" when taking 5 mg of BuSpar.  Patient reports she is taking a quarter of a 10 mg BuSpar tablet twice a day currently.    5/3/2021: Patient reports cars still follow her when she is on the road.  Additionally she states I can not even go to [employer] .  Feels that people mess with her a [employer].  " Denies any decrease in feelings of paranoia since initiating treatment.  Reports her anxiety keeps her from sleeping at night.    4/5/2021: Annie continues to feel like cars are following her when she is in the car with her daughters.  Recently she was in the grocery store and felt people in line behind her were watching her.  She told the people that line 14 was open and they should go over there, but they stayed in line behind her.  This made Annie more anxious and felt like they were watching her even more.  Additionally while getting the COVID vaccination she felt that the people in the office were watching her, listening to her, and sitting too close to her.         Patient  reviewed this visit.     Review of Systems   · PSYCHIATRIC: Pertinant items are noted in the narrative.    Past Medical, Family and Social History: The patient's past medical, family and social history have been reviewed and updated as appropriate within the electronic medical record - see encounter notes.    Compliance: yes    Side effects: see above    Risk Parameters:  Patient reports no suicidal ideation  Patient reports no homicidal ideation  Patient reports no self-injurious behavior  Patient reports no violent behavior    Exam (detailed: at least 9 elements; comprehensive: all 15 elements)     GAD7 1/12/2022 10/20/2021 10/6/2021   1. Feeling nervous, anxious, or on edge? 1 1 1   2. Not being able to stop or control worrying? 1 1 1   3. Worrying too much about different things? 1 1 1   4. Trouble relaxing? 1 1 1   5. Being so restless that it is hard to sit still? 1 1 0   6. Becoming easily annoyed or irritable? 1 1 1   7. Feeling afraid as if something awful might happen? 1 1 1   8. If you checked off any problems, how difficult have these problems made it for you to do your work, take care of things at home, or get along with other people? 1 1 1   EVER-7 Score 7 7 6     PHQ9 10/6/2021   Little interest or pleasure in doing  things: -   Feeling down, depressed or hopeless: -   Trouble falling asleep, staying asleep, or sleeping too much: -   Feeling tired or having little energy: -   Poor appetite or overeating: -   Feeling bad about yourself- or that you are a failure or have let yourself or family down -   Trouble concentrating on things, such as reading the newspaper or watching television: -   Moving or speaking so slowly that other people could have noticed. Or the opposite- being so fidgety or restless that you have been moving around a lot more than usual: -   Thoughts that you would be better off dead or hurting yourself in some way: -   If you indicated you have experienced any of the aforementioned problems, how difficult have these problems made it for you to do your work, take care of things at home or get along with other people? -   Total Score 6       Constitutional  Vitals:  Most recent vital signs, dated less than 90 days prior to this appointment, were reviewed.   There were no vitals filed for this visit.     General:  age appropriate, obese     Musculoskeletal  Muscle Strength/Tone:  no rigidity, no cogwheeling, no tremor, no tic   Gait & Station:  sitting     Psychiatric  Speech:  no latency; no press   Mood & Affect:  steady  congruent and appropriate   Thought Process:  normal and logical   Associations:  intact   Thought Content:  delusions: yes, paranoid   Insight:  has awareness of illness   Judgement: behavior is adequate to circumstances   Orientation:  grossly intact   Memory: intact for content of interview   Language: grossly intact   Attention Span & Concentration:  able to focus   Fund of Knowledge:  intact and appropriate to age and level of education     Assessment and Diagnosis   Status/Progress: Based on the examination today, the patient's problem(s) is/are resolving.  New problems have not been presented today.   Co-morbidities are complicating management of the primary condition.  There are no  active rule-out diagnoses for this patient at this time.     General Impression:  Patient doing well overall.  Continues with delusions of people following her at times, however has significantly improved tolerability.  Does report some daytime tiredness.  Otherwise denies recent side effects of medications.  Discussed decreasing dosage of risperidone.  Discussed risks versus benefits of decreasing risperidone.  Patient agreeable to current treatment plan.  Patient denies suicidal ideations, homicidal ideation, thoughts of self-harm, and hallucinations.      ICD-10-CM ICD-9-CM   1. Delusional disorder  F22 297.1   2. Insomnia due to other mental disorder  F51.05 300.9    F99 327.02       Intervention/Counseling/Treatment Plan   · Medication Management: The risks and benefits of medication were discussed with the patient. Decrease Risperdal to 0.25 mg by mouth nightly.  Continue trazodone 50 mg by mouth nightly.  · Counseling provided with patient and family as follows: importance of compliance with chosen treatment options was emphasized, risks and benefits of treatment options, including medications, were discussed with the patient, prognosis, patient and family education, instructions for  management, treatment and follow-up were reviewed   · Discussed risks of tardive dyskinesia, drug induced parkinsonism, metabolic side effects, including weight gain, neuroleptic malignant syndrome. Patient verbalized understanding.   · Discuss black box warning stating 1st generation and 2nd generation antipsychotics are not approved for dementia-related psychosis and have an increase mortality risk in elderly dementia patients often due to cardiovascular or infectious events. Patient verbalized understanding.   · Discussed with patient informed consent, risks vs. benefits, alternative treatments, side effect profile and the inherent unpredictability of individual responses to these treatments. The patient expresses  understanding of the above and displays the capacity to agree with this current plan and had no other questions.      Return to Clinic: 6 weeks     Total time spent with patient and chart:  30 minutes    Patient instructed to please go to emergency department if feeling as though you are going to harm to yourself or others or if you are in crisis; or to please call the clinic to report any worsening of symptoms or problems associated with medication.     A portion of this note was created using [employer] voice recognition software that occasionally misinterprets phrases or words.

## 2022-01-12 NOTE — PATIENT INSTRUCTIONS
Change Risperidone 0.25 mg by mouth nightly.     Continue Trazodone 50 mg by mouth nightly as needed for insomnia.             Please go to emergency department if feeling as though you are going to harm to yourself or others or if you are in crisis.     Please call the clinic to report any worsening of symptoms or problems associated with medication.

## 2022-01-13 ENCOUNTER — LAB VISIT (OUTPATIENT)
Dept: LAB | Facility: HOSPITAL | Age: 67
End: 2022-01-13
Attending: NURSE PRACTITIONER
Payer: MEDICARE

## 2022-01-13 DIAGNOSIS — I51.89 DIASTOLIC DYSFUNCTION: ICD-10-CM

## 2022-01-13 LAB
ANION GAP SERPL CALC-SCNC: 10 MMOL/L (ref 8–16)
BUN SERPL-MCNC: 10 MG/DL (ref 8–23)
CALCIUM SERPL-MCNC: 10 MG/DL (ref 8.7–10.5)
CHLORIDE SERPL-SCNC: 106 MMOL/L (ref 95–110)
CO2 SERPL-SCNC: 25 MMOL/L (ref 23–29)
CREAT SERPL-MCNC: 0.7 MG/DL (ref 0.5–1.4)
EST. GFR  (AFRICAN AMERICAN): >60 ML/MIN/1.73 M^2
EST. GFR  (NON AFRICAN AMERICAN): >60 ML/MIN/1.73 M^2
GLUCOSE SERPL-MCNC: 128 MG/DL (ref 70–110)
MAGNESIUM SERPL-MCNC: 1.8 MG/DL (ref 1.6–2.6)
POTASSIUM SERPL-SCNC: 3.8 MMOL/L (ref 3.5–5.1)
SODIUM SERPL-SCNC: 141 MMOL/L (ref 136–145)

## 2022-01-13 PROCEDURE — 83735 ASSAY OF MAGNESIUM: CPT | Performed by: NURSE PRACTITIONER

## 2022-01-13 PROCEDURE — 80048 BASIC METABOLIC PNL TOTAL CA: CPT | Performed by: NURSE PRACTITIONER

## 2022-01-13 PROCEDURE — 36415 COLL VENOUS BLD VENIPUNCTURE: CPT | Mod: PO | Performed by: NURSE PRACTITIONER

## 2022-02-16 ENCOUNTER — LAB VISIT (OUTPATIENT)
Dept: LAB | Facility: HOSPITAL | Age: 67
End: 2022-02-16
Attending: INTERNAL MEDICINE
Payer: MEDICARE

## 2022-02-16 DIAGNOSIS — E11.65 TYPE 2 DIABETES MELLITUS WITH HYPERGLYCEMIA, WITH LONG-TERM CURRENT USE OF INSULIN: ICD-10-CM

## 2022-02-16 DIAGNOSIS — Z79.4 TYPE 2 DIABETES MELLITUS WITH HYPERGLYCEMIA, WITH LONG-TERM CURRENT USE OF INSULIN: ICD-10-CM

## 2022-02-16 LAB
ESTIMATED AVG GLUCOSE: 160 MG/DL (ref 68–131)
HBA1C MFR BLD: 7.2 % (ref 4–5.6)

## 2022-02-16 PROCEDURE — 36415 COLL VENOUS BLD VENIPUNCTURE: CPT | Mod: PO | Performed by: INTERNAL MEDICINE

## 2022-02-16 PROCEDURE — 83036 HEMOGLOBIN GLYCOSYLATED A1C: CPT | Performed by: INTERNAL MEDICINE

## 2022-02-23 ENCOUNTER — OFFICE VISIT (OUTPATIENT)
Dept: ENDOCRINOLOGY | Facility: CLINIC | Age: 67
End: 2022-02-23
Payer: MEDICARE

## 2022-02-23 VITALS
OXYGEN SATURATION: 95 % | BODY MASS INDEX: 37.54 KG/M2 | HEART RATE: 78 BPM | SYSTOLIC BLOOD PRESSURE: 130 MMHG | DIASTOLIC BLOOD PRESSURE: 68 MMHG | WEIGHT: 211.88 LBS | HEIGHT: 63 IN

## 2022-02-23 DIAGNOSIS — E66.01 CLASS 2 SEVERE OBESITY DUE TO EXCESS CALORIES WITH SERIOUS COMORBIDITY AND BODY MASS INDEX (BMI) OF 37.0 TO 37.9 IN ADULT: ICD-10-CM

## 2022-02-23 DIAGNOSIS — E03.4 HYPOTHYROIDISM DUE TO ACQUIRED ATROPHY OF THYROID: ICD-10-CM

## 2022-02-23 DIAGNOSIS — I10 ESSENTIAL HYPERTENSION: ICD-10-CM

## 2022-02-23 DIAGNOSIS — R44.1 HALLUCINATIONS, VISUAL: ICD-10-CM

## 2022-02-23 DIAGNOSIS — E78.2 MIXED HYPERLIPIDEMIA: ICD-10-CM

## 2022-02-23 DIAGNOSIS — E03.9 ACQUIRED HYPOTHYROIDISM: ICD-10-CM

## 2022-02-23 DIAGNOSIS — M85.859 OSTEOPENIA OF NECK OF FEMUR, UNSPECIFIED LATERALITY: ICD-10-CM

## 2022-02-23 DIAGNOSIS — E11.65 TYPE 2 DIABETES MELLITUS WITH HYPERGLYCEMIA, WITH LONG-TERM CURRENT USE OF INSULIN: Primary | ICD-10-CM

## 2022-02-23 DIAGNOSIS — Z79.4 TYPE 2 DIABETES MELLITUS WITH HYPERGLYCEMIA, WITH LONG-TERM CURRENT USE OF INSULIN: Primary | ICD-10-CM

## 2022-02-23 DIAGNOSIS — E21.3 HYPERPARATHYROIDISM: ICD-10-CM

## 2022-02-23 PROCEDURE — 99999 PR PBB SHADOW E&M-EST. PATIENT-LVL V: CPT | Mod: PBBFAC,,, | Performed by: INTERNAL MEDICINE

## 2022-02-23 PROCEDURE — 99215 OFFICE O/P EST HI 40 MIN: CPT | Mod: PBBFAC,PO | Performed by: INTERNAL MEDICINE

## 2022-02-23 PROCEDURE — 99214 PR OFFICE/OUTPT VISIT, EST, LEVL IV, 30-39 MIN: ICD-10-PCS | Mod: S$PBB,,, | Performed by: INTERNAL MEDICINE

## 2022-02-23 PROCEDURE — 99999 PR PBB SHADOW E&M-EST. PATIENT-LVL V: ICD-10-PCS | Mod: PBBFAC,,, | Performed by: INTERNAL MEDICINE

## 2022-02-23 PROCEDURE — 99214 OFFICE O/P EST MOD 30 MIN: CPT | Mod: S$PBB,,, | Performed by: INTERNAL MEDICINE

## 2022-02-23 NOTE — ASSESSMENT & PLAN NOTE
BMI 37. Encouraged healthy low fat low carb diet and increase physical activity. Reviewed how psych meds can contribute to weight gain.

## 2022-02-23 NOTE — ASSESSMENT & PLAN NOTE
Pt with osteopenia. Last deca June 2020. ?if BMD exacerbated by elevated parathyroid hormone. Discussed fall precautions. On vit D replacement. Needs calcium 1200 mg daily (preferrably from food sources). No indication for further intervention at this point. Repeat another dexa 2 years from prior.

## 2022-02-23 NOTE — ASSESSMENT & PLAN NOTE
Pt with hallucinations that cars are driving towards her. Sx's are not controlled and dicussed how this can be a danger to the pt and others around her, [place] when she's driving. Recommend pt consider further antipsychotics. F /u with psych.

## 2022-02-23 NOTE — ASSESSMENT & PLAN NOTE
Pt with macrovascular complication of CVA.  Pt taking antipsychotic which can exacerbate BG.  HbA1C 7.2%.  Near goal. Goal is <7% if this can be obtained without hypoglycemia.  Pt with labile BG. Has some occasional low's. Discussed should keep food diary when BG is low or very high. May need less insulin with certain foods and more insulin with other foods.    Continue Lantus 40 units once daily.  Continue Humalog 11 units with b/d and 12 units with lunch. If pre-meal blood sugar less than 100, reduce by 2-5 units.   Continue Onglyza 5 mg once daily.   Reviewed how to correct hypoglycemia.    Did not tolerate SGLT-2i, GLP-1, or metformin.  Check BG before meals and bedtime. Reviewed benefits of CGM but pt not interested at this time.  Discussed foot care.  Last eye exam <1 yr ago and no DPR per pt.  Repeat urine micro.  Counseled pt on low carb/low fat diet and to increase physical activity.  On statin.

## 2022-02-23 NOTE — PROGRESS NOTES
Subjective:    Patient ID:  Annie Mason is a 66 y.o. female.    Chief Complaint:  Diabetes      Pt presents to follow up for type 2 diabetes, HPT, osteopenia, and review of chronic medical conditions as listed in the visit diagnosis section of this encounter. Pt accompanied by her daughter Samantha who also provided some history.     PMH also significant for ? myasthenia gravis.      Overview: Onset of Type 2 diabetes mellitus was < 10 years ago. Has history of CVA but no other macrovascular complications. No microvascular complications. No history of DKA.   Cardiovascular risk factors: advanced age (older than 55 for men, 65 for women), diabetes mellitus, dyslipidemia, hypertension and sedentary lifestyle. No hx of DR per pt. No history of pancreatitis or MEN syndrome.      Was hospitalized for visual and auditory hallucinations for 2 weeks and started on Risperdal. Notes BG trended up since then. She was contacted regarding her medications and the insurance required a few brand changes. Was sent onglyza but did not take it. Received Humalog instead of Novolog.       Currently taking:  Humalog 11 units with b/d and 12 units with lunch.    Lantus 40 units at night  Onglyza 5 mg once daily. No hx of pancreatitis or MEN syndrome.       In the past: Took metformin. Read an article that metformin can cause spots on her legs and stopped taking. Has one spot on her R lateral leg that has been more tender than usual.   Started Farxiga and took about 1 week. Had dizziness and polyuria so she stopped.  Tried Bydureon but had severe nausea and stopped taking.      Home blood sugar records:   Fasting   Pre lunch   Pre-dinner   Bedtime -      Did not tolerate SGLT-2i, GLP-1, or metformin.  Check BG before meals and bedtime. Bring log to next visit.   Last eye exam <1 yr ago and no DPR per pt.      Plans to keep working on portion size. Is trying to eat more meat and veggies. Has reduced  "pasta/rice/and carbs.  Sometimes snacks between meals. Typically eats 3 meals a day. Notes often gets hungry around 9 pm. Snacks on tuna fish with crackers, fruit, popcorn, low carb chips, and vanilla wafers.        Does not exercise regularly.   Last eye exam <1 year ago and no DR per pt.  Any episodes of hypoglycemia? Yes. Occurs randomly at different times of day. Get's symptomatic with shaking, sweating, feeling weak when BG <70.    Still having hallucinations. Feels like cars are following her and driving towards her with their lights on. She "knows these things are real." Is not interested in taking more psych meds.                Diabetes Management Status    Statin: Taking  ACE/ARB: Not taking    Screening or Prevention Patient's value Goal Complete/Controlled?   HgA1C Testing and Control   Lab Results   Component Value Date    HGBA1C 7.2 (H) 02/16/2022      Annually/Less than 8% Yes   Lipid profile : 09/22/2021 Annually Yes   LDL control Lab Results   Component Value Date    LDLCALC 75.8 09/22/2021    Annually/Less than 100 mg/dl  Yes   Nephropathy screening Lab Results   Component Value Date    LABMICR 10.0 02/16/2022     Lab Results   Component Value Date    PROTEINUA Negative 03/08/2021    Annually Yes   Blood pressure BP Readings from Last 1 Encounters:   02/23/22 130/68    Less than 140/90 Yes   Dilated retinal exam : 12/15/2021 Annually Yes   Foot exam   : 02/23/2022 Annually No     Review of Systems   Psychiatric/Behavioral: Positive for hallucinations.        Past Medical History:   Diagnosis Date    Allergy     Anticoagulant long-term use     Brachial plexitis     Depression     Diabetes mellitus, type 2     GERD (gastroesophageal reflux disease)     History of vitamin D deficiency     Hyperlipidemia 2/4/2015    Hypertension     Hypothyroidism     MG (myasthenia gravis)     Mild intermittent asthma with acute exacerbation 12/26/2017    Myasthenia gravis without exacerbation 3/25/2015 " "   Seizures     "years ago"    Stroke     2008      Social History     Tobacco Use    Smoking status: Never Smoker    Smokeless tobacco: Never Used   Substance Use Topics    Alcohol use: No    Drug use: No     Family History   Problem Relation Age of Onset    Diabetes Mother     Hypertension Mother     Hypertension Father     Stroke Father     Ovarian cancer Maternal Aunt     Breast cancer Maternal Aunt         age unknown    Myasthenia gravis Paternal Aunt     Diabetes type I Son     Breast cancer Sister 64      Past Surgical History:   Procedure Laterality Date    CATARACT EXTRACTION Left 09/2021    CHOLECYSTECTOMY      CORONARY ANGIOGRAPHY N/A 1/28/2019    Procedure: ANGIOGRAM, CORONARY ARTERY;  Surgeon: Ike Jasso III, MD;  Location: [facility] CATH;  Service: Cardiology;  Laterality: N/A;    LEFT HEART CATHETERIZATION Left 1/28/2019    Procedure: Left heart cath;  Surgeon: Ike Jasso III, MD;  Location: [facility] CATH;  Service: Cardiology;  Laterality: Left;    PORTACATH PLACEMENT  12/14/2016    thalmusectomy      TUBAL LIGATION            Current Outpatient Medications:     albuterol (PROVENTIL) 2.5 mg /3 mL (0.083 %) nebulizer solution, Take 2.5 mg by nebulization every 6 (six) hours as needed for Wheezing. Rescue, Disp: , Rfl:     albuterol sulfate (PROAIR RESPICLICK) 90 mcg/actuation inhaler, Inhale 180 mcg into the lungs every 4 (four) hours. Rescue, Disp: , Rfl:     aspirin (ECOTRIN) 81 MG EC tablet, Take 81 mg by mouth once daily., Disp: , Rfl:     atorvastatin (LIPITOR) 80 MG tablet, Take 1 tablet (80 mg total) by mouth once daily., Disp: 90 tablet, Rfl: 3    BD VEO INSULIN SYRINGE UF 0.3 mL 31 gauge x 15/64" Syrg, USE TO INJECT NOVOLOG 3 TIMES DAILY, Disp: , Rfl: 3    blood pressure monitor Kit, Use to check blood pressure daily, Disp: , Rfl: 0    blood sugar diagnostic (ONETOUCH VERIO TEST STRIPS) Strp, Check blood sugar 4 times daily.  DX E11.65., Disp: 400 strip, Rfl: " 11    blood sugar diagnostic Strp, To check BG 3 times daily, to use with insurance preferred meter, Disp: 400 strip, Rfl: 11    blood-glucose meter kit, Please check blood sugar four times daily and as needed. Re: Diabetes E11.65, Disp: 1 each, Rfl: 1    blood-glucose meter kit, To check BG 3 times daily, to use with insurance preferred meter, Disp: 1 each, Rfl: 0    compress.stocking,knee,reg,med Misc, 1 Pair by Misc.(Non-Drug; Combo Route) route once daily. 15-20 mmHg, Disp: 2 each, Rfl: 1    cyanocobalamin (VITAMIN B-12) 100 MCG tablet, Take 50 mcg by mouth once daily., Disp: , Rfl:     diclofenac sodium (VOLTAREN) 1 % Gel, Apply 2 g topically 3 (three) times daily as needed (pain)., Disp: 100 g, Rfl: 0    ergocalciferol (ERGOCALCIFEROL) 50,000 unit Cap, TAKE 1 CAPSULE BY MOUTH 1 TIME EVERY WEEK, Disp: 12 capsule, Rfl: 1    fluocinonide 0.05% (LIDEX) 0.05 % cream, Apply topically 2 (two) times daily., Disp: 1 Tube, Rfl: 3    fluticasone (FLONASE) 50 mcg/actuation nasal spray, 2 sprays each  nostril bid x 1 week then q day prn  nasal congestion or sneezing, Disp: 1 Bottle, Rfl: 3    fluticasone furoate-vilanteroL (BREO ELLIPTA) 200-25 mcg/dose DsDv diskus inhaler, INHALE 1 PUFF BY MOUTH INTO LUNGS ONCE EVERY DAY, Disp: 180 each, Rfl: 3    hydrOXYzine HCL (ATARAX) 25 MG tablet, Take 1 tablet (25 mg total) by mouth every 6 (six) hours as needed for Itching., Disp: 360 tablet, Rfl: 0    insulin (LANTUS SOLOSTAR U-100 INSULIN) glargine 100 units/mL (3mL) SubQ pen, Inject 40 Units into the skin every evening., Disp: 45 mL, Rfl: 3    insulin lispro (HUMALOG KWIKPEN INSULIN) 100 unit/mL pen, Take 11 TID with each meal. Max daily dose 60 units/day, Disp: 45 mL, Rfl: 3    lancets (ONETOUCH DELICA LANCETS) 33 gauge Misc, 1 lancet by Misc.(Non-Drug; Combo Route) route once daily., Disp: 400 each, Rfl: 3    lancets Misc, To check BG 3 times daily, to use with insurance preferred meter, Disp: 100 each, Rfl:  "11    levothyroxine (SYNTHROID) 50 MCG tablet, TAKE 1 TABLET BY MOUTH EVERY DAY, Disp: 90 tablet, Rfl: 3    loratadine (CLARITIN) 10 mg tablet, TAKE 1 TABLET ONCE A DAY AS NEEDED FOR ALLERGIES, Disp: 90 tablet, Rfl: 2    multivitamin (THERAGRAN) per tablet, Take 1 tablet by mouth once daily., Disp: , Rfl:     nystatin (MYCOSTATIN) cream, Apply topically 2 (two) times daily., Disp: 30 g, Rfl: 0    pen needle, diabetic (BD ULTRA-FINE SHORT PEN NEEDLE) 31 gauge x 5/16" Ndle, Use 4x daily with insulin, Disp: 400 each, Rfl: 3    potassium chloride SA (K-DUR,KLOR-CON) 20 MEQ tablet, TAKE 1 TABLET BY MOUTH EVERY DAY, Disp: 90 tablet, Rfl: 1    pyridostigmine (MESTINON) 60 mg Tab, Take 1 tablet (60 mg total) by mouth every 6 (six) hours. Take  3 to 4 times a day for symptoms of MG., Disp: 360 tablet, Rfl: 3    risperiDONE (RISPERDAL) 0.25 MG Tab, Take 1 tablet (0.25 mg total) by mouth every evening., Disp: 90 tablet, Rfl: 0    SAXagliptin (ONGLYZA) 5 mg Tab tablet, Take 1 tablet (5 mg total) by mouth once daily., Disp: 90 tablet, Rfl: 3    spironolactone (ALDACTONE) 25 MG tablet, TAKE 1 TABLET BY MOUTH EVERY DAY, Disp: 90 tablet, Rfl: 3    traZODone (DESYREL) 50 MG tablet, Take 1 tablet (50 mg total) by mouth nightly as needed for Insomnia., Disp: 90 tablet, Rfl: 0    furosemide (LASIX) 40 MG tablet, Take 40 mg po q day; can utilize an additional 40 mg po q afternoon PRN weight gain > 3 lbs, increased SOB / KAPADIA, or edema. (Patient not taking: Reported on 2/23/2022), Disp: 90 tablet, Rfl: 1     Review of patient's allergies indicates:   Allergen Reactions    Ramipril      Other reaction(s): caused a severe cough        Objective:   /68   Pulse 78   Ht 5' 3" (1.6 m)   Wt 96.1 kg (211 lb 13.8 oz)   SpO2 95%   BMI 37.53 kg/m²   BP Readings from Last 3 Encounters:   02/23/22 130/68   02/07/22 138/80   01/06/22 (!) 150/72     Wt Readings from Last 3 Encounters:   02/23/22 1025 96.1 kg (211 lb 13.8 oz) "   02/07/22 1144 98.4 kg (217 lb)   01/06/22 0936 100.2 kg (221 lb)          Physical Exam  Vitals reviewed.   Constitutional:       General: She is not in acute distress.     Appearance: Normal appearance. She is well-developed. She is not ill-appearing, toxic-appearing or diaphoretic.   HENT:      Head: Normocephalic and atraumatic.   Eyes:      General: No scleral icterus.  Neck:      Trachea: No tracheal deviation.   Cardiovascular:      Rate and Rhythm: Normal rate and regular rhythm.      Heart sounds: Murmur heard.   Pulmonary:      Effort: Pulmonary effort is normal. No respiratory distress.   Abdominal:      Palpations: Abdomen is soft.   Skin:     Comments: Bump on front forehead    Foot exam:+2 DP/PT pulses bilat, no lesions or ulcers, nl microfilament bilat, missing R big toe nail (no signs of infection), a few mycotic toe nails bilat     Neurological:      Mental Status: She is alert and oriented to person, place, and time.   Psychiatric:         Thought Content: Thought content normal.           Lab Results   Component Value Date     01/13/2022    K 3.8 01/13/2022     01/13/2022    CO2 25 01/13/2022    BUN 10 01/13/2022    CREATININE 0.7 01/13/2022     (H) 01/13/2022    HGBA1C 7.2 (H) 02/16/2022    MG 1.8 01/13/2022    AST 28 09/22/2021    AST 44 (H) 03/05/2016    ALT 21 09/22/2021    ALBUMIN 3.9 09/22/2021    PROT 7.4 09/22/2021    BILITOT 0.8 09/22/2021    WBC 5.21 03/08/2021    HGB 13.1 03/08/2021    HCT 40.0 03/08/2021    MCV 93 03/08/2021    MCH 30.5 03/08/2021     03/08/2021    MPV 10.6 03/08/2021    GRAN 3.1 03/08/2021    GRAN 59.5 03/08/2021    LYMPH 1.6 03/08/2021    LYMPH 29.9 03/08/2021    CHOL 138 09/22/2021    HDL 51 09/22/2021    LDLCALC 75.8 09/22/2021    TRIG 56 09/22/2021       Lab Results   Component Value Date    TSH 1.330 03/08/2021    FREET4 0.81 03/05/2016        Thyroid Labs Latest Ref Rng & Units 6/24/2021 9/22/2021 1/13/2022   TSH 0.400 - 4.000 uIU/mL -  - -   Free T4 0.78 - 2.19 ng/dL - - -   Sodium 136 - 145 mmol/L 139 141 141   Potassium 3.5 - 5.1 mmol/L 4.3 4.3 3.8   Chloride 95 - 110 mmol/L 101 103 106   Carbon Dioxide 23 - 29 mmol/L 29 26 25   Glucose 70 - 110 mg/dL 111(H) 93 128(H)   Blood Urea Nitrogen 8 - 23 mg/dL 13 13 10   Creatinine 0.5 - 1.4 mg/dL 0.71 0.8 0.7   Calcium 8.7 - 10.5 mg/dL 10.3(H) 10.7(H) 10.0   Total Protein 6.0 - 8.4 g/dL - 7.4 -   Albumin 3.5 - 5.2 g/dL - 3.9 -   Total Bilirubin 0.1 - 1.0 mg/dL - 0.8 -   AST 10 - 40 U/L - 28 -   ALT 10 - 44 U/L - 21 -   Anion Gap 8 - 16 mmol/L 9 12 10   eGFR (African American) >60 mL/min/1.73 m:2 >60 >60.0 >60.0   eGFR (Non-African American) >60 mL/min/1.73 m:2 >60 >60.0 >60.0   WBC 3.90 - 12.70 K/uL - - -   RBC 4.00 - 5.40 M/uL - - -   Hemoglobin 12.0 - 16.0 g/dL - - -   Hematocrit 37.0 - 48.5 % - - -   MCV 82 - 98 fL - - -   MCH 27.0 - 31.0 pg - - -   MCHC 32.0 - 36.0 g/dL - - -   RDW 11.5 - 14.5 % - - -   Platelets 150 - 350 K/uL - - -   MPV 9.2 - 12.9 fL - - -   Gran # 1.8 - 7.7 K/uL - - -   Lymph # 1.0 - 4.8 K/uL - - -   Mono # 0.3 - 1.0 K/uL - - -   Eos # 0.0 - 0.5 K/uL - - -   Baso # 0.00 - 0.20 K/uL - - -   Gran % 38.0 - 73.0 % - - -   Lymph % 18.0 - 48.0 % - - -   Mono% 4.0 - 15.0 % - - -   Eos % 0.0 - 8.0 % - - -   Baso % 0.0 - 1.9 % - - -   INR - - - -           Hemoglobin A1C   Date Value Ref Range Status   02/16/2022 7.2 (H) 4.0 - 5.6 % Final     Comment:     ADA Screening Guidelines:  5.7-6.4%  Consistent with prediabetes  >or=6.5%  Consistent with diabetes    High levels of fetal hemoglobin interfere with the HbA1C  assay. Heterozygous hemoglobin variants (HbS, HgC, etc)do  not significantly interfere with this assay.   However, presence of multiple variants may affect accuracy.     09/22/2021 7.2 (H) 4.0 - 5.6 % Final     Comment:     ADA Screening Guidelines:  5.7-6.4%  Consistent with prediabetes  >or=6.5%  Consistent with diabetes    High levels of fetal hemoglobin interfere with the  HbA1C  assay. Heterozygous hemoglobin variants (HbS, HgC, etc)do  not significantly interfere with this assay.   However, presence of multiple variants may affect accuracy.     06/24/2021 7.5 (H) 0.0 - 5.6 % Final     Comment:     Reference Interval:  5.0 - 5.6 Normal   5.7 - 6.4 High Risk   > 6.5 Diabetic      Hgb A1c results are standardized based on the (NGSP) National   Glycohemoglobin Standardization Program.      Hemoglobin A1C levels are related to mean serum/plasma glucose   during the preceding 2-3 months.              Assessment and plan:     Problem List Items Addressed This Visit        Cardiac/Vascular    Essential hypertension     BP controlled. Continue current meds. Pt to notify PCP if BP consistently more than 140/90.             Hyperlipidemia     Continue statin. Last LDL 75. F/u with PCP.              Endocrine    Type 2 diabetes mellitus with hyperglycemia, with long-term current use of insulin - Primary     Pt with macrovascular complication of CVA.  Pt taking antipsychotic which can exacerbate BG.  HbA1C 7.2%.  Near goal. Goal is <7% if this can be obtained without hypoglycemia.  Pt with labile BG. Has some occasional low's. Discussed should keep food diary when BG is low or very high. May need less insulin with certain foods and more insulin with other foods.    Continue Lantus 40 units once daily.  Continue Humalog 11 units with b/d and 12 units with lunch. If pre-meal blood sugar less than 100, reduce by 2-5 units.   Continue Onglyza 5 mg once daily.   Reviewed how to correct hypoglycemia.    Did not tolerate SGLT-2i, GLP-1, or metformin.  Check BG before meals and bedtime. Reviewed benefits of CGM but pt not interested at this time.  Discussed foot care.  Last eye exam <1 yr ago and no DPR per pt.  Repeat urine micro.  Counseled pt on low carb/low fat diet and to increase physical activity.  On statin.                 Relevant Orders    Hemoglobin A1C    Hypothyroidism    Relevant Orders     TSH    T4, Free    Hyperparathyroidism    Relevant Orders    PTH, Intact    Basic Metabolic Panel       Orthopedic    Osteopenia of neck of femur     Pt with osteopenia. Last deca June 2020. ?if BMD exacerbated by elevated parathyroid hormone. Discussed fall precautions. On vit D replacement. Needs calcium 1200 mg daily (preferrably from food sources). No indication for further intervention at this point. Repeat another dexa 2 years from prior.               Other    Class 2 severe obesity due to excess calories with serious comorbidity and body mass index (BMI) of 37.0 to 37.9 in adult     BMI 37. Encouraged healthy low fat low carb diet and increase physical activity. Reviewed how psych meds can contribute to weight gain.           Hallucinations, visual     Pt with hallucinations that cars are driving towards her. Sx's are not controlled and dicussed how this can be a danger to the pt and others around her, marysol when she's driving. Recommend pt consider further antipsychotics. F /u with psych.                 Next DEXA due June 2022    RTC in 3 months with A1C, PTH, BMP, TSH, Free T4 prior          Disclaimer: This note has been generated using voice-recognition software. There may be typographical errors that have been missed during proof-reading.

## 2022-03-30 ENCOUNTER — OFFICE VISIT (OUTPATIENT)
Dept: DERMATOLOGY | Facility: CLINIC | Age: 67
End: 2022-03-30
Payer: MEDICARE

## 2022-03-30 DIAGNOSIS — L82.1 SEBORRHEIC KERATOSES: ICD-10-CM

## 2022-03-30 DIAGNOSIS — L72.0 EPIDERMAL INCLUSION CYST: Primary | ICD-10-CM

## 2022-03-30 PROCEDURE — 99203 OFFICE O/P NEW LOW 30 MIN: CPT | Mod: S$PBB,,, | Performed by: STUDENT IN AN ORGANIZED HEALTH CARE EDUCATION/TRAINING PROGRAM

## 2022-03-30 PROCEDURE — 99999 PR PBB SHADOW E&M-EST. PATIENT-LVL III: ICD-10-PCS | Mod: PBBFAC,,, | Performed by: STUDENT IN AN ORGANIZED HEALTH CARE EDUCATION/TRAINING PROGRAM

## 2022-03-30 PROCEDURE — 99203 PR OFFICE/OUTPT VISIT, NEW, LEVL III, 30-44 MIN: ICD-10-PCS | Mod: S$PBB,,, | Performed by: STUDENT IN AN ORGANIZED HEALTH CARE EDUCATION/TRAINING PROGRAM

## 2022-03-30 PROCEDURE — 99999 PR PBB SHADOW E&M-EST. PATIENT-LVL III: CPT | Mod: PBBFAC,,, | Performed by: STUDENT IN AN ORGANIZED HEALTH CARE EDUCATION/TRAINING PROGRAM

## 2022-03-30 PROCEDURE — 99213 OFFICE O/P EST LOW 20 MIN: CPT | Mod: PBBFAC | Performed by: STUDENT IN AN ORGANIZED HEALTH CARE EDUCATION/TRAINING PROGRAM

## 2022-03-30 RX ORDER — HYDROXYZINE PAMOATE 25 MG/1
25 CAPSULE ORAL NIGHTLY PRN
COMMUNITY
Start: 2022-02-10 | End: 2022-04-14

## 2022-03-30 RX ORDER — KETOROLAC TROMETHAMINE 5 MG/ML
SOLUTION OPHTHALMIC
COMMUNITY
Start: 2022-01-25 | End: 2022-06-29

## 2022-03-30 RX ORDER — PREDNISOLONE ACETATE 10 MG/ML
SUSPENSION/ DROPS OPHTHALMIC
COMMUNITY
Start: 2022-03-14 | End: 2022-06-29

## 2022-03-30 RX ORDER — OFLOXACIN 3 MG/ML
SOLUTION/ DROPS OPHTHALMIC
COMMUNITY
Start: 2022-01-25 | End: 2022-06-29

## 2022-03-30 NOTE — PROGRESS NOTES
Patient Information  Name: Annie Mason  : 1955  MRN: [id number]     Referring Physician:  Dr. Sales   Primary Care Physician:  Dr. Sherice Gerardo MD   Date of Visit: 2022      Subjective:       Annie Mason is a 66 y.o. female who presents for   Chief Complaint   Patient presents with    Cyst     On forehead, x 2-3 months. Tx none      HPI  Patient with new complaint of lesion(s)  Location: scalp  Duration: 2-3 months  Symptoms: growing, draining  Relieving factors/Previous treatments: none    Patient with new complaint of lesion(s)  Location: right leg  Duration: years  Symptoms: none  Relieving factors/Previous treatments: none      Patient was last seen:Visit date not found     Prior notes by myself reviewed.   Clinical documentation obtained by nursing staff reviewed.    Review of Systems   Skin: Negative for itching and rash.        Objective:    Physical Exam   Constitutional: She appears well-developed and well-nourished. No distress.   Neurological: She is alert and oriented to person, place, and time. She is not disoriented.   Psychiatric: She has a normal mood and affect.   Skin:   Areas Examined (abnormalities noted in diagram):   Scalp / Hair Palpated and Inspected  Head / Face Inspection Performed  RLE Inspected  LLE Inspection Performed                   Diagram Legend     Erythematous scaling macule/papule c/w actinic keratosis       Vascular papule c/w angioma      Pigmented verrucoid papule/plaque c/w seborrheic keratosis      Yellow umbilicated papule c/w sebaceous hyperplasia      Irregularly shaped tan macule c/w lentigo     1-2 mm smooth white papules consistent with Milia      Movable subcutaneous cyst with punctum c/w epidermal inclusion cyst      Subcutaneous movable cyst c/w pilar cyst      Firm pink to brown papule c/w dermatofibroma      Pedunculated fleshy papule(s) c/w skin tag(s)      Evenly pigmented macule c/w junctional nevus     Mildly variegated  pigmented, slightly irregular-bordered macule c/w mildly atypical nevus      Flesh colored to evenly pigmented papule c/w intradermal nevus       Pink pearly papule/plaque c/w basal cell carcinoma      Erythematous hyperkeratotic cursted plaque c/w SCC      Surgical scar with no sign of skin cancer recurrence      Open and closed comedones      Inflammatory papules and pustules      Verrucoid papule consistent consistent with wart     Erythematous eczematous patches and plaques     Dystrophic onycholytic nail with subungual debris c/w onychomycosis     Umbilicated papule    Erythematous-base heme-crusted tan verrucoid plaque consistent with inflamed seborrheic keratosis     Erythematous Silvery Scaling Plaque c/w Psoriasis     See annotation      No images are attached to the encounter or orders placed in the encounter.    [] Data reviewed  [] Independent review of test  [] Management discussed with another provider    Assessment / Plan:        Epidermal inclusion cyst  - Scheduled for surgery. Discussed nature of surgery, benefits, risks, alternatives.    Seborrheic keratoses  These are benign inherited growths without a malignant potential. Reassurance given to patient. No treatment is necessary.              LOS NUMBER AND COMPLEXITY OF PROBLEMS    COMPLEXITY OF DATA RISK TOTAL TIME (m)   48599  [id number] [] 1 self-limited or minor problem [x] Minimal to none [] No treatment recommended or patient to monitor 15-29  10-19   28919  [id number] Low  [] 2 or > self limited or minor problems  [] 1 stable chronic illness  [] 1 acute, uncomplicated illness or injury Limited (2)  [] Prior external notes from each unique source  [] Review result of each unique test  [] Order each unique test [x]  Low  OTC medications, minor skin biopsy 30-44 20-29   39633  [id number] Moderate  [x]  1 or > chronic illness with progression, exacerbation or SE of treatment  []  2 or more stable chronic illnesses  []  1 acute illness with systemic  symptoms  []  1 acute complicated injury  []  1 undiagnosed new problem with uncertain prognosis Moderate (1/3 below)  []  3 or more data items        *Now includes assessment requiring independent historian  []  Independent interpretation of a test  []  Discuss management/test with another provider Moderate  []  Prescription drug mgmt  []  Minor surgery with risk discussed  []  Mgmt limited by social determinates 45-59  30-39   60335  20428 High  []  1 or more chronic illness with severe exacerbation, progression or SE of treatment  []  1 acute or chronic illness/injury that poses a threat to life or bodily function Extensive (2/3 below)  []  3 or more data items        *Now includes assessment requiring independent historian.  []  Independent interpretation of a test  []  Discuss management/test with another provider High  []  Major surgery with risk discussed  []  Drug therapy requiring intensive monitoring for toxicity  []  Hospitalization  []  Decision for DNR 60-74  40-54      No follow-ups on file.    Lauren Eisenberg MD, FAAD  [facility]

## 2022-04-05 ENCOUNTER — PROCEDURE VISIT (OUTPATIENT)
Dept: DERMATOLOGY | Facility: CLINIC | Age: 67
End: 2022-04-05
Payer: MEDICARE

## 2022-04-05 DIAGNOSIS — L72.0 EPIDERMAL INCLUSION CYST: Primary | ICD-10-CM

## 2022-04-05 PROCEDURE — 99499 UNLISTED E&M SERVICE: CPT | Mod: S$PBB,,, | Performed by: STUDENT IN AN ORGANIZED HEALTH CARE EDUCATION/TRAINING PROGRAM

## 2022-04-05 PROCEDURE — 11442 PR EXC SKIN BENIG 1.1-2 CM FACE,FACIAL: ICD-10-PCS | Mod: S$PBB,51,, | Performed by: STUDENT IN AN ORGANIZED HEALTH CARE EDUCATION/TRAINING PROGRAM

## 2022-04-05 PROCEDURE — 12051 PR INTERMED WOUND REPAIR FACE/EAR/EYELID/NOSE/LIP/MUC MEBR, 2.5CM OR LESS: ICD-10-PCS | Mod: S$PBB,,, | Performed by: STUDENT IN AN ORGANIZED HEALTH CARE EDUCATION/TRAINING PROGRAM

## 2022-04-05 PROCEDURE — 11442 EXC FACE-MM B9+MARG 1.1-2 CM: CPT | Mod: S$PBB,51,, | Performed by: STUDENT IN AN ORGANIZED HEALTH CARE EDUCATION/TRAINING PROGRAM

## 2022-04-05 PROCEDURE — 12051 INTMD RPR FACE/MM 2.5 CM/<: CPT | Mod: S$PBB,,, | Performed by: STUDENT IN AN ORGANIZED HEALTH CARE EDUCATION/TRAINING PROGRAM

## 2022-04-05 PROCEDURE — 99499 NO LOS: ICD-10-PCS | Mod: S$PBB,,, | Performed by: STUDENT IN AN ORGANIZED HEALTH CARE EDUCATION/TRAINING PROGRAM

## 2022-04-05 PROCEDURE — 88305 TISSUE EXAM BY PATHOLOGIST: CPT | Performed by: DERMATOLOGY

## 2022-04-05 PROCEDURE — 11442 EXC FACE-MM B9+MARG 1.1-2 CM: CPT | Mod: PBBFAC | Performed by: STUDENT IN AN ORGANIZED HEALTH CARE EDUCATION/TRAINING PROGRAM

## 2022-04-05 PROCEDURE — 88304 PR  SURG PATH,LEVEL III: ICD-10-PCS | Mod: 26,,, | Performed by: DERMATOLOGY

## 2022-04-05 PROCEDURE — 12051 INTMD RPR FACE/MM 2.5 CM/<: CPT | Mod: PBBFAC,51 | Performed by: STUDENT IN AN ORGANIZED HEALTH CARE EDUCATION/TRAINING PROGRAM

## 2022-04-05 PROCEDURE — 88304 TISSUE EXAM BY PATHOLOGIST: CPT | Mod: 26,,, | Performed by: DERMATOLOGY

## 2022-04-05 NOTE — PROGRESS NOTES
Patient Information  Name: Annie Mason  : 1955  MRN: [id number]     Referring Physician:  Dr. Rodríguez ref. provider found   Primary Care Physician:  Dr. Sherice Gerardo MD   Date of Visit: 2022      Subjective:       Annie Mason is a 66 y.o. female who presents for SQ nodule    HPI  Dermatologic Surgery preoperative checklist:    Pacemaker/Defibrillator:  none    Anticoagulants:  none    Implants requiring prophylaxis:  none    Any other conditions affecting surgery:  none    Patient was last seen:3/30/2022     Prior notes by myself reviewed.   Clinical documentation obtained by nursing staff reviewed.    Review of Systems   Skin: Negative for itching and rash.        Objective:    Physical Exam   Constitutional: She appears well-developed and well-nourished. No distress.   Neurological: She is alert and oriented to person, place, and time. She is not disoriented.   Psychiatric: She has a normal mood and affect.   Skin:   Areas Examined (abnormalities noted in diagram):   Scalp / Hair Palpated and Inspected  Head / Face Inspection Performed              Diagram Legend     Erythematous scaling macule/papule c/w actinic keratosis       Vascular papule c/w angioma      Pigmented verrucoid papule/plaque c/w seborrheic keratosis      Yellow umbilicated papule c/w sebaceous hyperplasia      Irregularly shaped tan macule c/w lentigo     1-2 mm smooth white papules consistent with Milia      Movable subcutaneous cyst with punctum c/w epidermal inclusion cyst      Subcutaneous movable cyst c/w pilar cyst      Firm pink to brown papule c/w dermatofibroma      Pedunculated fleshy papule(s) c/w skin tag(s)      Evenly pigmented macule c/w junctional nevus     Mildly variegated pigmented, slightly irregular-bordered macule c/w mildly atypical nevus      Flesh colored to evenly pigmented papule c/w intradermal nevus       Pink pearly papule/plaque c/w basal cell carcinoma      Erythematous hyperkeratotic  cursted plaque c/w SCC      Surgical scar with no sign of skin cancer recurrence      Open and closed comedones      Inflammatory papules and pustules      Verrucoid papule consistent consistent with wart     Erythematous eczematous patches and plaques     Dystrophic onycholytic nail with subungual debris c/w onychomycosis     Umbilicated papule    Erythematous-base heme-crusted tan verrucoid plaque consistent with inflamed seborrheic keratosis     Erythematous Silvery Scaling Plaque c/w Psoriasis     See annotation      No images are attached to the encounter or orders placed in the encounter.    [] Data reviewed  [] Independent review of test  [] Management discussed with another provider    Assessment / Plan:      Pathology Orders:     Normal Orders This Visit    Specimen to Pathology, Dermatology     Questions:    Procedure Type: Dermatology and skin neoplasms    Number of Specimens: 1    ------------------------: -------------------------    Spec 1 Procedure: Excision >2cm    Spec 1 Clinical Impression: EIC vs other    Spec 1 Source: left forehead    Release to patient: Immediate        Epidermal inclusion cyst  -     Specimen to Pathology, Dermatology  PROCEDURE: Elliptical excision with intermediate layered repair in order to decrease dead space, decrease tension, and close large gap.    ANESTHETIC: 6 cc 1% Xylocaine with Epinephrine 1:100,000, buffered    SURGEON: Lauren Eisenberg M.D.    ASSISTANTS: Ngozi Elizondo MA    PREOPERATIVE DIAGNOSIS:  EIC    POSTOPERATIVE DIAGNOSIS:  Same as preoperative diagnosis    PATHOLOGIC DIAGNOSIS: Pending    LOCATION: left forehead    INITIAL LESION SIZE: 2 cm    EXCISED DIAMETER: 2 cm    PREPARATION: The diagnosis, procedure, alternatives, benefits and risks, including but not limited to: infection, bleeding/bruising, drug reactions, pain, scar or cosmetic defect, local sensation disturbances, wound dehiscence (separation of wound edges after sutures removed) and/or  recurrence of present condition were explained to the patient. The patient elected to proceed.  Patient's identity was verified using 2 patient identifiers and the side and site was verified.  Time out period with surgeon, assistant and patient in surgical suite was taken.    PROCEDURE: The location noted above was prepped, draped, and anesthetized in the usual sterile fashion per Lauren Eisenberg MD. Lesional tissue was carefully marked with at least 0 mm margins of clinically normal skin in all directions. A fusiform elliptical excision was done with #15 blade carried down completely through the dermis into the deep subcutaneous tissues to the level of the non-muscle fascia, and dissection was carried out in that plane.  Electrocoagulation was used to obtain hemostasis. Blood loss was minimal. The wound was then approximated in a layered fashion with subcutaneous and intradermal sutures of 4.0 Monocryl, approximately 3 in number, and the wound was then superficially closed with simple interrupted sutures of 5.0 Prolene.    The patient tolerated the procedure well.    The area was cleaned and dressed appropriately and the patient was given wound care instructions, as well as an appointment for follow-up evaluation.    LENGTH OF REPAIR: 2.5 cm             LOS NUMBER AND COMPLEXITY OF PROBLEMS    COMPLEXITY OF DATA RISK TOTAL TIME (m)   30294  22905 [] 1 self-limited or minor problem [] Minimal to none [] No treatment recommended or patient to monitor 15-29  10-19   25025  36194 Low  [] 2 or > self limited or minor problems  [] 1 stable chronic illness  [] 1 acute, uncomplicated illness or injury Limited (2)  [] Prior external notes from each unique source  [] Review result of each unique test  [] Order each unique test []  Low  OTC medications, minor skin biopsy 30-44 20-29   91406  81787 Moderate  []  1 or > chronic illness with progression, exacerbation or SE of treatment  []  2 or more stable chronic illnesses  []   1 acute illness with systemic symptoms  []  1 acute complicated injury  []  1 undiagnosed new problem with uncertain prognosis Moderate (1/3 below)  []  3 or more data items        *Now includes assessment requiring independent historian  []  Independent interpretation of a test  []  Discuss management/test with another provider Moderate  []  Prescription drug mgmt  []  Minor surgery with risk discussed  []  Mgmt limited by social determinates 45-59  30-39   33180  63959 High  []  1 or more chronic illness with severe exacerbation, progression or SE of treatment  []  1 acute or chronic illness/injury that poses a threat to life or bodily function Extensive (2/3 below)  []  3 or more data items        *Now includes assessment requiring independent historian.  []  Independent interpretation of a test  []  Discuss management/test with another provider High  []  Major surgery with risk discussed  []  Drug therapy requiring intensive monitoring for toxicity  []  Hospitalization  []  Decision for DNR 60-74  40-54      Follow up in about 1 week (around 4/12/2022).    Lauren Eisenberg MD, FAAD  [facility]

## 2022-04-12 LAB
FINAL PATHOLOGIC DIAGNOSIS: NORMAL
GROSS: NORMAL
Lab: NORMAL
MICROSCOPIC EXAM: NORMAL

## 2022-04-13 ENCOUNTER — CLINICAL SUPPORT (OUTPATIENT)
Dept: DERMATOLOGY | Facility: CLINIC | Age: 67
End: 2022-04-13
Payer: MEDICARE

## 2022-04-13 DIAGNOSIS — Z48.02 VISIT FOR SUTURE REMOVAL: Primary | ICD-10-CM

## 2022-04-14 ENCOUNTER — OFFICE VISIT (OUTPATIENT)
Dept: PSYCHIATRY | Facility: CLINIC | Age: 67
End: 2022-04-14
Payer: MEDICARE

## 2022-04-14 VITALS
WEIGHT: 209.31 LBS | HEIGHT: 63 IN | SYSTOLIC BLOOD PRESSURE: 128 MMHG | BODY MASS INDEX: 37.09 KG/M2 | DIASTOLIC BLOOD PRESSURE: 81 MMHG | HEART RATE: 82 BPM

## 2022-04-14 DIAGNOSIS — F23 BRIEF PSYCHOTIC DISORDER: ICD-10-CM

## 2022-04-14 DIAGNOSIS — F41.9 ANXIETY: Primary | ICD-10-CM

## 2022-04-14 DIAGNOSIS — F51.05 INSOMNIA DUE TO OTHER MENTAL DISORDER: ICD-10-CM

## 2022-04-14 DIAGNOSIS — F99 INSOMNIA DUE TO OTHER MENTAL DISORDER: ICD-10-CM

## 2022-04-14 DIAGNOSIS — F41.9 ANXIETY: ICD-10-CM

## 2022-04-14 DIAGNOSIS — F20.0 PARANOID SCHIZOPHRENIA: Primary | ICD-10-CM

## 2022-04-14 PROCEDURE — 99214 OFFICE O/P EST MOD 30 MIN: CPT | Mod: S$PBB,,, | Performed by: REGISTERED NURSE

## 2022-04-14 PROCEDURE — 99999 PR PBB SHADOW E&M-EST. PATIENT-LVL II: CPT | Mod: PBBFAC,,, | Performed by: SOCIAL WORKER

## 2022-04-14 PROCEDURE — 90834 PR PSYCHOTHERAPY W/PATIENT, 45 MIN: ICD-10-PCS | Mod: ,,, | Performed by: SOCIAL WORKER

## 2022-04-14 PROCEDURE — 99212 OFFICE O/P EST SF 10 MIN: CPT | Mod: PBBFAC,PN | Performed by: SOCIAL WORKER

## 2022-04-14 PROCEDURE — 99999 PR PBB SHADOW E&M-EST. PATIENT-LVL V: ICD-10-PCS | Mod: PBBFAC,,, | Performed by: REGISTERED NURSE

## 2022-04-14 PROCEDURE — 99999 PR PBB SHADOW E&M-EST. PATIENT-LVL V: CPT | Mod: PBBFAC,,, | Performed by: REGISTERED NURSE

## 2022-04-14 PROCEDURE — 99215 OFFICE O/P EST HI 40 MIN: CPT | Mod: PBBFAC,27,PN | Performed by: REGISTERED NURSE

## 2022-04-14 PROCEDURE — 90834 PSYTX W PT 45 MINUTES: CPT | Mod: ,,, | Performed by: SOCIAL WORKER

## 2022-04-14 PROCEDURE — 99214 PR OFFICE/OUTPT VISIT, EST, LEVL IV, 30-39 MIN: ICD-10-PCS | Mod: S$PBB,,, | Performed by: REGISTERED NURSE

## 2022-04-14 PROCEDURE — 99999 PR PBB SHADOW E&M-EST. PATIENT-LVL II: ICD-10-PCS | Mod: PBBFAC,,, | Performed by: SOCIAL WORKER

## 2022-04-14 RX ORDER — RISPERIDONE 1 MG/1
1 TABLET ORAL NIGHTLY
Qty: 90 TABLET | Refills: 1 | Status: SHIPPED | OUTPATIENT
Start: 2022-04-14 | End: 2022-06-16

## 2022-04-14 RX ORDER — RISPERIDONE 0.25 MG/1
0.25 TABLET ORAL EVERY MORNING
Qty: 90 TABLET | Refills: 1 | Status: SHIPPED | OUTPATIENT
Start: 2022-04-14 | End: 2022-05-19

## 2022-04-14 NOTE — PROGRESS NOTES
Individual Psychotherapy (PhD/LCSW)    4/14/2022    Site:  [place]         Therapeutic Intervention: Met with patient.  Outpatient - Insight oriented psychotherapy 45 min - CPT code 47077, Outpatient - Behavior modifying psychotherapy 45 min - CPT code 21510 and Outpatient - Supportive psychotherapy 45 min - CPT Code 04012    Chief complaint/reason for encounter: anxiety and psychosis             Interval history and content of current session:  Client was referred to treatment by Rodolfo CARMICHAEL to address depression and psychosis.  Client arrived to session on time and was fully engaged.  Client has made significant progress.  She continues to have the delusion about the cars but continues to use her coping skills to manage those delusions.  Client reported that she is working now and she is glad to be working.  She reported that at first, her children had concerns but now they are very supportive.   encouraged client to continue to use her coping skills to manage the delusion.  Client to continue in one-to-one sessions.    Treatment plan:  · Target symptoms: depression, anxiety , psychosis  · Why chosen therapy is appropriate versus another modality: relevant to diagnosis, patient responds to this modality, evidence based practice  · Outcome monitoring methods: self-report  · Therapeutic intervention type: insight oriented psychotherapy, behavior modifying psychotherapy, supportive psychotherapy    Risk parameters:  Patient reports no suicidal ideation  Patient reports no homicidal ideation  Patient reports no self-injurious behavior  Patient reports no violent behavior          CSSRS was completed: No risk    Verbal deficits: None    Patient's response to intervention:  The patient's response to intervention is accepting.    Progress toward goals and other mental status changes:  The patient's progress toward goals is fair , good.    Diagnosis:     ICD-10-CM ICD-9-CM   1. Anxiety  F41.9 300.00   2. Brief  psychotic disorder  F23 298.8       Plan:  individual psychotherapy and Ct to continue to see Rodolfo CARMICHAEL for psych med mgt Pt to go to ED or call 911 if symptoms worsen or if she has thoughts of harming self and/or others. Pt verbalized understanding.    Return to clinic: as scheduled    Length of Service (minutes): 45      Each patient to whom he or she provides medical services by telemedicine is: (1) informed of the relationship between the physician and patient and the respective role of any other health care provider with respect to management of the patient; and (2) notified that he or she may decline to receive medical services by telemedicine and may withdraw from such care at any time.

## 2022-04-14 NOTE — PATIENT INSTRUCTIONS
Continue Risperdal 0.25 mg by mouth every morning.    Continue Risperdal 1 mg by mouth every night.             Please go to emergency department if feeling as though you are going to harm to yourself or others or if you are in crisis.     Please call the clinic to report any worsening of symptoms or problems associated with medication.      National Suicide Prevention Lifeline    The Lifeline provides 24/7, free and confidential support for people in distress, prevention and crisis resources for you or your loved ones, and best practices for professionals in the [place].    3-706-251-8784    988 has been designated as the new three-digit dialing code that will route callers to the National Suicide Prevention Lifeline. While some areas may be currently able to connect to the Lifeline by dialing 988, this dialing code will be available to everyone across the [place] starting on July 16, 2022.     988      Lifeline Chat    Lifeline Chat is a service of the National Suicide Prevention Lifeline, connecting individuals with counselors for emotional support and other services via web chat. All chat centers in the Lifeline network are accredited by [employer]. Lifeline Chat is available 24/7 across the [place]    https://suicidepreventionlifeline.org/chat/

## 2022-04-29 NOTE — PROGRESS NOTES
"Outpatient Psychiatry Follow-Up Visit (MD/NP)    4/14/2022    Clinical Status of Patient:  Outpatient (Ambulatory)    Chief Complaint:  Annie Mason is a 66 y.o. female who presents today for follow-up of anxiety and psychosis.  Met with patient and daughter.      Interval History and Content of Current Session:  Interim Events/Subjective Report/Content of Current Session:  Patient recently graduated from [facility] program.  Continues to see people following her while in her car.  However is able to use coping skills and ignore people that are following her in her car.  Reports improved mood.  Plans to resume therapy with Pooja Stephens LCSW  Reports starting working part-time recently.  Reports good sleep.  Reports good appetite.    01/12/2022:  Reports she is coming upon graduating [facility] program.  Continues to see people following her at times, however is able to ignore them most of of the time.  Additionally has been more open to speaking to people in public.  Does report some daytime tiredness.  Otherwise denies noticeable side effects of medication.  Reports good sleep.  Reports good appetite.    10/06/2021:  Patient reports she feels she is doing much better.  Is attending her group therapy.  Additionally attending therapy sessions 1 on 1 with  Pooja Stephens.  Denies current concerns.  Denies noticeable side effects of medication.    8/06/2021: Patient reports continued decreasing concern about delusions of people following her.  Continues to feel that people are following her but has decreased anxiety related to it.  Denies recent side effects from medications.    7/23/2021: Patient recently hospitalized at [facility] in [facility] psych unit for delusional disorder.  Released on Wednesday.  Medication change to Risperdal 1 mg by mouth nightly and trazodone 50 mg by mouth nightly.  Patient reports less concern with people following her.  However does admit that she "knows that they " "are actually following her" she is just less concerned and is able to distract herself by reading.    6/30/2021: Continues with paranoia.  Believes people at birthday party stated she was committing for Medicaid fraud.  Later that same day she saw people in a convenience store and felt like they said other going to kill her .  Continues to be concerned with vehicles on the road following her.  Reports decreased anxiety at times while on [employer].  States she is continuing to go to [employer], but otherwise not doing anything.            Patient  reviewed this visit.     Review of Systems   · PSYCHIATRIC: Pertinant items are noted in the narrative.    Past Medical, Family and Social History: The patient's past medical, family and social history have been reviewed and updated as appropriate within the electronic medical record - see encounter notes.    Compliance: yes    Side effects: see above    Risk Parameters:  Patient reports no suicidal ideation  Patient reports no homicidal ideation  Patient reports no self-injurious behavior  Patient reports no violent behavior    Exam (detailed: at least 9 elements; comprehensive: all 15 elements)     GAD7 4/14/2022 1/12/2022 10/20/2021   1. Feeling nervous, anxious, or on edge? 0 1 1   2. Not being able to stop or control worrying? 1 1 1   3. Worrying too much about different things? 0 1 1   4. Trouble relaxing? 1 1 1   5. Being so restless that it is hard to sit still? 0 1 1   6. Becoming easily annoyed or irritable? 0 1 1   7. Feeling afraid as if something awful might happen? 3 1 1   8. If you checked off any problems, how difficult have these problems made it for you to do your work, take care of things at home, or get along with other people? 0 1 1   EVER-7 Score 5 7 7     PHQ9 4/14/2022   Little interest or pleasure in doing things: Not at all   Feeling down, depressed or hopeless: Not at all   Trouble falling asleep, staying asleep, or sleeping too much: Nearly every " "day   Feeling tired or having little energy: Nearly every day   Poor appetite or overeating: Not at all   Feeling bad about yourself- or that you are a failure or have let yourself or family down Not at all   Trouble concentrating on things, such as reading the newspaper or watching television: Nearly every day   Moving or speaking so slowly that other people could have noticed. Or the opposite- being so fidgety or restless that you have been moving around a lot more than usual: Not at all   Thoughts that you would be better off dead or hurting yourself in some way: Not at all   If you indicated you have experienced any of the aforementioned problems, how difficult have these problems made it for you to do your work, take care of things at home or get along with other people? Not difficult at all   Total Score 9       Constitutional  Vitals:  Most recent vital signs, dated less than 90 days prior to this appointment, were reviewed.   Vitals:    04/14/22 1029   BP: 128/81   Pulse: 82   Weight: 95 kg (209 lb 5.2 oz)   Height: 5' 3" (1.6 m)        General:  age appropriate, obese     Musculoskeletal  Muscle Strength/Tone:  no rigidity, no cogwheeling, no tremor, no tic   Gait & Station:  sitting     Psychiatric  Speech:  no latency; no press   Mood & Affect:  steady  congruent and appropriate   Thought Process:  normal and logical   Associations:  intact   Thought Content:  delusions: yes, paranoid   Insight:  has awareness of illness   Judgement: behavior is adequate to circumstances   Orientation:  grossly intact   Memory: intact for content of interview   Language: grossly intact   Attention Span & Concentration:  able to focus   Fund of Knowledge:  intact and appropriate to age and level of education     Assessment and Diagnosis   Status/Progress: Based on the examination today, the patient's problem(s) is/are resolving.  New problems have not been presented today.   Co-morbidities are complicating management of " the primary condition.  There are no active rule-out diagnoses for this patient at this time.     General Impression:  Patient doing well overall.  Continues with delusions of people following her at times, however has significantly improved tolerability.  Recently graduated from [facility].  Denies wanting change to medication at this time.  Denies noticeable side effects of medications at this time.  Patient agreeable to current treatment plan.  Patient denies suicidal ideations, homicidal ideation, thoughts of self-harm, and hallucinations.      ICD-10-CM ICD-9-CM   1. Paranoid schizophrenia  F20.0 295.30   2. Anxiety  F41.9 300.00   3. Insomnia due to other mental disorder  F51.05 300.9    F99 327.02       Intervention/Counseling/Treatment Plan   · Medication Management: The risks and benefits of medication were discussed with the patient.  · Counseling provided with patient and family as follows: importance of compliance with chosen treatment options was emphasized, risks and benefits of treatment options, including medications, were discussed with the patient, prognosis, patient and family education, instructions for  management, treatment and follow-up were reviewed   · Discussed risks of tardive dyskinesia, drug induced parkinsonism, metabolic side effects, including weight gain, neuroleptic malignant syndrome. Patient verbalized understanding.   · Discuss black box warning stating 1st generation and 2nd generation antipsychotics are not approved for dementia-related psychosis and have an increase mortality risk in elderly dementia patients often due to cardiovascular or infectious events. Patient verbalized understanding.   · Discussed with patient informed consent, risks vs. benefits, alternative treatments, side effect profile and the inherent unpredictability of individual responses to these treatments. The patient expresses understanding of the above and displays the capacity to agree with this current plan  and had no other questions.      Return to Clinic: 1 month     Total time spent with patient and chart:  39 minutes    Patient instructed to please go to emergency department if feeling as though you are going to harm to yourself or others or if you are in crisis; or to please call the clinic to report any worsening of symptoms or problems associated with medication.     A portion of this note was created using [employer] voice recognition software that occasionally misinterprets phrases or words.

## 2022-05-13 DIAGNOSIS — E11.65 TYPE 2 DIABETES MELLITUS WITH HYPERGLYCEMIA, WITH LONG-TERM CURRENT USE OF INSULIN: ICD-10-CM

## 2022-05-13 DIAGNOSIS — Z79.4 TYPE 2 DIABETES MELLITUS WITH HYPERGLYCEMIA, WITH LONG-TERM CURRENT USE OF INSULIN: ICD-10-CM

## 2022-05-14 RX ORDER — LANCETS 33 GAUGE
EACH MISCELLANEOUS
Qty: 400 EACH | Refills: 3 | Status: SHIPPED | OUTPATIENT
Start: 2022-05-14 | End: 2023-03-16 | Stop reason: SDUPTHER

## 2022-05-17 DIAGNOSIS — E11.65 TYPE 2 DIABETES MELLITUS WITH HYPERGLYCEMIA, WITH LONG-TERM CURRENT USE OF INSULIN: ICD-10-CM

## 2022-05-17 DIAGNOSIS — Z79.4 TYPE 2 DIABETES MELLITUS WITH HYPERGLYCEMIA, WITH LONG-TERM CURRENT USE OF INSULIN: ICD-10-CM

## 2022-05-18 RX ORDER — LANCETS
EACH MISCELLANEOUS
Qty: 300 EACH | Refills: 3 | Status: SHIPPED | OUTPATIENT
Start: 2022-05-18 | End: 2023-03-16 | Stop reason: SDUPTHER

## 2022-05-18 RX ORDER — INSULIN PUMP SYRINGE, 3 ML
EACH MISCELLANEOUS
Qty: 1 EACH | Refills: 0 | Status: SHIPPED | OUTPATIENT
Start: 2022-05-18 | End: 2023-08-25

## 2022-05-30 ENCOUNTER — LAB VISIT (OUTPATIENT)
Dept: LAB | Facility: HOSPITAL | Age: 67
End: 2022-05-30
Attending: INTERNAL MEDICINE
Payer: MEDICARE

## 2022-05-30 DIAGNOSIS — E21.3 HYPERPARATHYROIDISM: ICD-10-CM

## 2022-05-30 DIAGNOSIS — E11.65 TYPE 2 DIABETES MELLITUS WITH HYPERGLYCEMIA, WITH LONG-TERM CURRENT USE OF INSULIN: ICD-10-CM

## 2022-05-30 DIAGNOSIS — E03.4 HYPOTHYROIDISM DUE TO ACQUIRED ATROPHY OF THYROID: ICD-10-CM

## 2022-05-30 DIAGNOSIS — Z79.4 TYPE 2 DIABETES MELLITUS WITH HYPERGLYCEMIA, WITH LONG-TERM CURRENT USE OF INSULIN: ICD-10-CM

## 2022-05-30 LAB
ANION GAP SERPL CALC-SCNC: 11 MMOL/L (ref 8–16)
BUN SERPL-MCNC: 16 MG/DL (ref 8–23)
CALCIUM SERPL-MCNC: 9.5 MG/DL (ref 8.7–10.5)
CHLORIDE SERPL-SCNC: 105 MMOL/L (ref 95–110)
CO2 SERPL-SCNC: 24 MMOL/L (ref 23–29)
CREAT SERPL-MCNC: 0.8 MG/DL (ref 0.5–1.4)
EST. GFR  (AFRICAN AMERICAN): >60 ML/MIN/1.73 M^2
EST. GFR  (NON AFRICAN AMERICAN): >60 ML/MIN/1.73 M^2
ESTIMATED AVG GLUCOSE: 169 MG/DL (ref 68–131)
GLUCOSE SERPL-MCNC: 205 MG/DL (ref 70–110)
HBA1C MFR BLD: 7.5 % (ref 4–5.6)
POTASSIUM SERPL-SCNC: 3.6 MMOL/L (ref 3.5–5.1)
PTH-INTACT SERPL-MCNC: 182.1 PG/ML (ref 9–77)
SODIUM SERPL-SCNC: 140 MMOL/L (ref 136–145)
T4 FREE SERPL-MCNC: 0.63 NG/DL (ref 0.71–1.51)
TSH SERPL DL<=0.005 MIU/L-ACNC: 1.05 UIU/ML (ref 0.4–4)

## 2022-05-30 PROCEDURE — 83036 HEMOGLOBIN GLYCOSYLATED A1C: CPT | Performed by: INTERNAL MEDICINE

## 2022-05-30 PROCEDURE — 84443 ASSAY THYROID STIM HORMONE: CPT | Performed by: INTERNAL MEDICINE

## 2022-05-30 PROCEDURE — 83970 ASSAY OF PARATHORMONE: CPT | Performed by: INTERNAL MEDICINE

## 2022-05-30 PROCEDURE — 80048 BASIC METABOLIC PNL TOTAL CA: CPT | Performed by: INTERNAL MEDICINE

## 2022-05-30 PROCEDURE — 36415 COLL VENOUS BLD VENIPUNCTURE: CPT | Mod: PO | Performed by: INTERNAL MEDICINE

## 2022-05-30 PROCEDURE — 84439 ASSAY OF FREE THYROXINE: CPT | Performed by: INTERNAL MEDICINE

## 2022-06-16 ENCOUNTER — OFFICE VISIT (OUTPATIENT)
Dept: PSYCHIATRY | Facility: CLINIC | Age: 67
End: 2022-06-16
Payer: MEDICARE

## 2022-06-16 VITALS
HEIGHT: 63 IN | DIASTOLIC BLOOD PRESSURE: 84 MMHG | WEIGHT: 214.75 LBS | HEART RATE: 68 BPM | SYSTOLIC BLOOD PRESSURE: 150 MMHG | BODY MASS INDEX: 38.05 KG/M2

## 2022-06-16 DIAGNOSIS — F99 INSOMNIA DUE TO OTHER MENTAL DISORDER: ICD-10-CM

## 2022-06-16 DIAGNOSIS — F51.05 INSOMNIA DUE TO OTHER MENTAL DISORDER: ICD-10-CM

## 2022-06-16 DIAGNOSIS — F41.9 ANXIETY: ICD-10-CM

## 2022-06-16 DIAGNOSIS — F22 DELUSIONAL DISORDER: ICD-10-CM

## 2022-06-16 DIAGNOSIS — R44.1 VISUAL HALLUCINATION: ICD-10-CM

## 2022-06-16 DIAGNOSIS — F23 BRIEF PSYCHOTIC DISORDER: Primary | ICD-10-CM

## 2022-06-16 DIAGNOSIS — F20.0 PARANOID SCHIZOPHRENIA: Primary | ICD-10-CM

## 2022-06-16 PROCEDURE — 99999 PR PBB SHADOW E&M-EST. PATIENT-LVL V: ICD-10-PCS | Mod: PBBFAC,,, | Performed by: REGISTERED NURSE

## 2022-06-16 PROCEDURE — 99999 PR PBB SHADOW E&M-EST. PATIENT-LVL V: CPT | Mod: PBBFAC,,, | Performed by: REGISTERED NURSE

## 2022-06-16 PROCEDURE — 99214 PR OFFICE/OUTPT VISIT, EST, LEVL IV, 30-39 MIN: ICD-10-PCS | Mod: S$PBB,,, | Performed by: REGISTERED NURSE

## 2022-06-16 PROCEDURE — 99214 OFFICE O/P EST MOD 30 MIN: CPT | Mod: S$PBB,,, | Performed by: REGISTERED NURSE

## 2022-06-16 PROCEDURE — 99215 OFFICE O/P EST HI 40 MIN: CPT | Mod: PBBFAC,27,PN | Performed by: REGISTERED NURSE

## 2022-06-16 PROCEDURE — 99999 PR PBB SHADOW E&M-EST. PATIENT-LVL II: CPT | Mod: PBBFAC,,, | Performed by: SOCIAL WORKER

## 2022-06-16 PROCEDURE — 90834 PR PSYCHOTHERAPY W/PATIENT, 45 MIN: ICD-10-PCS | Mod: ,,, | Performed by: SOCIAL WORKER

## 2022-06-16 PROCEDURE — 99999 PR PBB SHADOW E&M-EST. PATIENT-LVL II: ICD-10-PCS | Mod: PBBFAC,,, | Performed by: SOCIAL WORKER

## 2022-06-16 PROCEDURE — 99212 OFFICE O/P EST SF 10 MIN: CPT | Mod: PBBFAC,PN | Performed by: SOCIAL WORKER

## 2022-06-16 PROCEDURE — 90834 PSYTX W PT 45 MINUTES: CPT | Mod: ,,, | Performed by: SOCIAL WORKER

## 2022-06-16 RX ORDER — RISPERIDONE 0.5 MG/1
0.5 TABLET ORAL NIGHTLY
Qty: 90 TABLET | Refills: 1 | Status: SHIPPED | OUTPATIENT
Start: 2022-06-16 | End: 2022-06-29

## 2022-06-16 RX ORDER — RISPERIDONE 0.25 MG/1
0.25 TABLET ORAL EVERY MORNING
Qty: 90 TABLET | Refills: 1 | Status: SHIPPED | OUTPATIENT
Start: 2022-06-16 | End: 2023-01-26 | Stop reason: SDUPTHER

## 2022-06-16 NOTE — PROGRESS NOTES
Individual Psychotherapy (PhD/LCSW)    2022    Site:  [place]         Therapeutic Intervention: Met with patient.  Outpatient - Insight oriented psychotherapy 45 min - CPT code 59503, Outpatient - Behavior modifying psychotherapy 45 min - CPT code 90272 and Outpatient - Supportive psychotherapy 45 min - CPT Code 36399    Chief complaint/reason for encounter: anxiety and psychosis             Interval history and content of current session:  Client was referred to treatment by Rodolfo CARMICHAEL to address anxiety and psychosis.  Client arrived to session on time and was fully engaged.  She continues to report that she is still working, driving herself around, and is involved in her [employer] again. She reported that her mother  on Mother's day and that everything was peaceful. She reported that the services were nice and that she and her siblings got along well.  She continues to have the delusions of cars being everywhere around her and following her however she reports that she manages by acknowledging them, speaking to them, and then they subside a little bit.  She reported that she and on of her daughters that she was having issues with, are getting along much better now.  She did not report any episodes of anxiety or depression or distress.  Though she believes her delusions are real, she makes efforts to continue to live her life despite them.  She reported that her coping skills also included praying and working.  Client was well groomed, well-versed, full affect, and fully engaged.  Her thought process was linear and she had appropriate responses to questions.  Client will continue to be monitored by Rodolfo CARMICHAEL and this .  Client to continue in one-to-one sessions.    Treatment plan:  · Target symptoms: anxiety , psychosis  · Why chosen therapy is appropriate versus another modality: relevant to diagnosis, patient responds to this modality, evidence based practice  · Outcome monitoring methods:  self-report  · Therapeutic intervention type: insight oriented psychotherapy, behavior modifying psychotherapy, supportive psychotherapy    Risk parameters:  Patient reports no suicidal ideation  Patient reports no homicidal ideation  Patient reports no self-injurious behavior  Patient reports no violent behavior          CSSRS was completed: No risk    Verbal deficits: None    Patient's response to intervention:  The patient's response to intervention is accepting.    Progress toward goals and other mental status changes:  The patient's progress toward goals is fair , good, some.    Diagnosis:     ICD-10-CM ICD-9-CM   1. Brief psychotic disorder  F23 298.8   2. Anxiety  F41.9 300.00   3. Delusional disorder  F22 297.1   4. Visual hallucination  R44.1 368.16       Plan:  individual psychotherapy and Ct to continue to see Rodolfo marquez psych med mgt Pt to go to ED or call 911 if symptoms worsen or if she has thoughts of harming self and/or others. Pt verbalized understanding.    Return to clinic: as scheduled    Length of Service (minutes): 45      Each patient to whom he or she provides medical services by telemedicine is: (1) informed of the relationship between the physician and patient and the respective role of any other health care provider with respect to management of the patient; and (2) notified that he or she may decline to receive medical services by telemedicine and may withdraw from such care at any time.

## 2022-06-16 NOTE — PATIENT INSTRUCTIONS
Continue Risperdal 0.25 mg by mouth every morning.    Decrease Risperdal 0.5 mg by mouth every night.             Please go to emergency department if feeling as though you are going to harm to yourself or others or if you are in crisis.     Please call the clinic to report any worsening of symptoms or problems associated with medication.      National Suicide Prevention Lifeline    The Lifeline provides 24/7, free and confidential support for people in distress, prevention and crisis resources for you or your loved ones, and best practices for professionals in the [place].    [phone]    988 has been designated as the new three-digit dialing code that will route callers to the National Suicide Prevention Lifeline. While some areas may be currently able to connect to the Lifeline by dialing 988, this dialing code will be available to everyone across the [place] starting on July 16, 2022.     988      Lifeline Chat    Lifeline Chat is a service of the National Suicide Prevention Lifeline, connecting individuals with counselors for emotional support and other services via web chat. All chat centers in the Lifeline network are accredited by [employer]. Lifeline Chat is available 24/7 across the [place]    https://suicidepreventionlifeline.org/chat/

## 2022-06-16 NOTE — PROGRESS NOTES
Outpatient Psychiatry Follow-Up Visit (MD/NP)    6/16/2022    Clinical Status of Patient:  Outpatient (Ambulatory)    Chief Complaint:  Annie Mason is a 66 y.o. female who presents today for follow-up of anxiety and psychosis.  Met with patient and daughter.      Interval History and Content of Current Session:  Interim Events/Subjective Report/Content of Current Session:  Patient reports she is doing well with tolerating the cars following her.  Continues with delusional thoughts including her phone is tapped, and the cars are just following her still.  States her mother recently passed away on mother's Day this year and she and her family are getting along okay.  Reports she is sleeping well and is working at [employer] from 11:00 p.m. to 7:00 a.m..  Patient does admit to some mouth and lip movement since being on the medication, although this is not seen during the interview.  Reports good sleep.  Reports fair appetite.    04/14/2022:  Patient recently graduated from [facility] program.  Continues to see people following her while in her car.  However is able to use coping skills and ignore people that are following her in her car.  Reports improved mood.  Plans to resume therapy with Pooja Stephens LCSW  Reports starting working part-time recently.  Reports good sleep.  Reports good appetite.    01/12/2022:  Reports she is coming upon graduating IOP program.  Continues to see people following her at times, however is able to ignore them most of of the time.  Additionally has been more open to speaking to people in public.  Does report some daytime tiredness.  Otherwise denies noticeable side effects of medication.  Reports good sleep.  Reports good appetite.    10/06/2021:  Patient reports she feels she is doing much better.  Is attending her group therapy.  Additionally attending therapy sessions 1 on 1 with  Pooja Stephens.  Denies current concerns.  Denies noticeable side effects  "of medication.    8/06/2021: Patient reports continued decreasing concern about delusions of people following her.  Continues to feel that people are following her but has decreased anxiety related to it.  Denies recent side effects from medications.    7/23/2021: Patient recently hospitalized at [facility] in [facility] psych unit for delusional disorder.  Released on Wednesday.  Medication change to Risperdal 1 mg by mouth nightly and trazodone 50 mg by mouth nightly.  Patient reports less concern with people following her.  However does admit that she "knows that they are actually following her" she is just less concerned and is able to distract herself by reading.    6/30/2021: Continues with paranoia.  Believes people at birthday party stated she was committing for Medicaid fraud.  Later that same day she saw people in a convenience store and felt like they said other going to kill her .  Continues to be concerned with vehicles on the road following her.  Reports decreased anxiety at times while on [employer].  States she is continuing to go to [employer], but otherwise not doing anything.            Patient  reviewed this visit.     Review of Systems   · PSYCHIATRIC: Pertinant items are noted in the narrative.    Past Medical, Family and Social History: The patient's past medical, family and social history have been reviewed and updated as appropriate within the electronic medical record - see encounter notes.    Compliance: yes    Side effects: see above    Risk Parameters:  Patient reports no suicidal ideation  Patient reports no homicidal ideation  Patient reports no self-injurious behavior  Patient reports no violent behavior    Exam (detailed: at least 9 elements; comprehensive: all 15 elements)     GAD7 6/16/2022 4/14/2022 1/12/2022   1. Feeling nervous, anxious, or on edge? 1 0 1   2. Not being able to stop or control worrying? 0 1 1   3. Worrying too much about different things? 1 0 1 " "  4. Trouble relaxing? 0 1 1   5. Being so restless that it is hard to sit still? 0 0 1   6. Becoming easily annoyed or irritable? 0 0 1   7. Feeling afraid as if something awful might happen? 1 3 1   8. If you checked off any problems, how difficult have these problems made it for you to do your work, take care of things at home, or get along with other people? 0 0 1   EVER-7 Score 3 5 7     PHQ9 6/16/2022   Little interest or pleasure in doing things: Not at all   Feeling down, depressed or hopeless: Several days   Trouble falling asleep, staying asleep, or sleeping too much: Not at all   Feeling tired or having little energy: Several days   Poor appetite or overeating: Not at all   Feeling bad about yourself- or that you are a failure or have let yourself or family down Not at all   Trouble concentrating on things, such as reading the newspaper or watching television: Several days   Moving or speaking so slowly that other people could have noticed. Or the opposite- being so fidgety or restless that you have been moving around a lot more than usual: Not at all   Thoughts that you would be better off dead or hurting yourself in some way: Not at all   If you indicated you have experienced any of the aforementioned problems, how difficult have these problems made it for you to do your work, take care of things at home or get along with other people? Not difficult at all   Total Score 3       Constitutional  Vitals:  Most recent vital signs, dated less than 90 days prior to this appointment, were reviewed.   Vitals:    06/16/22 1315   BP: (!) 150/84   Pulse: 68   Weight: 97.4 kg (214 lb 11.7 oz)   Height: 5' 3" (1.6 m)        General:  age appropriate, obese     Musculoskeletal  Muscle Strength/Tone:  no rigidity, no cogwheeling, no tremor, no tic   Gait & Station:  sitting     Psychiatric  Speech:  no latency; no press   Mood & Affect:  steady  congruent and appropriate   Thought Process:  normal and logical "   Associations:  intact   Thought Content:  delusions: yes, paranoid   Insight:  has awareness of illness   Judgement: behavior is adequate to circumstances   Orientation:  grossly intact   Memory: intact for content of interview   Language: grossly intact   Attention Span & Concentration:  able to focus   Fund of Knowledge:  intact and appropriate to age and level of education     Assessment and Diagnosis   Status/Progress: Based on the examination today, the patient's problem(s) is/are well controlled.  New problems have been presented today.   Co-morbidities and Side effects are complicating management of the primary condition.  There are no active rule-out diagnoses for this patient at this time.     General Impression:  Patient doing well overall.  Continues with delusions of people following her at times, however has significantly improved tolerability.  Additionally lost mother recently.  Does report some concerns of mouth and lip movements, although not seen.  Discussed decreasing risperidone.  Discussed risks versus benefits of decreasing risperidone.  Patient agreeable to current treatment plan.  Patient denies suicidal ideations, homicidal ideation, thoughts of self-harm, and hallucinations.      ICD-10-CM ICD-9-CM   1. Paranoid schizophrenia  F20.0 295.30   2. Anxiety  F41.9 300.00   3. Insomnia due to other mental disorder  F51.05 300.9    F99 327.02       Intervention/Counseling/Treatment Plan   · Medication Management: The risks and benefits of medication were discussed with the patient.  · Counseling provided with patient and family as follows: importance of compliance with chosen treatment options was emphasized, risks and benefits of treatment options, including medications, were discussed with the patient, prognosis, patient and family education, instructions for  management, treatment and follow-up were reviewed   · Discussed risks of tardive dyskinesia, drug induced parkinsonism, metabolic side  effects, including weight gain, neuroleptic malignant syndrome. Patient verbalized understanding.   · Discuss black box warning stating 1st generation and 2nd generation antipsychotics are not approved for dementia-related psychosis and have an increase mortality risk in elderly dementia patients often due to cardiovascular or infectious events. Patient verbalized understanding.   · Discussed with patient informed consent, risks vs. benefits, alternative treatments, side effect profile and the inherent unpredictability of individual responses to these treatments. The patient expresses understanding of the above and displays the capacity to agree with this current plan and had no other questions.    Medications:   Continue Risperdal 0.25 mg by mouth every morning.  Decrease Risperdal to 0.5 mg by mouth every night.      Return to Clinic: 3 months, as needed     Patient instructed to please go to emergency department if feeling as though you are going to harm to yourself or others or if you are in crisis; or to please call the clinic to report any worsening of symptoms or problems associated with medication.     A portion of this note was created using rumr: turn off the lights voice recognition software that occasionally misinterprets phrases or words.

## 2022-06-30 ENCOUNTER — OFFICE VISIT (OUTPATIENT)
Dept: ENDOCRINOLOGY | Facility: CLINIC | Age: 67
End: 2022-06-30
Payer: MEDICARE

## 2022-06-30 ENCOUNTER — HOSPITAL ENCOUNTER (OUTPATIENT)
Dept: RADIOLOGY | Facility: HOSPITAL | Age: 67
Discharge: HOME | End: 2022-06-30
Attending: INTERNAL MEDICINE
Payer: MEDICARE

## 2022-06-30 VITALS
WEIGHT: 216.25 LBS | HEIGHT: 63 IN | DIASTOLIC BLOOD PRESSURE: 72 MMHG | SYSTOLIC BLOOD PRESSURE: 130 MMHG | BODY MASS INDEX: 38.32 KG/M2 | OXYGEN SATURATION: 98 % | HEART RATE: 64 BPM

## 2022-06-30 DIAGNOSIS — E11.65 TYPE 2 DIABETES MELLITUS WITH HYPERGLYCEMIA, WITH LONG-TERM CURRENT USE OF INSULIN: Primary | ICD-10-CM

## 2022-06-30 DIAGNOSIS — E03.9 ACQUIRED HYPOTHYROIDISM: ICD-10-CM

## 2022-06-30 DIAGNOSIS — E21.3 HYPERPARATHYROIDISM: ICD-10-CM

## 2022-06-30 DIAGNOSIS — M85.859 OSTEOPENIA OF NECK OF FEMUR, UNSPECIFIED LATERALITY: ICD-10-CM

## 2022-06-30 DIAGNOSIS — E66.01 CLASS 2 SEVERE OBESITY DUE TO EXCESS CALORIES WITH SERIOUS COMORBIDITY AND BODY MASS INDEX (BMI) OF 38.0 TO 38.9 IN ADULT: ICD-10-CM

## 2022-06-30 DIAGNOSIS — I10 ESSENTIAL HYPERTENSION: ICD-10-CM

## 2022-06-30 DIAGNOSIS — E78.2 MIXED HYPERLIPIDEMIA: ICD-10-CM

## 2022-06-30 DIAGNOSIS — Z79.4 TYPE 2 DIABETES MELLITUS WITH HYPERGLYCEMIA, WITH LONG-TERM CURRENT USE OF INSULIN: Primary | ICD-10-CM

## 2022-06-30 PROCEDURE — 99215 OFFICE O/P EST HI 40 MIN: CPT | Mod: PBBFAC,PO | Performed by: INTERNAL MEDICINE

## 2022-06-30 PROCEDURE — 76536 US EXAM OF HEAD AND NECK: CPT | Mod: 26,,, | Performed by: RADIOLOGY

## 2022-06-30 PROCEDURE — 99214 PR OFFICE/OUTPT VISIT, EST, LEVL IV, 30-39 MIN: ICD-10-PCS | Mod: S$PBB,,, | Performed by: INTERNAL MEDICINE

## 2022-06-30 PROCEDURE — 99999 PR PBB SHADOW E&M-EST. PATIENT-LVL V: CPT | Mod: PBBFAC,,, | Performed by: INTERNAL MEDICINE

## 2022-06-30 PROCEDURE — 76536 US EXAM OF HEAD AND NECK: CPT | Mod: TC,PO

## 2022-06-30 PROCEDURE — 76536 US SOFT TISSUE HEAD NECK THYROID: ICD-10-PCS | Mod: 26,,, | Performed by: RADIOLOGY

## 2022-06-30 PROCEDURE — 99999 PR PBB SHADOW E&M-EST. PATIENT-LVL V: ICD-10-PCS | Mod: PBBFAC,,, | Performed by: INTERNAL MEDICINE

## 2022-06-30 PROCEDURE — 99214 OFFICE O/P EST MOD 30 MIN: CPT | Mod: S$PBB,,, | Performed by: INTERNAL MEDICINE

## 2022-06-30 RX ORDER — INSULIN LISPRO 100 [IU]/ML
INJECTION, SOLUTION INTRAVENOUS; SUBCUTANEOUS
Qty: 45 ML | Refills: 5 | Status: SHIPPED | OUTPATIENT
Start: 2022-06-30 | End: 2022-07-19

## 2022-06-30 RX ORDER — INSULIN GLARGINE 100 [IU]/ML
INJECTION, SOLUTION SUBCUTANEOUS
Qty: 45 ML | Refills: 5 | Status: SHIPPED | OUTPATIENT
Start: 2022-06-30 | End: 2022-06-30

## 2022-06-30 RX ORDER — LEVOTHYROXINE SODIUM 50 UG/1
50 TABLET ORAL DAILY
Qty: 90 TABLET | Refills: 3 | Status: SHIPPED | OUTPATIENT
Start: 2022-06-30 | End: 2023-07-10

## 2022-06-30 RX ORDER — PEN NEEDLE, DIABETIC 30 GX3/16"
NEEDLE, DISPOSABLE MISCELLANEOUS
Qty: 400 EACH | Refills: 3 | Status: SHIPPED | OUTPATIENT
Start: 2022-06-30 | End: 2022-11-11

## 2022-06-30 NOTE — PROGRESS NOTES
Subjective:    Patient ID:  Annie Mason is a 66 y.o. female.    Chief Complaint:  Diabetes        Pt presents to follow up for type 2 diabetes, HPT, osteopenia, and review of chronic medical conditions as listed in the visit diagnosis section of this encounter.     PMH also significant for ? myasthenia gravis.      Overview: Onset of Type 2 diabetes mellitus was < 10 years ago. Has history of CVA but no other macrovascular complications. No microvascular complications. No history of DKA.   Cardiovascular risk factors: advanced age (older than 55 for men, 65 for women), diabetes mellitus, dyslipidemia, hypertension and sedentary lifestyle. No hx of DR per pt. No history of pancreatitis or MEN syndrome.      Was hospitalized for visual and auditory hallucinations for 2 weeks and started on Risperdal. Notes BG trended up since then. She was contacted regarding her medications and the insurance required a few brand changes. Was sent onglyza but did not take it. Received Humalog instead of Novolog.       Currently taking:  Humalog 11 units with b/d and 12 units with lunch.    Lantus 40 units at night  Onglyza 5 mg once daily. No hx of pancreatitis or MEN syndrome.      Home blood sugar records:   Fasting   Pre lunch   Pre-dinner   Bedtime -      Did not tolerate SGLT-2i, GLP-1, or metformin.  In the past: Took metformin. Read an article that metformin can cause spots on her legs and stopped taking. Has one spot on her R lateral leg that has been more tender than usual.   Started Farxiga and took about 1 week. Had dizziness and polyuria so she stopped.  Tried Bydureon but had severe nausea and stopped taking.      Last eye exam <1 yr ago and no DPR per pt. Has +ve dry eye after cataract surgery      Plans to keep working on portion size. Is trying to eat more meat and veggies. Has reduced pasta/rice/and carbs.  Sometimes snacks between meals. Typically eats 3 meals a day. Notes often gets  "hungry around 9 pm. Snacks on tuna fish with crackers, fruit (banana, watermelon, cantaloupe), popcorn, low carb chips, and vanilla wafers.       Walks for exercise every day to every other day.   Any episodes of hypoglycemia? Yes. Occurs randomly at different times of day. Get's symptomatic with shaking, sweating, feeling weak when BG <70.     Still having hallucinations. Feels like cars are following her and driving towards her with their lights on. She "knows these things are real." Is not interested in taking more psych meds.                  Diabetes Management Status    Statin: Taking  ACE/ARB: Not taking    Screening or Prevention Patient's value Goal Complete/Controlled?   HgA1C Testing and Control   Lab Results   Component Value Date    HGBA1C 7.5 (H) 05/30/2022      Annually/Less than 8% Yes   Lipid profile : 09/22/2021 Annually Yes   LDL control Lab Results   Component Value Date    LDLCALC 75.8 09/22/2021    Annually/Less than 100 mg/dl  Yes   Nephropathy screening Lab Results   Component Value Date    LABMICR 10.0 02/16/2022     Lab Results   Component Value Date    PROTEINUA Negative 03/08/2021    Annually Yes   Blood pressure BP Readings from Last 1 Encounters:   07/08/22 (!) 150/82    Less than 140/90 Yes   Dilated retinal exam : 06/27/2022 Annually Yes   Foot exam   : 02/23/2022 Annually Yes       Review of Systems   Gastrointestinal: Negative for abdominal pain.   Musculoskeletal: Negative for arthralgias.   Neurological: Positive for weakness.        Past Medical History:   Diagnosis Date    Allergy     Anticoagulant long-term use     Brachial plexitis     Depression     Diabetes mellitus, type 2     GERD (gastroesophageal reflux disease)     History of vitamin D deficiency     Hyperlipidemia 2/4/2015    Hypertension     Hypothyroidism     MG (myasthenia gravis)     Mild intermittent asthma with acute exacerbation 12/26/2017    Myasthenia gravis without exacerbation 3/25/2015    " "Seizures     "years ago"    Stroke     2008      Social History     Tobacco Use    Smoking status: Never Smoker    Smokeless tobacco: Never Used   Substance Use Topics    Alcohol use: No    Drug use: No     Family History   Problem Relation Age of Onset    Diabetes Mother     Hypertension Mother     Hypertension Father     Stroke Father     Ovarian cancer Maternal Aunt     Breast cancer Maternal Aunt         age unknown    Myasthenia gravis Paternal Aunt     Diabetes type I Son     Breast cancer Sister 64      Past Surgical History:   Procedure Laterality Date    CATARACT EXTRACTION Left 09/2021    CHOLECYSTECTOMY      CORONARY ANGIOGRAPHY N/A 1/28/2019    Procedure: ANGIOGRAM, CORONARY ARTERY;  Surgeon: Ike Jasso III, MD;  Location: [facility] CATH;  Service: Cardiology;  Laterality: N/A;    LEFT HEART CATHETERIZATION Left 1/28/2019    Procedure: Left heart cath;  Surgeon: Ike Jasso III, MD;  Location: [facility] CATH;  Service: Cardiology;  Laterality: Left;    PORTACATH PLACEMENT  12/14/2016    thalmusectomy      TUBAL LIGATION            Current Outpatient Medications:     albuterol (PROVENTIL) 2.5 mg /3 mL (0.083 %) nebulizer solution, Take 2.5 mg by nebulization every 6 (six) hours as needed for Wheezing. Rescue, Disp: , Rfl:     albuterol sulfate (PROAIR RESPICLICK) 90 mcg/actuation inhaler, Inhale 180 mcg into the lungs every 4 (four) hours. Rescue, Disp: , Rfl:     aspirin (ECOTRIN) 81 MG EC tablet, Take 81 mg by mouth once daily., Disp: , Rfl:     atorvastatin (LIPITOR) 80 MG tablet, Take 1 tablet (80 mg total) by mouth once daily., Disp: 90 tablet, Rfl: 3    BD VEO INSULIN SYRINGE UF 0.3 mL 31 gauge x 15/64" Syrg, USE TO INJECT NOVOLOG 3 TIMES DAILY, Disp: , Rfl: 3    blood pressure monitor Kit, Use to check blood pressure daily, Disp: , Rfl: 0    blood sugar diagnostic (ONETOUCH VERIO TEST STRIPS) Strp, Check blood sugar 4 times daily.  DX E11.65., Disp: 400 strip, Rfl: 11    " blood sugar diagnostic Strp, To check BG 3 times daily, to use with insurance preferred meter. Dx code - E11.65, Disp: 300 strip, Rfl: 11    blood-glucose meter kit, Please check blood sugar four times daily and as needed. Re: Diabetes E11.65, Disp: 1 each, Rfl: 1    blood-glucose meter kit, To check BG 3 times daily, to use with insurance preferred meter. Dx code - E11.65, Disp: 1 each, Rfl: 0    compress.stocking,knee,reg,med Misc, 1 Pair by Misc.(Non-Drug; Combo Route) route once daily. 15-20 mmHg, Disp: 2 each, Rfl: 1    cyanocobalamin (VITAMIN B-12) 100 MCG tablet, Take 50 mcg by mouth once daily., Disp: , Rfl:     diclofenac sodium (VOLTAREN) 1 % Gel, Apply 2 g topically 3 (three) times daily as needed (pain)., Disp: 100 g, Rfl: 0    ergocalciferol (ERGOCALCIFEROL) 50,000 unit Cap, TAKE 1 CAPSULE BY MOUTH 1 TIME EVERY WEEK, Disp: 12 capsule, Rfl: 1    fluocinonide 0.05% (LIDEX) 0.05 % cream, Apply topically 2 (two) times daily., Disp: 1 each, Rfl: 3    fluticasone (FLONASE) 50 mcg/actuation nasal spray, 2 sprays each  nostril bid x 1 week then q day prn  nasal congestion or sneezing, Disp: 1 Bottle, Rfl: 3    fluticasone furoate-vilanteroL (BREO ELLIPTA) 200-25 mcg/dose DsDv diskus inhaler, INHALE 1 PUFF BY MOUTH INTO LUNGS ONCE EVERY DAY, Disp: 180 each, Rfl: 3    furosemide (LASIX) 40 MG tablet, TAKE 1 TABLET BY MOUTH EVERY DAY, USE ADDITIONAL 40MG AS NEEDED WEIGHT GAIN> 3 LBS INCREASED SHORTNESS OF BREATH, Disp: 90 tablet, Rfl: 1    hydrOXYzine HCL (ATARAX) 25 MG tablet, Take 1 tablet (25 mg total) by mouth every 6 (six) hours as needed for Itching., Disp: 360 tablet, Rfl: 0    lancets (ONETOUCH DELICA LANCETS) 33 gauge Misc, Use four times daily., Disp: 400 each, Rfl: 3    lancets Misc, To check BG 3 times daily, to use with insurance preferred meter. Dx code - E11.65, Disp: 300 each, Rfl: 3    loratadine (CLARITIN) 10 mg tablet, Take 1 tablet (10 mg total) by mouth once daily., Disp: 30  "tablet, Rfl: 2    multivitamin (THERAGRAN) per tablet, Take 1 tablet by mouth once daily., Disp: , Rfl:     nystatin (MYCOSTATIN) cream, Apply topically 2 (two) times daily., Disp: 30 g, Rfl: 0    ondansetron (ZOFRAN-ODT) 4 MG TbDL, Take 4 mg by mouth every 6 (six) hours as needed., Disp: , Rfl:     potassium chloride SA (K-DUR,KLOR-CON) 20 MEQ tablet, TAKE 1 TABLET BY MOUTH EVERY DAY, Disp: 90 tablet, Rfl: 1    pyridostigmine (MESTINON) 60 mg Tab, Take 1 tablet (60 mg total) by mouth every 6 (six) hours. Take  3 to 4 times a day for symptoms of MG., Disp: 360 tablet, Rfl: 3    risperiDONE (RISPERDAL) 0.25 MG Tab, Take 1 tablet (0.25 mg total) by mouth every morning., Disp: 90 tablet, Rfl: 1    SAXagliptin (ONGLYZA) 5 mg Tab tablet, Take 1 tablet (5 mg total) by mouth once daily., Disp: 90 tablet, Rfl: 3    spironolactone (ALDACTONE) 25 MG tablet, TAKE 1 TABLET BY MOUTH EVERY DAY, Disp: 90 tablet, Rfl: 3    XIIDRA 5 % Dpet, Place 1 drop into both eyes 2 (two) times daily., Disp: , Rfl:     HYDROcodone-acetaminophen (NORCO) 5-325 mg per tablet, Take 1 tablet by mouth every 6 (six) hours as needed for Pain., Disp: 10 tablet, Rfl: 0    insulin (LANTUS SOLOSTAR U-100 INSULIN) glargine 100 units/mL SubQ pen, Take 40 units subQ daily., Disp: 45 mL, Rfl: 4    insulin lispro (HUMALOG KWIKPEN INSULIN) 100 unit/mL pen, Take 11 units with breakfast and dinner. Take 12 units with lunch.  Max daily dose 60 units/day, Disp: 45 mL, Rfl: 5    levothyroxine (SYNTHROID) 50 MCG tablet, Take 1 tablet (50 mcg total) by mouth once daily., Disp: 90 tablet, Rfl: 3    liraglutide 0.6 mg/0.1 mL, 18 mg/3 mL, subq PNIJ (VICTOZA 2-JESUS MANUEL) 0.6 mg/0.1 mL (18 mg/3 mL) PnIj pen, Inject 0.6 mg into the skin daily for 2 weeks then increase to 1.2 mg thereafter, Disp: 3 mL, Rfl: 11    pen needle, diabetic (BD ULTRA-FINE SHORT PEN NEEDLE) 31 gauge x 5/16" Ndle, Use 4x daily with insulin, Disp: 400 each, Rfl: 3     Review of patient's " "allergies indicates:   Allergen Reactions    Ramipril      Other reaction(s): caused a severe cough        Objective:   /72   Pulse 64   Ht 5' 3" (1.6 m)   Wt 98.1 kg (216 lb 4.3 oz)   SpO2 98%   BMI 38.31 kg/m²   BP Readings from Last 3 Encounters:   07/08/22 (!) 150/82   06/30/22 130/72   06/29/22 130/76     Wt Readings from Last 3 Encounters:   07/08/22 1231 97.5 kg (215 lb)   06/30/22 1013 98.1 kg (216 lb 4.3 oz)   06/29/22 1008 98 kg (216 lb)          Physical Exam  Vitals reviewed.   Constitutional:       General: She is not in acute distress.     Appearance: Normal appearance. She is well-developed. She is not ill-appearing, toxic-appearing or diaphoretic.   HENT:      Head: Normocephalic and atraumatic.   Eyes:      General: No scleral icterus.  Neck:      Trachea: No tracheal deviation.   Pulmonary:      Effort: Pulmonary effort is normal. No respiratory distress.   Neurological:      Mental Status: She is alert and oriented to person, place, and time.   Psychiatric:         Thought Content: Thought content normal.           Lab Results   Component Value Date     06/29/2022    K 3.8 06/29/2022     06/29/2022    CO2 29 06/29/2022    BUN 14 06/29/2022    CREATININE 0.63 06/29/2022    GLU 88 06/29/2022    HGBA1C 7.5 (H) 05/30/2022    MG 1.8 01/13/2022    AST 79 (H) 06/29/2022    AST 44 (H) 03/05/2016    ALT 36 (H) 06/29/2022    ALBUMIN 3.8 06/29/2022    PROT 6.7 06/29/2022    BILITOT 0.5 06/29/2022    WBC 6.81 06/29/2022    HGB 12.3 06/29/2022    HCT 38.5 06/29/2022    MCV 96 06/29/2022    MCH 30.7 06/29/2022     06/29/2022    MPV 10.4 06/29/2022    GRAN 4.2 06/29/2022    GRAN 62.1 06/29/2022    LYMPH 2.0 06/29/2022    LYMPH 28.6 06/29/2022    CHOL 138 09/22/2021    HDL 51 09/22/2021    LDLCALC 75.8 09/22/2021    TRIG 56 09/22/2021       Lab Results   Component Value Date    TSH 1.051 05/30/2022    FREET4 0.63 (L) 05/30/2022        Thyroid Labs Latest Ref Rng & Units 1/13/2022 " 5/30/2022 6/29/2022   TSH 0.400 - 4.000 uIU/mL - 1.051 -   Free T4 0.71 - 1.51 ng/dL - 0.63(L) -   Sodium 136 - 145 mmol/L 141 140 141   Potassium 3.5 - 5.1 mmol/L 3.8 3.6 3.8   Chloride 95 - 110 mmol/L 106 105 109   Carbon Dioxide 22 - 31 mmol/L 25 24 29   Glucose 70 - 110 mg/dL 128(H) 205(H) 88   Blood Urea Nitrogen 7 - 18 mg/dL 10 16 14   Creatinine 0.50 - 1.40 mg/dL 0.7 0.8 0.63   Calcium 8.4 - 10.2 mg/dL 10.0 9.5 9.5   Total Protein 6.0 - 8.4 g/dL - - 6.7   Albumin 3.5 - 5.2 g/dL - - 3.8   Total Bilirubin 0.2 - 1.3 mg/dL - - 0.5   AST 14 - 36 U/L - - 79(H)   ALT 0 - 35 U/L - - 36(H)   Anion Gap 8 - 16 mmol/L 10 11 3(L)   eGFR (African American) >60 mL/min/1.73 m:2 >60.0 >60.0 >60   eGFR (Non-African American) >60 mL/min/1.73 m:2 >60.0 >60.0 >60   WBC 3.90 - 12.70 K/uL - - 6.81   RBC 4.00 - 5.40 M/uL - - 4.01   Hemoglobin 12.0 - 16.0 g/dL - - 12.3   Hematocrit 37.0 - 48.5 % - - 38.5   MCV 82 - 98 fL - - 96   MCH 27.0 - 31.0 pg - - 30.7   MCHC 32.0 - 36.0 g/dL - - 31.9(L)   RDW 11.5 - 14.5 % - - 13.9   Platelets 150 - 450 K/uL - - 223   MPV 9.2 - 12.9 fL - - 10.4   Gran # 1.8 - 7.7 K/uL - - 4.2   Lymph # 1.0 - 4.8 K/uL - - 2.0   Mono # 0.3 - 1.0 K/uL - - 0.4   Eos # 0.0 - 0.5 K/uL - - 0.2   Baso # 0.00 - 0.20 K/uL - - 0.03   Gran % 38.0 - 73.0 % - - 62.1   Lymph % 18.0 - 48.0 % - - 28.6   Mono% 4.0 - 15.0 % - - 6.2   Eos % 0.0 - 8.0 % - - 2.6   Baso % 0.0 - 1.9 % - - 0.4   INR - - - -           Hemoglobin A1C   Date Value Ref Range Status   05/30/2022 7.5 (H) 4.0 - 5.6 % Final     Comment:     ADA Screening Guidelines:  5.7-6.4%  Consistent with prediabetes  >or=6.5%  Consistent with diabetes    High levels of fetal hemoglobin interfere with the HbA1C  assay. Heterozygous hemoglobin variants (HbS, HgC, etc)do  not significantly interfere with this assay.   However, presence of multiple variants may affect accuracy.     02/16/2022 7.2 (H) 4.0 - 5.6 % Final     Comment:     ADA Screening Guidelines:  5.7-6.4%   Consistent with prediabetes  >or=6.5%  Consistent with diabetes    High levels of fetal hemoglobin interfere with the HbA1C  assay. Heterozygous hemoglobin variants (HbS, HgC, etc)do  not significantly interfere with this assay.   However, presence of multiple variants may affect accuracy.     09/22/2021 7.2 (H) 4.0 - 5.6 % Final     Comment:     ADA Screening Guidelines:  5.7-6.4%  Consistent with prediabetes  >or=6.5%  Consistent with diabetes    High levels of fetal hemoglobin interfere with the HbA1C  assay. Heterozygous hemoglobin variants (HbS, HgC, etc)do  not significantly interfere with this assay.   However, presence of multiple variants may affect accuracy.           Assessment and plan:     Problem List Items Addressed This Visit        Cardiac/Vascular    Essential hypertension     BP controlled. Continue current meds. Pt to notify PCP if BP consistently more than 140/90.             Hyperlipidemia     Continue statin. Last LDL at goal. LFTs recently trended up. F/u with PCP.              Endocrine    Type 2 diabetes mellitus with hyperglycemia, with long-term current use of insulin - Primary     Pt with macrovascular complication of CVA.  Pt taking antipsychotics which can exacerbate BG.  HbA1C 7.5%.  Near goal. Goal is <7% if this can be obtained without hypoglycemia.  Pt with labile BG. Has some occasional low's. Discussed should keep food diary when BG is low or very high. May need less insulin with certain foods and more insulin with other foods.    Continue Lantus 40 units once daily.  Continue Humalog 11 units with b/d and 12 units with lunch. If pre-meal blood sugar less than 100, reduce by 2-5 units.   Pt willing to try GLP-1 again. Try once daily Victoza. Discussed potential adverse effects. Did not tolerate Bydureon in the past due to nausea. Also discussed the advantage of weight loss.  Continue Onglyza 5 mg once daily but stop taking if pt is able to get Victoza.   Reviewed how to correct  "hypoglycemia.    Did not tolerate SGLT-2i or metformin.  Check BG before meals and bedtime. Reviewed benefits of CGM but pt still not interested.  Last eye exam <1 yr ago and no DPR per pt.  Urine micro wnls.  Counseled pt on low carb/low fat diet and to increase physical activity.  On statin.                 Relevant Medications    insulin lispro (HUMALOG KWIKPEN INSULIN) 100 unit/mL pen    liraglutide 0.6 mg/0.1 mL, 18 mg/3 mL, subq PNIJ (VICTOZA 2-JESUS MANUEL) 0.6 mg/0.1 mL (18 mg/3 mL) PnIj pen    pen needle, diabetic (BD ULTRA-FINE SHORT PEN NEEDLE) 31 gauge x 5/16" Ndle    Hypothyroidism     Overall is clinically euthyroid. TSH wnls. Continue current dose of thyroid hormone. F/u with PCP.           Relevant Medications    levothyroxine (SYNTHROID) 50 MCG tablet    Hyperparathyroidism     Suspect  primary hyperparathyroidism  vit D 56, wnls  pth trended up to 180's  Prior 24 urine calcium 2020- 24 hr ca/cr ratio 0.0099 (U ca 5.9, U Cr 40, S ca 9.5, S Cr 0.59). Check gene test for FH  Last dexa of fem neck 2020 with osteopenia. Forearm BMD wnls. Repeat due now  Prior renal US 2020 with no stones  Do baseline thyroid US now  Had new onset psychotic episodes ? Related-Though calcium has been wnls. Has some constipation and other none specific sx's.     Discussed indications for surgery if primary hyperparathyroidism proven    Avoid dehydration, excessive calcium supplementation and meds (HCTZ)  that can worsen hypercalcemia.    Follow up after results reviewed. Pt is now willing to consider referral to ENT               Relevant Orders    US Soft Tissue Head Neck Thyroid (Completed)    Class 2 severe obesity due to excess calories with serious comorbidity and body mass index (BMI) of 38.0 to 38.9 in adult     BMI 38. Encouraged healthy low fat low carb diet and increase physical activity                Orthopedic    Osteopenia of neck of femur     Pt with osteopenia. Last dexa June 2020. ?if BMD exacerbated by elevated " parathyroid hormone. Discussed fall precautions. On vit D. Recommend calcium 1200 mg daily (preferrably from food sources). No indication for further intervention at this point. Forearm dexa was wnls. Repeat dexa due now and was ordered by PCP.                Thyroid ultrasound now    Schedule previously ordered DEXA scan    Do gene test for FHH    RTC in 3 months with any full endo provider          Disclaimer: This note has been generated using voice-recognition software. There may be typographical errors that have been missed during proof-reading.

## 2022-06-30 NOTE — ASSESSMENT & PLAN NOTE
Suspect  primary hyperparathyroidism  vit D 56, wnls  pth trended up to 180's  Prior 24 urine calcium 2020- 24 hr ca/cr ratio 0.0099 (U ca 5.9, U Cr 40, S ca 9.5, S Cr 0.59). Check gene test for FHH  Last dexa of fem neck 2020 with osteopenia. Forearm BMD wnls. Repeat due now  Prior renal US 2020 with no stones  Do baseline thyroid US now  Had new onset psychotic episodes ? Related-Though calcium has been wnls. Has some constipation and other none specific sx's.     Discussed indications for surgery if primary hyperparathyroidism proven    Avoid dehydration, excessive calcium supplementation and meds (HCTZ)  that can worsen hypercalcemia.    Follow up after results reviewed. Pt is now willing to consider referral to ENT

## 2022-06-30 NOTE — ASSESSMENT & PLAN NOTE
Pt with osteopenia. Last dexa June 2020. ?if BMD exacerbated by elevated parathyroid hormone. Discussed fall precautions. On vit D. Recommend calcium 1200 mg daily (preferrably from food sources). No indication for further intervention at this point. Forearm dexa was wnls. Repeat dexa due now and was ordered by PCP.

## 2022-06-30 NOTE — ASSESSMENT & PLAN NOTE
Overall is clinically euthyroid. TSH wnls. Continue current dose of thyroid hormone. F/u with PCP.

## 2022-06-30 NOTE — ASSESSMENT & PLAN NOTE
Pt with macrovascular complication of CVA.  Pt taking antipsychotics which can exacerbate BG.  HbA1C 7.5%.  Near goal. Goal is <7% if this can be obtained without hypoglycemia.  Pt with labile BG. Has some occasional low's. Discussed should keep food diary when BG is low or very high. May need less insulin with certain foods and more insulin with other foods.    Continue Lantus 40 units once daily.  Continue Humalog 11 units with b/d and 12 units with lunch. If pre-meal blood sugar less than 100, reduce by 2-5 units.   Pt willing to try GLP-1 again. Try once daily Victoza. Discussed potential adverse effects. Did not tolerate Bydureon in the past due to nausea. Also discussed the advantage of weight loss.  Continue Onglyza 5 mg once daily but stop taking if pt is able to get Victoza.   Reviewed how to correct hypoglycemia.    Did not tolerate SGLT-2i or metformin.  Check BG before meals and bedtime. Reviewed benefits of CGM but pt still not interested.  Last eye exam <1 yr ago and no DPR per pt.  Urine micro wnls.  Counseled pt on low carb/low fat diet and to increase physical activity.  On statin.

## 2022-07-01 PROBLEM — E11.51 TYPE 2 DIABETES MELLITUS WITH DIABETIC PERIPHERAL ANGIOPATHY WITHOUT GANGRENE, WITHOUT LONG-TERM CURRENT USE OF INSULIN: Status: ACTIVE | Noted: 2022-07-01

## 2022-08-23 NOTE — PATIENT INSTRUCTIONS

## 2022-09-15 ENCOUNTER — OFFICE VISIT (OUTPATIENT)
Dept: PSYCHIATRY | Facility: CLINIC | Age: 67
End: 2022-09-15
Payer: MEDICARE

## 2022-09-15 VITALS
WEIGHT: 202.63 LBS | HEIGHT: 63 IN | BODY MASS INDEX: 35.9 KG/M2 | HEART RATE: 54 BPM | DIASTOLIC BLOOD PRESSURE: 73 MMHG | SYSTOLIC BLOOD PRESSURE: 115 MMHG

## 2022-09-15 DIAGNOSIS — F51.05 INSOMNIA DUE TO OTHER MENTAL DISORDER: ICD-10-CM

## 2022-09-15 DIAGNOSIS — F99 INSOMNIA DUE TO OTHER MENTAL DISORDER: ICD-10-CM

## 2022-09-15 DIAGNOSIS — F20.0 PARANOID SCHIZOPHRENIA: Primary | ICD-10-CM

## 2022-09-15 DIAGNOSIS — F41.9 ANXIETY: ICD-10-CM

## 2022-09-15 PROCEDURE — 99999 PR PBB SHADOW E&M-EST. PATIENT-LVL V: CPT | Mod: PBBFAC,,, | Performed by: REGISTERED NURSE

## 2022-09-15 PROCEDURE — 99999 PR PBB SHADOW E&M-EST. PATIENT-LVL II: CPT | Mod: PBBFAC,,, | Performed by: SOCIAL WORKER

## 2022-09-15 PROCEDURE — 99999 PR PBB SHADOW E&M-EST. PATIENT-LVL V: ICD-10-PCS | Mod: PBBFAC,,, | Performed by: REGISTERED NURSE

## 2022-09-15 PROCEDURE — 90834 PR PSYCHOTHERAPY W/PATIENT, 45 MIN: ICD-10-PCS | Mod: ,,, | Performed by: SOCIAL WORKER

## 2022-09-15 PROCEDURE — 99215 OFFICE O/P EST HI 40 MIN: CPT | Mod: PBBFAC,27,PN | Performed by: REGISTERED NURSE

## 2022-09-15 PROCEDURE — 99214 OFFICE O/P EST MOD 30 MIN: CPT | Mod: S$PBB,,, | Performed by: REGISTERED NURSE

## 2022-09-15 PROCEDURE — 99212 OFFICE O/P EST SF 10 MIN: CPT | Mod: PBBFAC,PN | Performed by: SOCIAL WORKER

## 2022-09-15 PROCEDURE — 99214 PR OFFICE/OUTPT VISIT, EST, LEVL IV, 30-39 MIN: ICD-10-PCS | Mod: S$PBB,,, | Performed by: REGISTERED NURSE

## 2022-09-15 PROCEDURE — 90834 PSYTX W PT 45 MINUTES: CPT | Mod: ,,, | Performed by: SOCIAL WORKER

## 2022-09-15 PROCEDURE — 99999 PR PBB SHADOW E&M-EST. PATIENT-LVL II: ICD-10-PCS | Mod: PBBFAC,,, | Performed by: SOCIAL WORKER

## 2022-09-15 RX ORDER — OMEPRAZOLE 40 MG/1
40 CAPSULE, DELAYED RELEASE ORAL DAILY
COMMUNITY
Start: 2022-09-14 | End: 2024-02-09

## 2022-09-15 NOTE — PROGRESS NOTES
Individual Psychotherapy (PhD/LCSW)    9/15/2022    Site:  [facility]         Therapeutic Intervention: Met with patient.  Outpatient - Insight oriented psychotherapy 45 min - CPT code 30215, Outpatient - Behavior modifying psychotherapy 45 min - CPT code 41364, and Outpatient - Supportive psychotherapy 45 min - CPT Code 14082    Chief complaint/reason for encounter: psychosis             Interval history and content of current session: Client was referred to treatment by Rodolfo CARMICHAEL to address anxiety and psychosis.  Client arrived to session on time and was fully engaged.  She continues to report that she is still working, driving herself around, and is involved in her [employer] again. She continues to have fixed delusion about cars coming after her. She reported that she is still working and doing well. She reported that she is compliant with her medications. 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 She reported that when the cars come after her, she tells herself encouraging thoughts and she prays and that helps her. SW will follow up with ct's daughter before next session for collateral. Ct to continue in 1:1 session.       Treatment plan:  Target symptoms: psychosis  Why chosen therapy is appropriate versus another modality: relevant to diagnosis, patient responds to this modality, evidence based practice  Outcome monitoring methods: self-report  Therapeutic intervention type: insight oriented psychotherapy, behavior modifying psychotherapy, supportive psychotherapy    Risk parameters:  Patient reports no suicidal ideation  Patient reports no homicidal ideation  Patient reports no self-injurious behavior  Patient reports no violent behavior          CSSRS was completed: No risk    Mental Status Exam:  General Appearance:  unremarkable, age appropriate   Speech: normal tone, normal rate, normal pitch, normal volume      Level of Cooperation: cooperative       Thought Processes: normal   Mood: steady, happy      Thought Content: normal, no suicidality, no homicidality, delusions, or paranoia   Affect: appropriate   Orientation: Oriented x3   Memory: recent >  intact   Attention Span & Concentration: intact   Fund of General Knowledge: intact and appropriate to age and level of education   Abstract Reasoning: interpretation of similarities was abstract   Judgment & Insight: good, fair, Ct has some insight, she continues to have fixed delusion about cars following her.      Language intact             Verbal deficits: None    Patient's response to intervention:  The patient's response to intervention is accepting.    Progress toward goals and other mental status changes:  The patient's progress toward goals is fair , good.    Diagnosis:     ICD-10-CM ICD-9-CM   1. Paranoid schizophrenia  F20.0 295.30       Plan:  individual psychotherapy and Ct to continue to see Rodolfo marquez psych med mgt  Pt to go to ED or call 911 if symptoms worsen or if she has thoughts of harming self and/or others. Pt verbalized understanding.    Return to clinic: as scheduled    Length of Service (minutes): 45      Each patient to whom he or she provides medical services by telemedicine is: (1) informed of the relationship between the physician and patient and the respective role of any other health care provider with respect to management of the patient; and (2) notified that he or she may decline to receive medical services by telemedicine and may withdraw from such care at any time.

## 2022-09-15 NOTE — PROGRESS NOTES
Outpatient Psychiatry Follow-Up Visit (MD/NP)    9/15/2022    Clinical Status of Patient:  Outpatient (Ambulatory)    Chief Complaint:  Annie Mason is a 66 y.o. female who presents today for follow-up of anxiety and psychosis.  Met with patient and daughter.      Interval History and Content of Current Session:  Interim Events/Subjective Report/Content of Current Session:  Patient reports doing well overall.  States she continues to have people following her when she leaves her home.  However does not allowed delusions to bother her to the point of affecting her day-to-day life.  Continues to work night shift.  Denies noticeable side effects of medications.  Reports good sleep.  Reports good appetite.    06/16/2022:  Patient reports she is doing well with tolerating the cars following her.  Continues with delusional thoughts including her phone is tapped, and the cars are just following her still.  States her mother recently passed away on mother's Day this year and she and her family are getting along okay.  Reports she is sleeping well and is working at [employer] from 11:00 p.m. to 7:00 a.m..  Patient does admit to some mouth and lip movement since being on the medication, although this is not seen during the interview.  Reports good sleep.  Reports fair appetite.    04/14/2022:  Patient recently graduated from [facility] program.  Continues to see people following her while in her car.  However is able to use coping skills and ignore people that are following her in her car.  Reports improved mood.  Plans to resume therapy with Pooja Stephens LCSW  Reports starting working part-time recently.  Reports good sleep.  Reports good appetite.    01/12/2022:  Reports she is coming upon graduating IOP program.  Continues to see people following her at times, however is able to ignore them most of of the time.  Additionally has been more open to speaking to people in public.  Does report some daytime  "tiredness.  Otherwise denies noticeable side effects of medication.  Reports good sleep.  Reports good appetite.    10/06/2021:  Patient reports she feels she is doing much better.  Is attending her group therapy.  Additionally attending therapy sessions 1 on 1 with  Pooja Stephens.  Denies current concerns.  Denies noticeable side effects of medication.    8/06/2021: Patient reports continued decreasing concern about delusions of people following her.  Continues to feel that people are following her but has decreased anxiety related to it.  Denies recent side effects from medications.    7/23/2021: Patient recently hospitalized at [facility] in [facility] psych unit for delusional disorder.  Released on Wednesday.  Medication change to Risperdal 1 mg by mouth nightly and trazodone 50 mg by mouth nightly.  Patient reports less concern with people following her.  However does admit that she "knows that they are actually following her" she is just less concerned and is able to distract herself by reading.    6/30/2021: Continues with paranoia.  Believes people at [employer] party stated she was committing for Medicaid fraud.  Later that same day she saw people in a convenience store and felt like they said other going to kill her .  Continues to be concerned with vehicles on the road following her.  Reports decreased anxiety at times while on [employer].  States she is continuing to go to Religion, but otherwise not doing anything.      Patient  reviewed this visit.     Review of Systems   PSYCHIATRIC: Pertinant items are noted in the narrative.    Past Medical, Family and Social History: The patient's past medical, family and social history have been reviewed and updated as appropriate within the electronic medical record - see encounter notes.    Compliance: yes    Side effects: see above    Risk Parameters:  Patient reports no suicidal ideation  Patient reports no homicidal " ideation  Patient reports no self-injurious behavior  Patient reports no violent behavior    Exam (detailed: at least 9 elements; comprehensive: all 15 elements)     GAD7 9/15/2022 6/16/2022 4/14/2022   1. Feeling nervous, anxious, or on edge? 0 1 0   2. Not being able to stop or control worrying? 0 0 1   3. Worrying too much about different things? 1 1 0   4. Trouble relaxing? 0 0 1   5. Being so restless that it is hard to sit still? 0 0 0   6. Becoming easily annoyed or irritable? 0 0 0   7. Feeling afraid as if something awful might happen? 1 1 3   8. If you checked off any problems, how difficult have these problems made it for you to do your work, take care of things at home, or get along with other people? 0 0 0   EVER-7 Score 2 3 5     PHQ9 9/15/2022   Little interest or pleasure in doing things: Not at all   Feeling down, depressed or hopeless: Not at all   Trouble falling asleep, staying asleep, or sleeping too much: Nearly every day   Feeling tired or having little energy: Several days   Poor appetite or overeating: Not at all   Feeling bad about yourself- or that you are a failure or have let yourself or family down Not at all   Trouble concentrating on things, such as reading the newspaper or watching television: Several days   Moving or speaking so slowly that other people could have noticed. Or the opposite- being so fidgety or restless that you have been moving around a lot more than usual: Not at all   Thoughts that you would be better off dead or hurting yourself in some way: Not at all   If you indicated you have experienced any of the aforementioned problems, how difficult have these problems made it for you to do your work, take care of things at home or get along with other people? Not difficult at all   Total Score 5       Constitutional  Vitals:  Most recent vital signs, dated less than 90 days prior to this appointment, were reviewed.   Vitals:    09/15/22 1039   BP: 115/73   Pulse: (!) 54  "  Weight: 91.9 kg (202 lb 9.6 oz)   Height: 5' 3" (1.6 m)        General:  age appropriate, obese     Musculoskeletal  Muscle Strength/Tone:  no rigidity, no cogwheeling, no tremor, no tic   Gait & Station:  non-ataxic     Psychiatric  Speech:  no latency; no press   Mood & Affect:  steady  congruent and appropriate   Thought Process:  normal and logical   Associations:  intact   Thought Content:  delusions: yes, paranoid   Insight:  has awareness of illness   Judgement: behavior is adequate to circumstances   Orientation:  grossly intact   Memory: intact for content of interview   Language: grossly intact   Attention Span & Concentration:  able to focus   Fund of Knowledge:  intact and appropriate to age and level of education     Assessment and Diagnosis   Status/Progress: Based on the examination today, the patient's problem(s) is/are well controlled.  New problems have been presented today.   Co-morbidities and Side effects  are complicating management of the primary condition.  There are no active rule-out diagnoses for this patient at this time.     General Impression:  Patient doing well overall.  Continues with delusions of people following her at times, however continues with significantly improved tolerability.  Denies noticeable side effects of medications.  Discussed decreasing risperidone.  Discussed risks versus benefits of decreasing risperidone.  Patient agreeable to current treatment plan.  Patient denies suicidal ideations, homicidal ideation, thoughts of self-harm, and hallucinations.      ICD-10-CM ICD-9-CM   1. Paranoid schizophrenia  F20.0 295.30   2. Anxiety  F41.9 300.00   3. Insomnia due to other mental disorder  F51.05 300.9    F99 327.02         Intervention/Counseling/Treatment Plan   Medication Management: The risks and benefits of medication were discussed with the patient.  Counseling provided with patient and family as follows: importance of compliance with chosen treatment options " was emphasized, risks and benefits of treatment options, including medications, were discussed with the patient, prognosis, patient and family education, instructions for  management, treatment and follow-up were reviewed   Discussed risks of tardive dyskinesia, drug induced parkinsonism, metabolic side effects, including weight gain, neuroleptic malignant syndrome. Patient verbalized understanding.   Discuss black box warning stating 1st generation and 2nd generation antipsychotics are not approved for dementia-related psychosis and have an increase mortality risk in elderly dementia patients often due to cardiovascular or infectious events. Patient verbalized understanding.   Discussed with patient informed consent, risks vs. benefits, alternative treatments, side effect profile and the inherent unpredictability of individual responses to these treatments. The patient expresses understanding of the above and displays the capacity to agree with this current plan and had no other questions.    Medications:   Continue Risperdal 0.25 mg by mouth twice daily.      Return to Clinic: 3 months, as needed     Patient instructed to please go to emergency department if feeling as though you are going to harm to yourself or others or if you are in crisis; or to please call the clinic to report any worsening of symptoms or problems associated with medication.     A portion of this note was created using [employer] voice recognition software that occasionally misinterprets phrases or words.

## 2022-09-15 NOTE — PATIENT INSTRUCTIONS
Continue Risperdal 0.25 mg by mouth twice daily.             Please go to emergency department if feeling as though you are going to harm to yourself or others or if you are in crisis.     Please call the clinic to report any worsening of symptoms or problems associated with medication.      National Suicide Prevention Lifeline    The Lifeline provides 24/7, free and confidential support for people in distress, prevention and crisis resources for you or your loved ones, and best practices for professionals in the [place].    5-945-001-6635    988 has been designated as the new three-digit dialing code that will route callers to the National Suicide Prevention Lifeline. While some areas may be currently able to connect to the Lifeline by dialing 988, this dialing code will be available to everyone across the [place] starting on July 16, 2022.     988      Lifeline Chat    Lifeline Chat is a service of the National Suicide Prevention Lifeline, connecting individuals with counselors for emotional support and other services via web chat. All chat centers in the Lifeline network are accredited by [employer]. Lifeline Chat is available 24/7 across the [place]    https://suicidepreventionlifeline.org/chat/

## 2022-10-18 PROBLEM — J44.9 OBSTRUCTIVE LUNG DISEASE: Status: ACTIVE | Noted: 2022-10-18

## 2022-10-28 ENCOUNTER — OFFICE VISIT (OUTPATIENT)
Dept: ENDOCRINOLOGY | Facility: CLINIC | Age: 67
End: 2022-10-28
Payer: MEDICARE

## 2022-10-28 VITALS
HEIGHT: 63 IN | SYSTOLIC BLOOD PRESSURE: 118 MMHG | TEMPERATURE: 98 F | HEART RATE: 71 BPM | WEIGHT: 205.69 LBS | OXYGEN SATURATION: 99 % | BODY MASS INDEX: 36.45 KG/M2 | DIASTOLIC BLOOD PRESSURE: 70 MMHG

## 2022-10-28 DIAGNOSIS — E11.51 TYPE 2 DIABETES MELLITUS WITH DIABETIC PERIPHERAL ANGIOPATHY WITHOUT GANGRENE, WITHOUT LONG-TERM CURRENT USE OF INSULIN: Primary | ICD-10-CM

## 2022-10-28 DIAGNOSIS — E21.3 HYPERPARATHYROIDISM: ICD-10-CM

## 2022-10-28 DIAGNOSIS — E03.4 HYPOTHYROIDISM DUE TO ACQUIRED ATROPHY OF THYROID: ICD-10-CM

## 2022-10-28 DIAGNOSIS — E55.9 HYPOVITAMINOSIS D: ICD-10-CM

## 2022-10-28 DIAGNOSIS — M85.859 OSTEOPENIA OF NECK OF FEMUR, UNSPECIFIED LATERALITY: ICD-10-CM

## 2022-10-28 DIAGNOSIS — Z78.0 POSTMENOPAUSAL: ICD-10-CM

## 2022-10-28 PROCEDURE — 99213 OFFICE O/P EST LOW 20 MIN: CPT | Mod: S$PBB,,, | Performed by: PHYSICIAN ASSISTANT

## 2022-10-28 PROCEDURE — 99213 PR OFFICE/OUTPT VISIT, EST, LEVL III, 20-29 MIN: ICD-10-PCS | Mod: S$PBB,,, | Performed by: PHYSICIAN ASSISTANT

## 2022-10-28 PROCEDURE — 99999 PR PBB SHADOW E&M-EST. PATIENT-LVL V: ICD-10-PCS | Mod: PBBFAC,,, | Performed by: PHYSICIAN ASSISTANT

## 2022-10-28 PROCEDURE — 99215 OFFICE O/P EST HI 40 MIN: CPT | Mod: PBBFAC,PO | Performed by: PHYSICIAN ASSISTANT

## 2022-10-28 PROCEDURE — 99999 PR PBB SHADOW E&M-EST. PATIENT-LVL V: CPT | Mod: PBBFAC,,, | Performed by: PHYSICIAN ASSISTANT

## 2022-10-28 RX ORDER — ERGOCALCIFEROL 1.25 MG/1
CAPSULE ORAL
Qty: 12 CAPSULE | Refills: 3 | Status: SHIPPED | OUTPATIENT
Start: 2022-10-28 | End: 2023-08-25 | Stop reason: SDUPTHER

## 2022-10-28 NOTE — PROGRESS NOTES
Patient ID:  Annie Mason is a 66 y.o. female.    Chief Complaint:      Patient ID:  Annie Mason is a 66 y.o. female.     Chief Complaint:  Diabetes      Pt presents to follow up for type 2 diabetes, HPT, osteopenia, and review of chronic medical conditions as listed in the visit diagnosis section of this encounter.      PMH also significant for myasthenia gravis. Previously seen by Dr. Sandifer in .     Onset of Type 2 diabetes mellitus was < 10 years ago.   Hx of CVA but no other macrovascular complications. No microvascular complications. No history of DKA.   Cardiovascular risk factors: advanced age (older than 55 for men, 65 for women), diabetes mellitus, dyslipidemia, hypertension and sedentary lifestyle. No hx of DR per pt. No history of pancreatitis or MEN syndrome.      Was hospitalized for visual and auditory hallucinations for 2 weeks and started on Risperdal. Notes BG trended up since then. She was contacted regarding her medications and the insurance required a few brand changes.        Currently taking:  Humalog 11 units with b/d and 12 units with lunch.    Lantus 40 units at night  Onglyza 5 mg once daily. No hx of pancreatitis or MEN syndrome.       Home blood sugar records:   Fastins  LH: 100s  DN: 160s    Did not tolerate SGLT-2i, GLP-1, or metformin.    Previous meds:  Metformin-Read an article that metformin can cause spots on her legs and stopped taking. She had a tender spot on her right leg.  Farxiga-dizziness, polyuria  Bydrueon-severe nausea     Eye exam in  and no DPR per pt. Has +ve dry eye after cataract surgery      Plans to keep working on portion size. Is trying to eat more meat and veggies. Has reduced carbs. Sometimes snacks between meals. Typically eats 3 meals a day. Snacks ~9 pm. Snacks on tuna fish with crackers, fruit (banana, watermelon, cantaloupe), popcorn, low carb chips, and vanilla wafers.       Walks for exercise qod.   Any episodes of  "hypoglycemia? none     Still having hallucinations. Feels like cars are following her and driving towards her with their lights on. She "knows these things are real." Is not interested in taking more psych meds.     Nodular Thyroid Disease  Thyroid u/s 6/22 showed a subcm nodule in the right thyroid. No sob, dysphagia or voice changes.    Diabetes Management Status    Statin: Taking  ACE/ARB: Not taking    Screening or Prevention Patient's value Goal Complete/Controlled?   HgA1C Testing and Control   Lab Results   Component Value Date    HGBA1C 7.5 (H) 05/30/2022      Annually/Less than 8% Yes   Lipid profile : 09/22/2021 Annually Yes   LDL control Lab Results   Component Value Date    LDLCALC 75.8 09/22/2021    Annually/Less than 100 mg/dl  Yes   Nephropathy screening Lab Results   Component Value Date    LABMICR 10.0 02/16/2022     Lab Results   Component Value Date    PROTEINUA Negative 03/08/2021    Annually Yes   Blood pressure BP Readings from Last 1 Encounters:   10/28/22 118/70    Less than 140/90 Yes   Dilated retinal exam : 06/27/2022 Annually Yes   Foot exam   : 02/23/2022 Annually Yes       Review of Systems   Gastrointestinal:  Negative for abdominal pain.   Musculoskeletal:  Negative for arthralgias.   Neurological:  Positive for weakness.      Past Medical History:   Diagnosis Date    Allergy     Anticoagulant long-term use     Brachial plexitis     Depression     Diabetes mellitus, type 2     GERD (gastroesophageal reflux disease)     History of vitamin D deficiency     Hyperlipidemia 02/04/2015    Hypertension     Hypothyroidism     MG (myasthenia gravis)     Mild intermittent asthma with acute exacerbation 12/26/2017    Myasthenia gravis without exacerbation 03/25/2015    Seizures     "years ago"    Sleep apnea     not compliant with machine    Stroke     2008      Social History     Tobacco Use    Smoking status: Never    Smokeless tobacco: Never   Substance Use Topics    Alcohol use: No    Drug " "use: No     Family History   Problem Relation Age of Onset    Kidney disease Mother     Diabetes Mother     Hypertension Mother     Hypertension Father     Stroke Father     Breast cancer Sister 64    Diabetes type I Son     Ovarian cancer Maternal Aunt     Breast cancer Maternal Aunt         age unknown    Myasthenia gravis Paternal Aunt       Past Surgical History:   Procedure Laterality Date    CATARACT EXTRACTION Left 09/2021    CATHETERIZATION OF BOTH LEFT AND RIGHT HEART N/A 7/22/2022    Procedure: CATHETERIZATION, HEART, BOTH LEFT AND RIGHT;  Surgeon: Ike Jasso III, MD;  Location: [facility] CATH;  Service: Cardiology;  Laterality: N/A;    CHOLECYSTECTOMY      CORONARY ANGIOGRAPHY N/A 01/28/2019    Procedure: ANGIOGRAM, CORONARY ARTERY;  Surgeon: Ike Jasso III, MD;  Location: [facility] CATH;  Service: Cardiology;  Laterality: N/A;    LEFT HEART CATHETERIZATION Left 01/28/2019    Procedure: Left heart cath;  Surgeon: Ike Jasso III, MD;  Location: [facility] CATH;  Service: Cardiology;  Laterality: Left;    PORTACATH PLACEMENT  12/14/2016    thalmusectomy      TUBAL LIGATION        Review of patient's allergies indicates:   Allergen Reactions    Ramipril      Other reaction(s): caused a severe cough        Objective:   /70 (BP Location: Left arm, Patient Position: Sitting, BP Method: Large (Manual))   Pulse 71   Temp 98.4 °F (36.9 °C) (Oral)   Ht 5' 3" (1.6 m)   Wt 93.3 kg (205 lb 11 oz)   SpO2 99%   BMI 36.44 kg/m²   BP Readings from Last 3 Encounters:   10/28/22 118/70   10/18/22 122/70   07/22/22 117/62     Wt Readings from Last 3 Encounters:   10/28/22 1415 93.3 kg (205 lb 11 oz)   10/18/22 0954 93.4 kg (205 lb 14.4 oz)   07/22/22 0727 96.6 kg (213 lb)        Physical Exam  Vitals reviewed.   Constitutional:       General: She is not in acute distress.     Appearance: Normal appearance. She is well-developed. She is not ill-appearing, toxic-appearing or diaphoretic.   HENT:      Head: " Normocephalic and atraumatic.   Eyes:      General: No scleral icterus.  Neck:      Trachea: No tracheal deviation.   Pulmonary:      Effort: Pulmonary effort is normal. No respiratory distress.   Neurological:      Mental Status: She is alert and oriented to person, place, and time.   Psychiatric:         Thought Content: Thought content normal.         Lab Results   Component Value Date     07/22/2022    K 3.8 07/22/2022     07/22/2022    CO2 27 07/22/2022    BUN 18 07/22/2022    CREATININE 0.78 07/22/2022     (H) 07/22/2022    HGBA1C 7.5 (H) 05/30/2022    MG 1.8 01/13/2022    AST 37 (H) 07/22/2022    AST 44 (H) 03/05/2016    ALT 26 07/22/2022    ALBUMIN 4.2 07/22/2022    PROT 7.4 07/22/2022    BILITOT 0.4 07/22/2022    WBC 6.48 07/22/2022    HGB 12.7 07/22/2022    HCT 38.7 07/22/2022    MCV 93 07/22/2022    MCH 30.5 07/22/2022     07/22/2022    MPV 10.1 07/22/2022    GRAN 4.2 06/29/2022    GRAN 62.1 06/29/2022    LYMPH 2.0 06/29/2022    LYMPH 28.6 06/29/2022    CHOL 138 09/22/2021    HDL 51 09/22/2021    LDLCALC 75.8 09/22/2021    TRIG 56 09/22/2021       Lab Results   Component Value Date    TSH 1.051 05/30/2022    FREET4 0.63 (L) 05/30/2022        Thyroid Labs Latest Ref Rng & Units 5/30/2022 6/29/2022 7/22/2022   TSH 0.400 - 4.000 uIU/mL 1.051 - -   Free T4 0.71 - 1.51 ng/dL 0.63(L) - -   Sodium 136 - 145 mmol/L 140 141 139   Potassium 3.5 - 5.1 mmol/L 3.6 3.8 3.8   Chloride 95 - 110 mmol/L 105 109 106   Carbon Dioxide 22 - 31 mmol/L 24 29 27   Glucose 70 - 110 mg/dL 205(H) 88 169(H)   Blood Urea Nitrogen 7 - 18 mg/dL 16 14 18   Creatinine 0.50 - 1.40 mg/dL 0.8 0.63 0.78   Calcium 8.4 - 10.2 mg/dL 9.5 9.5 9.6   Total Protein 6.0 - 8.4 g/dL - 6.7 7.4   Albumin 3.5 - 5.2 g/dL - 3.8 4.2   Total Bilirubin 0.2 - 1.3 mg/dL - 0.5 0.4   AST 14 - 36 U/L - 79(H) 37(H)   ALT 0 - 35 U/L - 36(H) 26   Anion Gap 8 - 16 mmol/L 11 3(L) 6(L)   eGFR (African American) >60 mL/min/1.73 m:2 >60.0 >60 >60    eGFR (Non-African American) >60 mL/min/1.73 m:2 >60.0 >60 >60   WBC 3.90 - 12.70 K/uL - 6.81 6.48   RBC 4.00 - 5.40 M/uL - 4.01 4.17   Hemoglobin 12.0 - 16.0 g/dL - 12.3 12.7   Hematocrit 37.0 - 48.5 % - 38.5 38.7   MCV 82 - 98 fL - 96 93   MCH 27.0 - 31.0 pg - 30.7 30.5   MCHC 32.0 - 36.0 g/dL - 31.9(L) 32.8   RDW 11.5 - 14.5 % - 13.9 13.5   Platelets 150 - 450 K/uL - 223 229   MPV 9.2 - 12.9 fL - 10.4 10.1   Gran # 1.8 - 7.7 K/uL - 4.2 -   Lymph # 1.0 - 4.8 K/uL - 2.0 -   Mono # 0.3 - 1.0 K/uL - 0.4 -   Eos # 0.0 - 0.5 K/uL - 0.2 -   Baso # 0.00 - 0.20 K/uL - 0.03 -   Gran % 38.0 - 73.0 % - 62.1 -   Lymph % 18.0 - 48.0 % - 28.6 -   Mono% 4.0 - 15.0 % - 6.2 -   Eos % 0.0 - 8.0 % - 2.6 -   Baso % 0.0 - 1.9 % - 0.4 -   INR - - - -           Hemoglobin A1C   Date Value Ref Range Status   05/30/2022 7.5 (H) 4.0 - 5.6 % Final     Comment:     ADA Screening Guidelines:  5.7-6.4%  Consistent with prediabetes  >or=6.5%  Consistent with diabetes    High levels of fetal hemoglobin interfere with the HbA1C  assay. Heterozygous hemoglobin variants (HbS, HgC, etc)do  not significantly interfere with this assay.   However, presence of multiple variants may affect accuracy.     02/16/2022 7.2 (H) 4.0 - 5.6 % Final     Comment:     ADA Screening Guidelines:  5.7-6.4%  Consistent with prediabetes  >or=6.5%  Consistent with diabetes    High levels of fetal hemoglobin interfere with the HbA1C  assay. Heterozygous hemoglobin variants (HbS, HgC, etc)do  not significantly interfere with this assay.   However, presence of multiple variants may affect accuracy.     09/22/2021 7.2 (H) 4.0 - 5.6 % Final     Comment:     ADA Screening Guidelines:  5.7-6.4%  Consistent with prediabetes  >or=6.5%  Consistent with diabetes    High levels of fetal hemoglobin interfere with the HbA1C  assay. Heterozygous hemoglobin variants (HbS, HgC, etc)do  not significantly interfere with this assay.   However, presence of multiple variants may affect  accuracy.       EXAMINATION:  US SOFT TISSUE HEAD NECK THYROID     CLINICAL HISTORY:  H/o HPT. Look for parathyroid adenoma;.  Hyperparathyroidism, unspecified     TECHNIQUE:  Ultrasound of the thyroid and cervical lymph nodes was performed.     COMPARISON:  None.     FINDINGS:  The thyroid is normal in size. Right lobe of the thyroid measures 3.2 x 1.7 x 1.4 cm with an estimated volume of 4.1 mL.  Left lobe of the thyroid measures 4.1 x 1.0 x 1.6 cm with an estimated volume of 3.4 mL.  Isthmus measures 0.2 cm in thickness.  Total thyroid volume measures 7.5 mL.     Single complex solid and cystic nodule in the upper pole of the right thyroid lobe, measuring 0.6 x 0.5 x 0.7 cm, which is not meet criteria fine-needle aspiration.  No other thyroid or parathyroid nodules demonstrated.     Normal thyroid perfusion.     No pathologically enlarged cervical lymph nodes.     Impression:     1. Single subcentimeter right thyroid lobe nodule, which does not meet criteria for fine-needle aspiration.  2. No parathyroid nodules demonstrated.        Electronically signed by: Kvng Negron  Date:                                            06/30/2022  Time:                                           13:43    Assessment and plan:      T2DM     Pt with macrovascular complication of CVA.  Pt taking antipsychotics which can exacerbate BG. Declines CGM.   HbA1C today. Goal is <7% if this can be obtained without hypoglycemia.  Pt with labile BG. Has some hyperglycemia. Continue current regimen.       Reviewed how to correct hypoglycemia.     Did not tolerate SGLT-2i or metformin.  Check BG before meals and bedtime. Reviewed benefits of CGM but pt still not interested.    Urine micro wnls.  Counseled pt on low carb/low fat diet and to increase physical activity.  On statin.              Hypothyroidism     Overall is clinically euthyroid. TFTs today. Continue current dose of thyroid hormone. F/u with PCP.            Hyperparathyroidism      Suspect  primary hyperparathyroidism  vit D 56, wnls  pth trended up to 180's  Prior 24 urine calcium 2020- 24 hr ca/cr ratio 0.0099 (U ca 5.9, U Cr 40, S ca 9.5, S Cr 0.59). Check gene test for FHH  Last dexa of fem neck 2020 with osteopenia. Forearm BMD wnls. Repeat due now  Prior renal US 2020 with no stones  Had new onset psychotic episodes ? Related-Though calcium has been wnls. Has some constipation and other none specific sx's.      Discussed indications for surgery if primary hyperparathyroidism proven     Avoid dehydration, excessive calcium supplementation and meds (HCTZ)  that can worsen hypercalcemia.     Follow up after results reviewed. Pt is now willing to consider referral to ENT            Relevant Orders   US Soft Tissue Head Neck Thyroid (Completed)   Class 2 severe obesity due to excess calories with serious comorbidity and body mass index (BMI) of 38.0 to 38.9 in adult     BMI 38. Encouraged healthy low fat low carb diet and increase physical activity.                  Orthopedic   Osteopenia of neck of femur     Pt with osteopenia. Last dexa June 2020. ?if BMD exacerbated by elevated parathyroid hormone. Discussed fall precautions. On vit D. Recommend calcium 1200 mg daily (preferrably from food sources). No indication for further intervention at this point. Forearm dexa was wnls. Repeat dexa scan.       Nodular Thyroid disease  Stable-Repeat u/s in 6/23.    Check vd        Referral to podiatry  Fasting labs  Dexa scan  RTC in 3 months in [place] w/ Dr. Hutchinson

## 2022-10-28 NOTE — PATIENT INSTRUCTIONS
Decrease Lantus to 35 units nightly. Continue Humalog and Onglyza.   
Abnormal VS & WBC/Abnormal Lactate

## 2022-11-02 ENCOUNTER — LAB VISIT (OUTPATIENT)
Dept: LAB | Facility: HOSPITAL | Age: 67
End: 2022-11-02
Payer: MEDICARE

## 2022-11-02 DIAGNOSIS — E21.3 HYPERPARATHYROIDISM: ICD-10-CM

## 2022-11-02 DIAGNOSIS — E11.51 TYPE 2 DIABETES MELLITUS WITH DIABETIC PERIPHERAL ANGIOPATHY WITHOUT GANGRENE, WITHOUT LONG-TERM CURRENT USE OF INSULIN: ICD-10-CM

## 2022-11-02 DIAGNOSIS — E55.9 HYPOVITAMINOSIS D: ICD-10-CM

## 2022-11-02 LAB
25(OH)D3+25(OH)D2 SERPL-MCNC: 29 NG/ML (ref 30–96)
ALBUMIN SERPL BCP-MCNC: 3.7 G/DL (ref 3.5–5.2)
ALBUMIN/CREAT UR: 6.5 UG/MG (ref 0–30)
ALP SERPL-CCNC: 72 U/L (ref 55–135)
ALT SERPL W/O P-5'-P-CCNC: 24 U/L (ref 10–44)
ANION GAP SERPL CALC-SCNC: 11 MMOL/L (ref 8–16)
AST SERPL-CCNC: 33 U/L (ref 10–40)
BILIRUB SERPL-MCNC: 0.7 MG/DL (ref 0.1–1)
BUN SERPL-MCNC: 12 MG/DL (ref 8–23)
CA-I BLDV-SCNC: 1.29 MMOL/L (ref 1.06–1.42)
CALCIUM SERPL-MCNC: 9.8 MG/DL (ref 8.7–10.5)
CHLORIDE SERPL-SCNC: 103 MMOL/L (ref 95–110)
CHOLEST SERPL-MCNC: 114 MG/DL (ref 120–199)
CHOLEST/HDLC SERPL: 2.5 {RATIO} (ref 2–5)
CO2 SERPL-SCNC: 25 MMOL/L (ref 23–29)
CREAT SERPL-MCNC: 0.8 MG/DL (ref 0.5–1.4)
CREAT UR-MCNC: 155 MG/DL (ref 15–325)
EST. GFR  (NO RACE VARIABLE): >60 ML/MIN/1.73 M^2
ESTIMATED AVG GLUCOSE: 183 MG/DL (ref 68–131)
GLUCOSE SERPL-MCNC: 84 MG/DL (ref 70–110)
HBA1C MFR BLD: 8 % (ref 4–5.6)
HDLC SERPL-MCNC: 45 MG/DL (ref 40–75)
HDLC SERPL: 39.5 % (ref 20–50)
LDLC SERPL CALC-MCNC: 60.6 MG/DL (ref 63–159)
MICROALBUMIN UR DL<=1MG/L-MCNC: 10 UG/ML
NONHDLC SERPL-MCNC: 69 MG/DL
POTASSIUM SERPL-SCNC: 3.5 MMOL/L (ref 3.5–5.1)
PROT SERPL-MCNC: 6.9 G/DL (ref 6–8.4)
PTH-INTACT SERPL-MCNC: 165.4 PG/ML (ref 9–77)
SODIUM SERPL-SCNC: 139 MMOL/L (ref 136–145)
T4 FREE SERPL-MCNC: 0.73 NG/DL (ref 0.71–1.51)
TRIGL SERPL-MCNC: 42 MG/DL (ref 30–150)
TSH SERPL DL<=0.005 MIU/L-ACNC: 0.49 UIU/ML (ref 0.4–4)

## 2022-11-02 PROCEDURE — 84439 ASSAY OF FREE THYROXINE: CPT | Performed by: PHYSICIAN ASSISTANT

## 2022-11-02 PROCEDURE — 83036 HEMOGLOBIN GLYCOSYLATED A1C: CPT | Performed by: PHYSICIAN ASSISTANT

## 2022-11-02 PROCEDURE — 82570 ASSAY OF URINE CREATININE: CPT | Performed by: PHYSICIAN ASSISTANT

## 2022-11-02 PROCEDURE — 80061 LIPID PANEL: CPT | Performed by: PHYSICIAN ASSISTANT

## 2022-11-02 PROCEDURE — 36415 COLL VENOUS BLD VENIPUNCTURE: CPT | Mod: PO | Performed by: PHYSICIAN ASSISTANT

## 2022-11-02 PROCEDURE — 84443 ASSAY THYROID STIM HORMONE: CPT | Performed by: PHYSICIAN ASSISTANT

## 2022-11-02 PROCEDURE — 82043 UR ALBUMIN QUANTITATIVE: CPT | Performed by: PHYSICIAN ASSISTANT

## 2022-11-02 PROCEDURE — 83970 ASSAY OF PARATHORMONE: CPT | Performed by: PHYSICIAN ASSISTANT

## 2022-11-02 PROCEDURE — 82330 ASSAY OF CALCIUM: CPT | Performed by: PHYSICIAN ASSISTANT

## 2022-11-02 PROCEDURE — 80053 COMPREHEN METABOLIC PANEL: CPT | Performed by: PHYSICIAN ASSISTANT

## 2022-11-02 PROCEDURE — 82306 VITAMIN D 25 HYDROXY: CPT | Performed by: PHYSICIAN ASSISTANT

## 2022-11-07 DIAGNOSIS — E21.3 HYPERPARATHYROIDISM: Primary | ICD-10-CM

## 2022-11-15 ENCOUNTER — TELEPHONE (OUTPATIENT)
Dept: ENDOCRINOLOGY | Facility: CLINIC | Age: 67
End: 2022-11-15
Payer: MEDICARE

## 2022-11-15 DIAGNOSIS — E55.9 HYPOVITAMINOSIS D: ICD-10-CM

## 2022-11-15 DIAGNOSIS — E83.51 HYPOCALCEMIA: Primary | ICD-10-CM

## 2022-11-15 RX ORDER — CALCITRIOL 0.25 UG/1
0.25 CAPSULE ORAL DAILY
Qty: 90 CAPSULE | Refills: 0 | Status: SHIPPED | OUTPATIENT
Start: 2022-11-15 | End: 2023-02-15

## 2022-11-22 ENCOUNTER — HOSPITAL ENCOUNTER (OUTPATIENT)
Dept: RADIOLOGY | Facility: HOSPITAL | Age: 67
Discharge: HOME | End: 2022-11-22
Attending: PHYSICIAN ASSISTANT
Payer: MEDICARE

## 2022-11-22 DIAGNOSIS — Z78.0 POSTMENOPAUSAL: ICD-10-CM

## 2022-11-22 PROCEDURE — 77080 DEXA BONE DENSITY SPINE HIP: ICD-10-PCS | Mod: 26,,, | Performed by: RADIOLOGY

## 2022-11-22 PROCEDURE — 77080 DXA BONE DENSITY AXIAL: CPT | Mod: TC,PO

## 2022-11-22 PROCEDURE — 77080 DXA BONE DENSITY AXIAL: CPT | Mod: 26,,, | Performed by: RADIOLOGY

## 2022-12-13 ENCOUNTER — TELEPHONE (OUTPATIENT)
Dept: PSYCHIATRY | Facility: CLINIC | Age: 67
End: 2022-12-13
Payer: MEDICARE

## 2022-12-13 NOTE — TELEPHONE ENCOUNTER
----- Message from Pooja Stephens LCSW sent at 12/13/2022  1:27 PM CST -----  Regarding: Annie CARRERA MRN [id number]  Contact: pt at [phone]  Hi Ms. DURAN,   Please see below. Will you get Ms. Valencia scheduled?    Please and Thank You  Pooja   ----- Message -----  From: Tanya Moon  Sent: 12/13/2022   1:17 PM CST  To: [facility] Staff, #    Type: Needs Medical Advice  Who Called:  Pt     Best Call Back Number: [phone]    Additional Information: Pt called in to get a re schedlude from the providers she would like the appointments to be the same day and back to back please call the pt to advise

## 2022-12-13 NOTE — TELEPHONE ENCOUNTER
Called and spoke to patient and scheduled her for appointment on 01/13/2023 @ 1030 AM with Rodolfo and @ 11 AM with Pooja

## 2023-01-09 ENCOUNTER — TELEPHONE (OUTPATIENT)
Dept: PSYCHIATRY | Facility: CLINIC | Age: 68
End: 2023-01-09

## 2023-01-09 NOTE — TELEPHONE ENCOUNTER
"Patient called clinic stating she needs to speak with provider. I advised her that he is with patients and asked if there is anything I can assist her with. Patient states "I just need him to call me." Patient would not tell me the nature of the call, just stating "I need to ask him a few questions." Advised her provider would likely be busy with patients till later today and then he would be able to return her call. Patient verbalized understanding.   "

## 2023-01-10 NOTE — TELEPHONE ENCOUNTER
Spoke with patient via telephone.  Patient reports concerns of son not doing well on current medication regimen through other providers office.  Discussed changing providers.  Advised patient to have sons primary care physician place referral to office.  Will discuss with scheduling staff.  No further concerns at this point.

## 2023-01-13 ENCOUNTER — OFFICE VISIT (OUTPATIENT)
Dept: PSYCHIATRY | Facility: CLINIC | Age: 68
End: 2023-01-13
Payer: MEDICARE

## 2023-01-13 VITALS
BODY MASS INDEX: 36.44 KG/M2 | SYSTOLIC BLOOD PRESSURE: 113 MMHG | HEART RATE: 75 BPM | HEIGHT: 63 IN | DIASTOLIC BLOOD PRESSURE: 66 MMHG

## 2023-01-13 DIAGNOSIS — F20.0 PARANOID SCHIZOPHRENIA: Primary | ICD-10-CM

## 2023-01-13 DIAGNOSIS — F41.9 ANXIETY: ICD-10-CM

## 2023-01-13 DIAGNOSIS — Z79.899 ENCOUNTER FOR LONG-TERM (CURRENT) USE OF OTHER MEDICATIONS: ICD-10-CM

## 2023-01-13 PROCEDURE — 99999 PR PBB SHADOW E&M-EST. PATIENT-LVL V: CPT | Mod: PBBFAC,,, | Performed by: REGISTERED NURSE

## 2023-01-13 PROCEDURE — 90834 PR PSYCHOTHERAPY W/PATIENT, 45 MIN: ICD-10-PCS | Mod: ,,, | Performed by: SOCIAL WORKER

## 2023-01-13 PROCEDURE — 99211 OFF/OP EST MAY X REQ PHY/QHP: CPT | Mod: PBBFAC,PN | Performed by: SOCIAL WORKER

## 2023-01-13 PROCEDURE — 99214 OFFICE O/P EST MOD 30 MIN: CPT | Mod: S$PBB,,, | Performed by: REGISTERED NURSE

## 2023-01-13 PROCEDURE — 99999 PR PBB SHADOW E&M-EST. PATIENT-LVL V: ICD-10-PCS | Mod: PBBFAC,,, | Performed by: REGISTERED NURSE

## 2023-01-13 PROCEDURE — 99214 PR OFFICE/OUTPT VISIT, EST, LEVL IV, 30-39 MIN: ICD-10-PCS | Mod: S$PBB,,, | Performed by: REGISTERED NURSE

## 2023-01-13 PROCEDURE — 99999 PR PBB SHADOW E&M-EST. PATIENT-LVL I: ICD-10-PCS | Mod: PBBFAC,,, | Performed by: SOCIAL WORKER

## 2023-01-13 PROCEDURE — 90834 PSYTX W PT 45 MINUTES: CPT | Mod: ,,, | Performed by: SOCIAL WORKER

## 2023-01-13 PROCEDURE — 99215 OFFICE O/P EST HI 40 MIN: CPT | Mod: PBBFAC,27,PN | Performed by: REGISTERED NURSE

## 2023-01-13 PROCEDURE — 99999 PR PBB SHADOW E&M-EST. PATIENT-LVL I: CPT | Mod: PBBFAC,,, | Performed by: SOCIAL WORKER

## 2023-01-13 NOTE — PROGRESS NOTES
Individual Psychotherapy (PhD/LCSW)    1/13/2023    Site:  [facility]         Therapeutic Intervention: Met with patient.  Outpatient - Insight oriented psychotherapy 45 min - CPT code 69467, Outpatient - Behavior modifying psychotherapy 45 min - CPT code 30167, and Outpatient - Supportive psychotherapy 45 min - CPT Code 57177    Chief complaint/reason for encounter: psychosis             Interval history and content of current session: Client was referred to treatment by Rodolfo CARMICHAEL to address anxiety and psychosis.  Client arrived to session on time and was fully engaged.  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  She reports that the cars still come after her but she continues to use the same coping skills that she is been using to mitigate the cars following her.  She reports that she prays, and that she confronts the cars.  Client reported that she is still taking her medicine but is unsure if she needs it.  She did report that the medication helps her to remain calm when the cars are after her.  Outside of the fixed delusion about the cars, client is linear and logical.  Client shared that she has concerns about her son who also struggles with mental illness and she is trying to get him some help.  Client reports that she still drives and that she is safe while driving.  She reported that her car is in the shop and she is in a rental car.  Client would like to come in once a month.  Client to continue in one-to-one sessions.      Treatment plan:  Target symptoms: psychosis  Why chosen therapy is appropriate versus another modality: relevant to diagnosis, patient responds to this modality, evidence based practice  Outcome monitoring methods: self-report  Therapeutic intervention type: insight oriented psychotherapy, behavior modifying psychotherapy, supportive psychotherapy    Risk parameters:  Patient reports no suicidal ideation  Patient reports no homicidal  ideation  Patient reports no self-injurious behavior  Patient reports no violent behavior    CSSRS was completed: No risk    Mental Status Exam:  General Appearance:  unremarkable, age appropriate   Speech: normal tone, normal rate, normal pitch, normal volume      Level of Cooperation: cooperative      Thought Processes: Normal and logical at times but still has fixed delusion of cars following her   Mood: steady      Thought Content: normal, no suicidality, no homicidality, delusions, or paranoia   Affect: congruent and appropriate   Orientation: Oriented x3   Memory: recent >  intact   Attention Span & Concentration: intact   Fund of General Knowledge: intact and appropriate to age and level of education   Abstract Reasoning: interpretation of similarities was abstract   Judgment & Insight: fair     Language intact       Verbal deficits: None    Patient's response to intervention:  The patient's response to intervention is accepting.    Progress toward goals and other mental status changes:  The patient's progress toward goals is fair , improved .    Diagnosis:     ICD-10-CM ICD-9-CM   1. Paranoid schizophrenia  F20.0 295.30       Plan:  individual psychotherapy and Ct to follow up with Rodolfo marquez psych med mgt  Pt to go to ED or call 911 if symptoms worsen or if she has thoughts of harming self and/or others. Pt verbalized understanding.    Return to clinic: as scheduled    Length of Service (minutes): 45      Each patient to whom he or she provides medical services by telemedicine is: (1) informed of the relationship between the physician and patient and the respective role of any other health care provider with respect to management of the patient; and (2) notified that he or she may decline to receive medical services by telemedicine and may withdraw from such care at any time.

## 2023-01-13 NOTE — PATIENT INSTRUCTIONS
Continue Risperdal 0.25 mg by mouth nightly.                 Please go to emergency department if feeling as though you are going to harm to yourself or others or if you are in crisis.     Please call the clinic to report any worsening of symptoms or problems associated with medication.      National Suicide Prevention Lifeline    The Lifeline provides 24/7, free and confidential support for people in distress, prevention and crisis resources for you or your loved ones, and best practices for professionals in the [place].    7-384-806-4670    988 has been designated as the new three-digit dialing code that will route callers to the National Suicide Prevention Lifeline. While some areas may be currently able to connect to the Lifeline by dialing 988, this dialing code will be available to everyone across the [place] starting on July 16, 2022.     988      Lifeline Chat    Lifeline Chat is a service of the National Suicide Prevention Lifeline, connecting individuals with counselors for emotional support and other services via web chat. All chat centers in the Lifeline network are accredited by [employer]. Lifeline Chat is available 24/7 across the [place]    https://suicidepreventionlifeline.org/chat/

## 2023-01-26 DIAGNOSIS — F20.0 PARANOID SCHIZOPHRENIA: ICD-10-CM

## 2023-01-26 RX ORDER — RISPERIDONE 0.25 MG/1
0.25 TABLET ORAL NIGHTLY
Qty: 90 TABLET | Refills: 1 | Status: ON HOLD | OUTPATIENT
Start: 2023-01-26 | End: 2024-02-09 | Stop reason: CLARIF

## 2023-01-26 NOTE — PROGRESS NOTES
Outpatient Psychiatry Follow-Up Visit (MD/NP)    1/13/2023    Clinical Status of Patient:  Outpatient (Ambulatory)    Chief Complaint:  Annie Mason is a 67 y.o. female who presents today for follow-up of anxiety and psychosis.  Met with patient and daughter.      Interval History and Content of Current Session:  Interim Events/Subjective Report/Content of Current Session:  Patient reports continued ability to tolerate people following her .  States she noticed recently that she was at the gas station and no one was there and within 5 minutes every pump was full.  Also recently began helping at a  and has argued with the DCFS checking on application and feels that they are trying to find something out about her.  States is it really related to the application.  Continues to pray about it frequently.  Does express worry about her son's increased isolation in bizarre behaviors.  States the people following her must be a big organization as they are all over daily.  Denies noticeable side effects of medication.  Reports fair to good sleep.  Reports fair to good appetite.    09/15/2022:  Patient reports doing well overall.  States she continues to have people following her when she leaves her home.  However does not allowed delusions to bother her to the point of affecting her day-to-day life.  Continues to work night shift.  Denies noticeable side effects of medications.  Reports good sleep.  Reports good appetite.    06/16/2022:  Patient reports she is doing well with tolerating the cars following her.  Continues with delusional thoughts including her phone is tapped, and the cars are just following her still.  States her mother recently passed away on mother's Day this year and she and her family are getting along okay.  Reports she is sleeping well and is working at [employer] from 11:00 p.m. to 7:00 a.m..  Patient does admit to some mouth and lip movement since being on the  "medication, although this is not seen during the interview.  Reports good sleep.  Reports fair appetite.    04/14/2022:  Patient recently graduated from [facility] program.  Continues to see people following her while in her car.  However is able to use coping skills and ignore people that are following her in her car.  Reports improved mood.  Plans to resume therapy with CLAUDE PolancoW  Reports starting working part-time recently.  Reports good sleep.  Reports good appetite.    01/12/2022:  Reports she is coming upon graduating IOP program.  Continues to see people following her at times, however is able to ignore them most of of the time.  Additionally has been more open to speaking to people in public.  Does report some daytime tiredness.  Otherwise denies noticeable side effects of medication.  Reports good sleep.  Reports good appetite.    10/06/2021:  Patient reports she feels she is doing much better.  Is attending her group therapy.  Additionally attending therapy sessions 1 on 1 with  Pooja Stephens.  Denies current concerns.  Denies noticeable side effects of medication.    8/06/2021: Patient reports continued decreasing concern about delusions of people following her.  Continues to feel that people are following her but has decreased anxiety related to it.  Denies recent side effects from medications.    7/23/2021: Patient recently hospitalized at [facility] in [facility] psych unit for delusional disorder.  Released on Wednesday.  Medication change to Risperdal 1 mg by mouth nightly and trazodone 50 mg by mouth nightly.  Patient reports less concern with people following her.  However does admit that she "knows that they are actually following her" she is just less concerned and is able to distract herself by reading.    6/30/2021: Continues with paranoia.  Believes people at birthday party stated she was committing for Medicaid fraud.  Later that same day she saw people in a " convenience store and felt like they said other going to kill her .  Continues to be concerned with vehicles on the road following her.  Reports decreased anxiety at times while on [employer].  States she is continuing to go to [employer], but otherwise not doing anything.      Patient  reviewed this visit.     Review of Systems   PSYCHIATRIC: Pertinant items are noted in the narrative.    Past Medical, Family and Social History: The patient's past medical, family and social history have been reviewed and updated as appropriate within the electronic medical record - see encounter notes.    Compliance: yes    Side effects: see above    Risk Parameters:  Patient reports no suicidal ideation  Patient reports no homicidal ideation  Patient reports no self-injurious behavior  Patient reports no violent behavior    Exam (detailed: at least 9 elements; comprehensive: all 15 elements)     GAD7 1/13/2023 9/15/2022 6/16/2022   1. Feeling nervous, anxious, or on edge? 0 0 1   2. Not being able to stop or control worrying? 0 0 0   3. Worrying too much about different things? 0 1 1   4. Trouble relaxing? 1 0 0   5. Being so restless that it is hard to sit still? 0 0 0   6. Becoming easily annoyed or irritable? 0 0 0   7. Feeling afraid as if something awful might happen? 1 1 1   8. If you checked off any problems, how difficult have these problems made it for you to do your work, take care of things at home, or get along with other people? 0 0 0   EVER-7 Score 2 2 3     PHQ9 1/13/2023   Little interest or pleasure in doing things: Not at all   Feeling down, depressed or hopeless: Not at all   Trouble falling asleep, staying asleep, or sleeping too much: Not at all   Feeling tired or having little energy: Several days   Poor appetite or overeating: Not at all   Feeling bad about yourself- or that you are a failure or have let yourself or family down Not at all   Trouble concentrating on things, such as reading the newspaper or  "watching television: Several days   Moving or speaking so slowly that other people could have noticed. Or the opposite- being so fidgety or restless that you have been moving around a lot more than usual: Not at all   Thoughts that you would be better off dead or hurting yourself in some way: Not at all   If you indicated you have experienced any of the aforementioned problems, how difficult have these problems made it for you to do your work, take care of things at home or get along with other people? Not difficult at all   Total Score 2       Constitutional  Vitals:  Most recent vital signs, dated less than 90 days prior to this appointment, were reviewed.   Vitals:    01/13/23 1036   BP: 113/66   Pulse: 75   Height: 5' 3" (1.6 m)        General:  age appropriate, obese     Musculoskeletal  Muscle Strength/Tone:  no rigidity, no cogwheeling, no tremor, no tic   Gait & Station:  non-ataxic     Psychiatric  Speech:  no latency; no press   Mood & Affect:  steady  congruent and appropriate   Thought Process:  illogical   Associations:  intact   Thought Content:  delusions: yes, paranoid   Insight:  has awareness of illness   Judgement: behavior is adequate to circumstances   Orientation:  grossly intact   Memory: intact for content of interview   Language: grossly intact   Attention Span & Concentration:  able to focus   Fund of Knowledge:  intact and appropriate to age and level of education     Assessment and Diagnosis   Status/Progress: Based on the examination today, the patient's problem(s) is/are adequately but not ideally controlled.  New problems have not been presented today.   Co-morbidities and Side effects  are not complicating management of the primary condition.  There are no active rule-out diagnoses for this patient at this time.     General Impression:    Continues with delusions of people following her at times, however with continued improved tolerability.  Some increase in frequency and severity of " delusional thoughts.  Denies noticeable side effects of medications.  Discussed considering increase risperidone.  Patient prefers not to increase at this time.  Patient agreeable to current treatment plan.  Patient denies suicidal ideations, homicidal ideation, thoughts of self-harm, and hallucinations.      ICD-10-CM ICD-9-CM   1. Paranoid schizophrenia  F20.0 295.30   2. Anxiety  F41.9 300.00   3. Encounter for long-term (current) use of other medications  Z79.899 V58.69         Intervention/Counseling/Treatment Plan   Medication Management: The risks and benefits of medication were discussed with the patient.  Counseling provided with patient and family as follows: importance of compliance with chosen treatment options was emphasized, risks and benefits of treatment options, including medications, were discussed with the patient, prognosis, patient and family education, instructions for  management, treatment and follow-up were reviewed   Discussed risks of tardive dyskinesia, drug induced parkinsonism, metabolic side effects, including weight gain, neuroleptic malignant syndrome. Patient verbalized understanding.   Discuss black box warning stating 1st generation and 2nd generation antipsychotics are not approved for dementia-related psychosis and have an increase mortality risk in elderly dementia patients often due to cardiovascular or infectious events. Patient verbalized understanding.   Discussed with patient informed consent, risks vs. benefits, alternative treatments, side effect profile and the inherent unpredictability of individual responses to these treatments. The patient expresses understanding of the above and displays the capacity to agree with this current plan and had no other questions.    Medications:   Continue Risperdal 0.25 mg by mouth nightly.      Return to Clinic: 3 months, as needed     Total time spent with patient and chart:  38 minutes    Patient instructed to please go to emergency  department if feeling as though you are going to harm to yourself or others or if you are in crisis; or to please call the clinic to report any worsening of symptoms or problems associated with medication.     A portion of this note was created using [employer] voice recognition software that occasionally misinterprets phrases or words.

## 2023-01-26 NOTE — TELEPHONE ENCOUNTER
Rec'd fax from [facility] requesting refill on risperidone 0.25 mg  Last refill: 6/16  Last visit: 1/13  Follow up: 4/14

## 2023-01-31 ENCOUNTER — OFFICE VISIT (OUTPATIENT)
Dept: PODIATRY | Facility: CLINIC | Age: 68
End: 2023-01-31
Payer: MEDICARE

## 2023-01-31 VITALS
BODY MASS INDEX: 36.32 KG/M2 | DIASTOLIC BLOOD PRESSURE: 76 MMHG | SYSTOLIC BLOOD PRESSURE: 143 MMHG | WEIGHT: 205 LBS | HEIGHT: 63 IN | HEART RATE: 55 BPM

## 2023-01-31 DIAGNOSIS — E11.9 ENCOUNTER FOR COMPREHENSIVE DIABETIC FOOT EXAMINATION, TYPE 2 DIABETES MELLITUS: Primary | ICD-10-CM

## 2023-01-31 DIAGNOSIS — E11.51 TYPE 2 DIABETES MELLITUS WITH DIABETIC PERIPHERAL ANGIOPATHY WITHOUT GANGRENE, WITHOUT LONG-TERM CURRENT USE OF INSULIN: ICD-10-CM

## 2023-01-31 DIAGNOSIS — B35.1 DERMATOPHYTOSIS OF NAIL: ICD-10-CM

## 2023-01-31 PROCEDURE — 99999 PR PBB SHADOW E&M-EST. PATIENT-LVL V: CPT | Mod: PBBFAC,,, | Performed by: PODIATRIST

## 2023-01-31 PROCEDURE — 99203 OFFICE O/P NEW LOW 30 MIN: CPT | Mod: S$PBB,,, | Performed by: PODIATRIST

## 2023-01-31 PROCEDURE — 99215 OFFICE O/P EST HI 40 MIN: CPT | Mod: PBBFAC,PO | Performed by: PODIATRIST

## 2023-01-31 PROCEDURE — 99999 PR PBB SHADOW E&M-EST. PATIENT-LVL V: ICD-10-PCS | Mod: PBBFAC,,, | Performed by: PODIATRIST

## 2023-01-31 PROCEDURE — 99203 PR OFFICE/OUTPT VISIT, NEW, LEVL III, 30-44 MIN: ICD-10-PCS | Mod: S$PBB,,, | Performed by: PODIATRIST

## 2023-01-31 NOTE — PROGRESS NOTES
Subjective:     Patient ID: Annie Mason is a 67 y.o. female.    Chief Complaint: Diabetic Foot Exam (C/o pain to right hallux, rates pain 3/10, wears tennis shoes with socks, Diabetic Pt, Last seen on 10/18/22 with Martha Alejandra NP)    Annie is a 67 y.o. female who presents to the clinic upon referral from Dr. Garcia  for evaluation and treatment of diabetic feet. Annie has a past medical history of Allergy, Anticoagulant long-term use, Brachial plexitis, Depression, Diabetes mellitus, type 2, GERD (gastroesophageal reflux disease), History of vitamin D deficiency, Hyperlipidemia (02/04/2015), Hypertension, Hypothyroidism, MG (myasthenia gravis), Mild intermittent asthma with acute exacerbation (12/26/2017), Myasthenia gravis without exacerbation (03/25/2015), Seizures, Sleep apnea, and Stroke. Patient relates no major problem with feet. Only complaints today consist of pain at the distal tip of right hallux. Patient rates pain 3/10. Patient states its feels like a hard piece of skin.     PCP: Sherice Gerardo MD    Date Last Seen by PCP: 10/18/2022    Current shoe gear: Tennis shoes    Hemoglobin A1C   Date Value Ref Range Status   11/02/2022 8.0 (H) 4.0 - 5.6 % Final     Comment:     ADA Screening Guidelines:  5.7-6.4%  Consistent with prediabetes  >or=6.5%  Consistent with diabetes    High levels of fetal hemoglobin interfere with the HbA1C  assay. Heterozygous hemoglobin variants (HbS, HgC, etc)do  not significantly interfere with this assay.   However, presence of multiple variants may affect accuracy.     05/30/2022 7.5 (H) 4.0 - 5.6 % Final     Comment:     ADA Screening Guidelines:  5.7-6.4%  Consistent with prediabetes  >or=6.5%  Consistent with diabetes    High levels of fetal hemoglobin interfere with the HbA1C  assay. Heterozygous hemoglobin variants (HbS, HgC, etc)do  not significantly interfere with this assay.   However, presence of multiple variants may affect accuracy.     02/16/2022 7.2 (H)  4.0 - 5.6 % Final     Comment:     ADA Screening Guidelines:  5.7-6.4%  Consistent with prediabetes  >or=6.5%  Consistent with diabetes    High levels of fetal hemoglobin interfere with the HbA1C  assay. Heterozygous hemoglobin variants (HbS, HgC, etc)do  not significantly interfere with this assay.   However, presence of multiple variants may affect accuracy.           Patient Active Problem List   Diagnosis    Family history of cardiovascular disease    B12 nutritional deficiency    Class 2 severe obesity due to excess calories with serious comorbidity and body mass index (BMI) of 38.0 to 38.9 in adult    Myasthenia gravis without exacerbation    Hypothyroidism    Essential hypertension    Hyperlipidemia    Diabetes mellitus, type 2    Type 2 diabetes mellitus with hyperglycemia, with long-term current use of insulin    History of vitamin D deficiency    Encounter for therapeutic drug level monitoring    Encounter for long-term current use of medication    Pruritus    Myasthenia gravis    Rash    Grieving    Port-A-Cath in place    SULEIMAN (obstructive sleep apnea)    Skin lesion    Encounter for completion of form with patient    Noncompliance with medication treatment due to underuse of medication    Peripheral edema    Dry skin    Chronic hip pain, bilateral    Hypercalcemia     Diastolic dysfunction    Osteopenia of neck of femur    Hyperparathyroidism    Brief psychotic disorder    Insomnia due to other mental disorder    Anxiety    Delusional disorder    History of transient ischemic attack (TIA)    Hallucinations, visual    Type 2 diabetes mellitus with diabetic peripheral angiopathy without gangrene, without long-term current use of insulin    Obstructive lung disease       Medication List with Changes/Refills   Current Medications    ALBUTEROL (PROVENTIL) 2.5 MG /3 ML (0.083 %) NEBULIZER SOLUTION    Take 2.5 mg by nebulization every 6 (six) hours as needed for Wheezing. Rescue    ALBUTEROL SULFATE (PROAIR  "RESPICLICK) 90 MCG/ACTUATION INHALER    Inhale 180 mcg into the lungs every 4 (four) hours. Rescue    ASPIRIN (ECOTRIN) 81 MG EC TABLET    Take 81 mg by mouth once daily.    ATORVASTATIN (LIPITOR) 80 MG TABLET    Take 1 tablet (80 mg total) by mouth once daily.    BD VEO INSULIN SYRINGE UF 0.3 ML 31 GAUGE X 15/64" SYRG    USE TO INJECT NOVOLOG 3 TIMES DAILY    BLOOD PRESSURE MONITOR KIT    Use to check blood pressure daily    BLOOD SUGAR DIAGNOSTIC (ONETOUCH VERIO TEST STRIPS) STRP    Check blood sugar 4 times daily.  DX E11.65.    BLOOD SUGAR DIAGNOSTIC STRP    To check BG 3 times daily, to use with insurance preferred meter. Dx code - E11.65    BLOOD-GLUCOSE METER KIT    Please check blood sugar four times daily and as needed. Re: Diabetes E11.65    BLOOD-GLUCOSE METER KIT    To check BG 3 times daily, to use with insurance preferred meter. Dx code - E11.65    CALCITRIOL (ROCALTROL) 0.25 MCG CAP    Take 1 capsule (0.25 mcg total) by mouth once daily.    COMPRESS.STOCKING,KNEE,REG,MED MISC    1 Pair by Misc.(Non-Drug; Combo Route) route once daily. 15-20 mmHg    CYANOCOBALAMIN (VITAMIN B-12) 100 MCG TABLET    Take 50 mcg by mouth once daily.    DICLOFENAC SODIUM (VOLTAREN) 1 % GEL    Apply 2 g topically 3 (three) times daily as needed (pain).    ERGOCALCIFEROL (ERGOCALCIFEROL) 50,000 UNIT CAP    Take one capsule weekly.    FLUOCINONIDE 0.05% (LIDEX) 0.05 % CREAM    Apply topically 2 (two) times daily.    FLUTICASONE (FLONASE) 50 MCG/ACTUATION NASAL SPRAY    2 sprays each  nostril bid x 1 week then q day prn  nasal congestion or sneezing    FLUTICASONE FUROATE-VILANTEROL (BREO) 200-25 MCG/DOSE DSDV DISKUS INHALER    INHALE 1 PUFF INTO THE LUNGS ONCE EVERY DAY    FUROSEMIDE (LASIX) 40 MG TABLET    TAKE 1 TABLET BY MOUTH EVERY DAY, USE ADDITIONAL 40MG AS NEEDED WEIGHT GAIN> 3 LBS INCREASED SHORTNESS OF BREATH    HYDROCODONE-ACETAMINOPHEN (NORCO) 5-325 MG PER TABLET    Take 1 tablet by mouth every 6 (six) hours as " "needed for Pain.    HYDROXYZINE HCL (ATARAX) 25 MG TABLET    Take 1 tablet (25 mg total) by mouth every 6 (six) hours as needed for Itching.    INSULIN (LANTUS SOLOSTAR U-100 INSULIN) GLARGINE 100 UNITS/ML SUBQ PEN    Take 40 units subQ daily.    INSULIN LISPRO (HUMALOG KWIKPEN INSULIN) 100 UNIT/ML PEN    INJECT 11 UNITS UNDER THE SKIN WITH BREAKFAST AND DINNER, 12 UNITS WITH LUNCH, MAXIMUM DAILY DOSE IS 60 UNITS    LANCETS (ONETOUCH DELICA LANCETS) 33 GAUGE MISC    Use four times daily.    LANCETS MISC    To check BG 3 times daily, to use with insurance preferred meter. Dx code - E11.65    LEVOTHYROXINE (SYNTHROID) 50 MCG TABLET    Take 1 tablet (50 mcg total) by mouth once daily.    LORATADINE (CLARITIN) 10 MG TABLET    Take 1 tablet (10 mg total) by mouth once daily.    MULTIVITAMIN (THERAGRAN) PER TABLET    Take 1 tablet by mouth once daily.    NYSTATIN (MYCOSTATIN) CREAM    Apply topically 2 (two) times daily.    OMEPRAZOLE (PRILOSEC) 40 MG CAPSULE    Take 40 mg by mouth once daily.    ONDANSETRON (ZOFRAN-ODT) 4 MG TBDL    Take 4 mg by mouth every 6 (six) hours as needed.    ONGLYZA 5 MG TAB TABLET    TAKE 1 TABLET(5 MG) BY MOUTH EVERY DAY    PEN NEEDLE, DIABETIC (BD ULTRA-FINE SHORT PEN NEEDLE) 31 GAUGE X 5/16" NDLE    USE 4X DAILY WITH INSULIN    PYRIDOSTIGMINE (MESTINON) 60 MG TAB    Take 1 tablet (60 mg total) by mouth every 6 (six) hours. Take  3 to 4 times a day for symptoms of MG.    RISPERIDONE (RISPERDAL) 0.25 MG TAB    Take 1 tablet (0.25 mg total) by mouth every evening.    SPIRONOLACTONE (ALDACTONE) 25 MG TABLET    Take 1 tablet (25 mg total) by mouth once daily.    TRIAMCINOLONE ACETONIDE 0.5% (KENALOG) 0.5 % CREA    Apply topically 2 (two) times daily.    VALACYCLOVIR (VALTREX) 1000 MG TABLET    Take 1 tablet (1,000 mg total) by mouth 3 (three) times daily.    XIIDRA 5 % DPET    Place 1 drop into both eyes 2 (two) times daily.       Review of patient's allergies indicates:   Allergen Reactions    " "Ramipril      Other reaction(s): caused a severe cough       Past Surgical History:   Procedure Laterality Date    CATARACT EXTRACTION Left 09/2021    CATHETERIZATION OF BOTH LEFT AND RIGHT HEART N/A 7/22/2022    Procedure: CATHETERIZATION, HEART, BOTH LEFT AND RIGHT;  Surgeon: Ike Jasso III, MD;  [facility];  Service: Cardiology;  Laterality: N/A;    CHOLECYSTECTOMY      CORONARY ANGIOGRAPHY N/A 01/28/2019    Procedure: ANGIOGRAM, CORONARY ARTERY;  Surgeon: Ike Jasso III, MD;  Location: [facility];  Service: Cardiology;  Laterality: N/A;    LEFT HEART CATHETERIZATION Left 01/28/2019    Procedure: Left heart cath;  Surgeon: Ike Jasso III, MD;  Location: [facility];  Service: Cardiology;  Laterality: Left;    PORTACATH PLACEMENT  12/14/2016    thalmusectomy      TUBAL LIGATION         Family History   Problem Relation Age of Onset    Kidney disease Mother     Diabetes Mother     Hypertension Mother     Hypertension Father     Stroke Father     Breast cancer Sister 64    Diabetes type I Son     Ovarian cancer Maternal Aunt     Breast cancer Maternal Aunt         age unknown    Myasthenia gravis Paternal Aunt        Social History     Socioeconomic History    Marital status: Single    Number of children: 12   Occupational History    Occupation: disabled   Tobacco Use    Smoking status: Never    Smokeless tobacco: Never   Substance and Sexual Activity    Alcohol use: No    Drug use: No   Social History Narrative    Had 12 living children, did lose a set of triplets that were very premature; 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       Vitals:    01/31/23 1458   BP: (!) 143/76   Pulse: (!) 55   Weight: 93 kg (205 lb)   Height: 5' 3" (1.6 m)   PainSc:   3   PainLoc: Toe       Hemoglobin A1C   Date Value Ref Range Status   11/02/2022 8.0 (H) 4.0 - 5.6 % Final     Comment:     ADA Screening Guidelines:  5.7-6.4%  Consistent with prediabetes  >or=6.5%  Consistent with diabetes    High " levels of fetal hemoglobin interfere with the HbA1C  assay. Heterozygous hemoglobin variants (HbS, HgC, etc)do  not significantly interfere with this assay.   However, presence of multiple variants may affect accuracy.     05/30/2022 7.5 (H) 4.0 - 5.6 % Final     Comment:     ADA Screening Guidelines:  5.7-6.4%  Consistent with prediabetes  >or=6.5%  Consistent with diabetes    High levels of fetal hemoglobin interfere with the HbA1C  assay. Heterozygous hemoglobin variants (HbS, HgC, etc)do  not significantly interfere with this assay.   However, presence of multiple variants may affect accuracy.     02/16/2022 7.2 (H) 4.0 - 5.6 % Final     Comment:     ADA Screening Guidelines:  5.7-6.4%  Consistent with prediabetes  >or=6.5%  Consistent with diabetes    High levels of fetal hemoglobin interfere with the HbA1C  assay. Heterozygous hemoglobin variants (HbS, HgC, etc)do  not significantly interfere with this assay.   However, presence of multiple variants may affect accuracy.         Review of Systems   Constitutional:  Negative for chills and fever.   Respiratory:  Negative for shortness of breath.    Cardiovascular:  Negative for chest pain, palpitations, orthopnea, claudication and leg swelling.   Gastrointestinal:  Negative for diarrhea, nausea and vomiting.   Musculoskeletal:  Negative for joint pain.   Skin:  Negative for rash.   Neurological:  Positive for tingling.   Psychiatric/Behavioral: Negative.             Objective:      PHYSICAL EXAM: Apperance: Alert and orient in no distress,well developed, and with good attention to grooming and body habits  Patient presents ambulating in tennis shoes.   LOWER EXTREMITY EXAM:  VASCULAR: Dorsalis pedis pulses 2/4 bilateral and Posterior Tibial pulses 1/4 bilateral. Capillary fill time <blanched bilateral. No edema observed bilateral. Varicosities absent bilateral. Skin temperature of the lower extremities is warm to warm, proximal to distal. Hair growth dim  bilateral.  DERMATOLOGICAL: No skin rashes, subcutaneous nodules, lesions, or ulcers observed bilateral. Nails 1,2,3,4,5 left and 2,3,4,5 right normal length and color. Right hallux nail absent. Webspaces 1,2,3,4 bilateral clean, dry and without evidence of break in skin integrity.   NEUROLOGICAL: Light touch, sharp-dull, proprioception all present and equal bilaterally.  Vibratory sensation intact at bilateral hallux. Protective sensation intact at all 10 sites as tested with a [place]-Margo 5.07 monofilament.   MUSCULOSKELETAL: Muscle strength is 5/5 for foot inverters, everters, plantarflexors, and dorsiflexors. Muscle tone is normal. Tenderness on palpation of distal medial right hallux.          Assessment:       ICD-10-CM ICD-9-CM   1. Encounter for comprehensive diabetic foot examination, type 2 diabetes mellitus  E11.9 250.00   2. Type 2 diabetes mellitus with diabetic peripheral angiopathy without gangrene, without long-term current use of insulin  E11.51 250.70     443.81   3. Dermatophytosis of nail  B35.1 110.1       Plan:   Encounter for comprehensive diabetic foot examination, type 2 diabetes mellitus    Type 2 diabetes mellitus with diabetic peripheral angiopathy without gangrene, without long-term current use of insulin  -     Ambulatory referral/consult to Podiatry    Dermatophytosis of nail    I counseled the patient on her conditions, regarding findings of my examination, my impressions, and usual treatment plan.   This visit spent on counseling and coordination of care. Appointment spent on education about the diabetic foot, neuropathy, and prevention of limb loss.  Shoe inspection. Diabetic Foot Education. Patient reminded of the importance of good nutrition and blood sugar control to help prevent podiatric complications of diabetes. Patient instructed on proper foot hygeine. We discussed wearing proper shoe gear, daily foot inspections, never walking without protective shoe gear, never  putting sharp instruments to feet.    Patient  will continue to monitor the areas daily, inspect feet, wear protective shoe gear when ambulatory, moisturizer to maintain skin integrity. Patient reminded of the importance of good nutrition and blood sugar control to help prevent podiatric complications of diabetes.  Patient to return 12 months or sooner if needed.        Gail Zuniga DPM  [facility]

## 2023-02-14 ENCOUNTER — DOCUMENTATION ONLY (OUTPATIENT)
Dept: PSYCHIATRY | Facility: CLINIC | Age: 68
End: 2023-02-14
Payer: MEDICARE

## 2023-02-14 NOTE — PROGRESS NOTES
SW called ct to see why ct missed session. JERRY lvm on home phone. SW will follow up with ct.    Retention Suture Text: Retention sutures were placed to support the closure and prevent dehiscence.

## 2023-02-23 DIAGNOSIS — E83.51 HYPOCALCEMIA: ICD-10-CM

## 2023-02-23 NOTE — TELEPHONE ENCOUNTER
Can you send the Rx. for calcitRIOL (ROCALTROL) 0.25 MCG Cap to [facility] & they will submit the prior authorization. Once approved the pharmacy will mail the medication to the patient & if denied they will process the appeal.

## 2023-02-24 DIAGNOSIS — E11.51 TYPE 2 DIABETES MELLITUS WITH DIABETIC PERIPHERAL ANGIOPATHY WITHOUT GANGRENE, WITHOUT LONG-TERM CURRENT USE OF INSULIN: Primary | ICD-10-CM

## 2023-02-24 RX ORDER — CALCITRIOL 0.25 UG/1
0.25 CAPSULE ORAL DAILY
Qty: 90 CAPSULE | Refills: 3 | Status: SHIPPED | OUTPATIENT
Start: 2023-02-24 | End: 2023-04-05

## 2023-03-02 ENCOUNTER — OFFICE VISIT (OUTPATIENT)
Dept: ENDOCRINOLOGY | Facility: CLINIC | Age: 68
End: 2023-03-02
Payer: MEDICARE

## 2023-03-02 VITALS
OXYGEN SATURATION: 97 % | HEIGHT: 63 IN | HEART RATE: 80 BPM | DIASTOLIC BLOOD PRESSURE: 80 MMHG | SYSTOLIC BLOOD PRESSURE: 138 MMHG | TEMPERATURE: 98 F | BODY MASS INDEX: 36.14 KG/M2 | WEIGHT: 203.94 LBS

## 2023-03-02 DIAGNOSIS — E21.3 HYPERPARATHYROIDISM: ICD-10-CM

## 2023-03-02 DIAGNOSIS — E04.1 NODULAR THYROID DISEASE: ICD-10-CM

## 2023-03-02 DIAGNOSIS — E53.8 VITAMIN B12 DEFICIENCY: ICD-10-CM

## 2023-03-02 DIAGNOSIS — E11.65 TYPE 2 DIABETES MELLITUS WITH HYPERGLYCEMIA, WITH LONG-TERM CURRENT USE OF INSULIN: Primary | ICD-10-CM

## 2023-03-02 DIAGNOSIS — E11.51 TYPE 2 DIABETES MELLITUS WITH DIABETIC PERIPHERAL ANGIOPATHY WITHOUT GANGRENE, WITHOUT LONG-TERM CURRENT USE OF INSULIN: ICD-10-CM

## 2023-03-02 DIAGNOSIS — E03.4 HYPOTHYROIDISM DUE TO ACQUIRED ATROPHY OF THYROID: ICD-10-CM

## 2023-03-02 DIAGNOSIS — E66.01 CLASS 2 SEVERE OBESITY DUE TO EXCESS CALORIES WITH SERIOUS COMORBIDITY AND BODY MASS INDEX (BMI) OF 35.0 TO 35.9 IN ADULT: ICD-10-CM

## 2023-03-02 DIAGNOSIS — E55.9 HYPOVITAMINOSIS D: ICD-10-CM

## 2023-03-02 DIAGNOSIS — Z79.4 TYPE 2 DIABETES MELLITUS WITH HYPERGLYCEMIA, WITH LONG-TERM CURRENT USE OF INSULIN: Primary | ICD-10-CM

## 2023-03-02 PROCEDURE — 99999 PR PBB SHADOW E&M-EST. PATIENT-LVL V: ICD-10-PCS | Mod: PBBFAC,,, | Performed by: PHYSICIAN ASSISTANT

## 2023-03-02 PROCEDURE — 99213 OFFICE O/P EST LOW 20 MIN: CPT | Mod: S$PBB,,, | Performed by: PHYSICIAN ASSISTANT

## 2023-03-02 PROCEDURE — 99999 PR PBB SHADOW E&M-EST. PATIENT-LVL V: CPT | Mod: PBBFAC,,, | Performed by: PHYSICIAN ASSISTANT

## 2023-03-02 PROCEDURE — 99213 PR OFFICE/OUTPT VISIT, EST, LEVL III, 20-29 MIN: ICD-10-PCS | Mod: S$PBB,,, | Performed by: PHYSICIAN ASSISTANT

## 2023-03-02 PROCEDURE — 99215 OFFICE O/P EST HI 40 MIN: CPT | Mod: PBBFAC,PO | Performed by: PHYSICIAN ASSISTANT

## 2023-03-02 NOTE — PROGRESS NOTES
"   Patient ID:  Annie Mason is a 67 y.o. female.    Chief Complaint:      Patient ID:  Annie Mason is a 66 y.o. female.     Chief Complaint:  Diabetes      Pt presents to follow up for type 2 diabetes, HPT, osteopenia, and review of chronic medical conditions as listed in the visit diagnosis section of this encounter.      PMH also significant for myasthenia gravis. Previously seen by Dr. Sandifer in .     Onset of Type 2 diabetes mellitus was < 10 years ago.   Hx of CVA but no other macrovascular complications. No microvascular complications. No history of DKA.   Cardiovascular risk factors: advanced age (older than 55 for men, 65 for women), diabetes mellitus, dyslipidemia, hypertension and sedentary lifestyle. No hx of DR per pt. No history of pancreatitis or MEN syndrome.      Currently taking:  Humalog 7 units with meals   Lantus 40 units at night  Onglyza 5 mg once daily. No hx of pancreatitis or MEN syndrome.       Home blood sugar records:   Fastins  LH: 100s  DN: 160s    Occ low blood sugar at night-She will eat bread.    Did not tolerate SGLT-2i, GLP-1, or metformin.    Previous meds:  Metformin-Read an article that metformin can cause spots on her legs and stopped taking. She had a tender spot on her right leg.  Farxiga-dizziness, polyuria  Bydrueon-severe nausea     Eye exam in  and no DPR per pt. Has +ve dry eye after cataract surgery      Eats 3 meals daily. Snacks in between meals.      Walks for exercise qod.   Any episodes of hypoglycemia? none     Still having hallucinations. Feels like cars are following her and driving towards her with their lights on. She "knows these things are real." Is not interested in taking more psych meds.     Nodular Thyroid Disease  Thyroid u/s  showed a subcm nodule in the right thyroid. No sob, dysphagia or voice changes.    Diabetes Management Status    Statin: Taking  ACE/ARB: Not taking    Screening or Prevention Patient's value Goal " "Complete/Controlled?   HgA1C Testing and Control   Lab Results   Component Value Date    HGBA1C 7.9 (H) 02/24/2023      Annually/Less than 8% Yes   Lipid profile : 11/02/2022 Annually Yes   LDL control Lab Results   Component Value Date    LDLCALC 60.6 (L) 11/02/2022    Annually/Less than 100 mg/dl  Yes   Nephropathy screening Lab Results   Component Value Date    LABMICR 10.0 11/02/2022     Lab Results   Component Value Date    PROTEINUA Negative 03/08/2021    Annually Yes   Blood pressure BP Readings from Last 1 Encounters:   02/24/23 120/80    Less than 140/90 Yes   Dilated retinal exam : 02/23/2023 Annually Yes   Foot exam   : 01/31/2023 Annually Yes       Review of Systems   Gastrointestinal:  Negative for abdominal pain.   Musculoskeletal:  Negative for arthralgias.   Neurological:  Positive for weakness.      Past Medical History:   Diagnosis Date    Allergy     Anticoagulant long-term use     Brachial plexitis     Depression     Diabetes mellitus, type 2     GERD (gastroesophageal reflux disease)     History of vitamin D deficiency     Hyperlipidemia 02/04/2015    Hypertension     Hypothyroidism     MG (myasthenia gravis)     Mild intermittent asthma with acute exacerbation 12/26/2017    Myasthenia gravis without exacerbation 03/25/2015    Seizures     "years ago"    Sleep apnea     not compliant with machine    Stroke     2008      Social History     Tobacco Use    Smoking status: Never    Smokeless tobacco: Never   Substance Use Topics    Alcohol use: No    Drug use: No     Family History   Problem Relation Age of Onset    Kidney disease Mother     Diabetes Mother     Hypertension Mother     Hypertension Father     Stroke Father     Breast cancer Sister 64    Diabetes type I Son     Ovarian cancer Maternal Aunt     Breast cancer Maternal Aunt         age unknown    Myasthenia gravis Paternal Aunt       Past Surgical History:   Procedure Laterality Date    CATARACT EXTRACTION Left 09/2021    CATHETERIZATION " OF BOTH LEFT AND RIGHT HEART N/A 7/22/2022    Procedure: CATHETERIZATION, HEART, BOTH LEFT AND RIGHT;  Surgeon: Ike Jasso III, MD;  Location: [facility] CATH;  Service: Cardiology;  Laterality: N/A;    CHOLECYSTECTOMY      CORONARY ANGIOGRAPHY N/A 01/28/2019    Procedure: ANGIOGRAM, CORONARY ARTERY;  Surgeon: Ike Jasso III, MD;  Location: [facility] CATH;  Service: Cardiology;  Laterality: N/A;    LEFT HEART CATHETERIZATION Left 01/28/2019    Procedure: Left heart cath;  Surgeon: Ike Jasso III, MD;  Location: [facility] CATH;  Service: Cardiology;  Laterality: Left;    PORTACATH PLACEMENT  12/14/2016    thalmusectomy      TUBAL LIGATION        Review of patient's allergies indicates:   Allergen Reactions    Ramipril      Other reaction(s): caused a severe cough        Objective:   There were no vitals taken for this visit.  BP Readings from Last 3 Encounters:   02/24/23 120/80   01/31/23 (!) 143/76   10/28/22 118/70     Wt Readings from Last 3 Encounters:   02/24/23 0807 92.1 kg (203 lb)   01/31/23 1458 93 kg (205 lb)   10/28/22 1415 93.3 kg (205 lb 11 oz)        Physical Exam  Vitals reviewed.   Constitutional:       General: She is not in acute distress.     Appearance: Normal appearance. She is well-developed. She is not ill-appearing, toxic-appearing or diaphoretic.   HENT:      Head: Normocephalic and atraumatic.   Eyes:      General: No scleral icterus.  Neck:      Trachea: No tracheal deviation.   Pulmonary:      Effort: Pulmonary effort is normal. No respiratory distress.   Neurological:      Mental Status: She is alert and oriented to person, place, and time.   Psychiatric:         Thought Content: Thought content normal.         Lab Results   Component Value Date     11/02/2022    K 3.5 11/02/2022     11/02/2022    CO2 25 11/02/2022    BUN 12 11/02/2022    CREATININE 0.8 11/02/2022    GLU 84 11/02/2022    HGBA1C 7.9 (H) 02/24/2023    MG 1.8 01/13/2022    AST 33 11/02/2022    AST 44 (H) 03/05/2016     ALT 24 11/02/2022    ALBUMIN 3.7 11/02/2022    PROT 6.9 11/02/2022    BILITOT 0.7 11/02/2022    WBC 6.48 07/22/2022    HGB 12.7 07/22/2022    HCT 38.7 07/22/2022    MCV 93 07/22/2022    MCH 30.5 07/22/2022     07/22/2022    MPV 10.1 07/22/2022    GRAN 4.2 06/29/2022    GRAN 62.1 06/29/2022    LYMPH 2.0 06/29/2022    LYMPH 28.6 06/29/2022    CHOL 114 (L) 11/02/2022    HDL 45 11/02/2022    LDLCALC 60.6 (L) 11/02/2022    TRIG 42 11/02/2022       Lab Results   Component Value Date    TSH 0.487 11/02/2022    FREET4 0.73 11/02/2022        Thyroid Labs Latest Ref Rng & Units 6/29/2022 7/22/2022 11/2/2022   TSH 0.400 - 4.000 uIU/mL - - 0.487   Free T4 0.71 - 1.51 ng/dL - - 0.73   Sodium 136 - 145 mmol/L 141 139 139   Potassium 3.5 - 5.1 mmol/L 3.8 3.8 3.5   Chloride 95 - 110 mmol/L 109 106 103   Carbon Dioxide 23 - 29 mmol/L 29 27 25   Glucose 70 - 110 mg/dL 88 169(H) 84   Blood Urea Nitrogen 8 - 23 mg/dL 14 18 12   Creatinine 0.5 - 1.4 mg/dL 0.63 0.78 0.8   Calcium 8.7 - 10.5 mg/dL 9.5 9.6 9.8   Total Protein 6.0 - 8.4 g/dL 6.7 7.4 6.9   Albumin 3.5 - 5.2 g/dL 3.8 4.2 3.7   Total Bilirubin 0.1 - 1.0 mg/dL 0.5 0.4 0.7   AST 10 - 40 U/L 79(H) 37(H) 33   ALT 10 - 44 U/L 36(H) 26 24   Anion Gap 8 - 16 mmol/L 3(L) 6(L) 11   eGFR (African American) >60 mL/min/1.73 m:2 >60 >60 -   eGFR (Non-African American) >60 mL/min/1.73 m:2 >60 >60 -   WBC 3.90 - 12.70 K/uL 6.81 6.48 -   RBC 4.00 - 5.40 M/uL 4.01 4.17 -   Hemoglobin 12.0 - 16.0 g/dL 12.3 12.7 -   Hematocrit 37.0 - 48.5 % 38.5 38.7 -   MCV 82 - 98 fL 96 93 -   MCH 27.0 - 31.0 pg 30.7 30.5 -   MCHC 32.0 - 36.0 g/dL 31.9(L) 32.8 -   RDW 11.5 - 14.5 % 13.9 13.5 -   Platelets 150 - 450 K/uL 223 229 -   MPV 9.2 - 12.9 fL 10.4 10.1 -   Gran # 1.8 - 7.7 K/uL 4.2 - -   Lymph # 1.0 - 4.8 K/uL 2.0 - -   Mono # 0.3 - 1.0 K/uL 0.4 - -   Eos # 0.0 - 0.5 K/uL 0.2 - -   Baso # 0.00 - 0.20 K/uL 0.03 - -   Gran % 38.0 - 73.0 % 62.1 - -   Lymph % 18.0 - 48.0 % 28.6 - -   Mono% 4.0 -  15.0 % 6.2 - -   Eos % 0.0 - 8.0 % 2.6 - -   Baso % 0.0 - 1.9 % 0.4 - -   INR - - - -           Hemoglobin A1C   Date Value Ref Range Status   02/24/2023 7.9 (H) 0.0 - 5.6 % Final     Comment:     Reference Interval:  5.0 - 5.6 Normal   5.7 - 6.4 High Risk   > 6.5 Diabetic      Hgb A1c results are standardized based on the (NGSP) National   Glycohemoglobin Standardization Program.      Hemoglobin A1C levels are related to mean serum/plasma glucose   during the preceding 2-3 months.        11/02/2022 8.0 (H) 4.0 - 5.6 % Final     Comment:     ADA Screening Guidelines:  5.7-6.4%  Consistent with prediabetes  >or=6.5%  Consistent with diabetes    High levels of fetal hemoglobin interfere with the HbA1C  assay. Heterozygous hemoglobin variants (HbS, HgC, etc)do  not significantly interfere with this assay.   However, presence of multiple variants may affect accuracy.     05/30/2022 7.5 (H) 4.0 - 5.6 % Final     Comment:     ADA Screening Guidelines:  5.7-6.4%  Consistent with prediabetes  >or=6.5%  Consistent with diabetes    High levels of fetal hemoglobin interfere with the HbA1C  assay. Heterozygous hemoglobin variants (HbS, HgC, etc)do  not significantly interfere with this assay.   However, presence of multiple variants may affect accuracy.       EXAMINATION:  US SOFT TISSUE HEAD NECK THYROID     CLINICAL HISTORY:  H/o HPT. Look for parathyroid adenoma;.  Hyperparathyroidism, unspecified     TECHNIQUE:  Ultrasound of the thyroid and cervical lymph nodes was performed.     COMPARISON:  None.     FINDINGS:  The thyroid is normal in size. Right lobe of the thyroid measures 3.2 x 1.7 x 1.4 cm with an estimated volume of 4.1 mL.  Left lobe of the thyroid measures 4.1 x 1.0 x 1.6 cm with an estimated volume of 3.4 mL.  Isthmus measures 0.2 cm in thickness.  Total thyroid volume measures 7.5 mL.     Single complex solid and cystic nodule in the upper pole of the right thyroid lobe, measuring 0.6 x 0.5 x 0.7 cm, which is  not meet criteria fine-needle aspiration.  No other thyroid or parathyroid nodules demonstrated.     Normal thyroid perfusion.     No pathologically enlarged cervical lymph nodes.     Impression:     1. Single subcentimeter right thyroid lobe nodule, which does not meet criteria for fine-needle aspiration.  2. No parathyroid nodules demonstrated.        Electronically signed by: Kvng Negron  Date:                                            06/30/2022  Time:                                           13:43    EXAMINATION:  DEXA BONE DENSITY SPINE HIP     CLINICAL HISTORY:  Asymptomatic menopausal state     TECHNIQUE:  DXA scanning was performed over the left hip and lumbar spine.  Review of the images confirms satisfactory positioning and technique.     COMPARISON:  Outside study of 03/12/2020     FINDINGS:  The L1 to L4 vertebral bone mineral density is equal to 1.108 g/cm squared with a T score of -0.4..  Previously 1.184 g per sq cm with T-score -0.1     The left femoral neck bone mineral density is equal to 0.806 g/cm squared with a T score of -0.4.  Previously 0.804 g per sq cm T-score -1.1.     The total hip bone mineral density is equal to 1.039 g/cm squared with a T score of 0.8.     Previously 0.959 g per sq cm with T-score 0.0     There is a 11% risk of a major osteoporotic fracture and a 0.7% risk of hip fracture in the next 10 years (FRAX).     Impression:     Normal bone mineral density     Consider FDA approved medical therapies in postmenopausal women and men aged 50 years and older, based on the following:     *A hip or vertebral (clinical or morphometric) fracture  *T score less than or equal to -2.5 at the femoral neck or spine after appropriate evaluation to exclude secondary causes.  *Low bone mass -- also known as osteopenia (T score between -1.0 and -2.5 at the femoral neck or spine) and a 10 year probability of hip fracture greater than or equal to 3% or a 10 year probability of major  osteoporosis-related fracture greater than or equal to 20% based on the [place]-adapted WHO algorithm.  *Clinicians judgment and/or patient preference may indicate treatment for people with 10 year fracture probabilities is above or below these levels.        Electronically signed by: Aishwarya Dorsey MD  Date:                                            11/22/2022  Time:                                           13:16  Assessment and plan:      1. Type 2 diabetes mellitus with diabetic peripheral angiopathy without gangrene, without long-term current use of insulin  Hemoglobin A1C    Renal Function Panel      2. Hypovitaminosis D  Vitamin D      3. Vitamin B12 deficiency  VITAMIN B12      4. Nodular thyroid disease  US Soft Tissue Head Neck Thyroid      5. Type 2 diabetes mellitus with hyperglycemia, with long-term current use of insulin  Ambulatory referral/consult to Diabetes Education         T2DM     Pt with macrovascular complication of CVA.  Pt taking antipsychotics which can exacerbate BG. Increase Humalog to 10 units with meals. Decrease Lantus to 35 units nightly. Continue Onglyza. Declines CGM.   HbA1C today. Goal is <7% if this can be obtained without hypoglycemia.  Pt with labile BG. Has some hyperglycemia. Continue current regimen.       Reviewed how to correct hypoglycemia.     Did not tolerate SGLT-2i or metformin.  Check BG before meals and bedtime. Reviewed benefits of CGM but pt still not interested.    Urine micro wnls.  Counseled pt on low carb/low fat diet and to increase physical activity.  On statin.              Hypothyroidism     Overall is clinically euthyroid. TFTs wnl.. Continue current dose of thyroid hormone. F/u with PCP.            Hyperparathyroidism     Suspect  primary hyperparathyroidism  vit D decreased but ca is normal  pth trended up to 180's  Prior 24 urine calcium 2020- 24 hr ca/cr ratio 0.0099 (U ca 5.9, U Cr 40, S ca 9.5, S Cr 0.59). Check gene test for [facility]  Dexa is wnl.  Forearm  BMD wnls. Repeat due now  Prior renal US 2020 with no stones  Had new onset psychotic episodes ? Related-Though calcium has been wnls. Has some constipation and other none specific sx's.      Discussed indications for surgery if primary hyperparathyroidism proven     Avoid dehydration, excessive calcium supplementation and meds (HCTZ)  that can worsen hypercalcemia.     Follow up after results reviewed. Pt is now willing to consider referral to ENT            Relevant Orders   US Soft Tissue Head Neck Thyroid (Completed)   Class 2 severe obesity due to excess calories with serious comorbidity and body mass index (BMI) of 38.0 to 38.9 in adult     BMI 38. Encouraged healthy low fat low carb diet and increase physical activity.                    Referral to DE  F/u in 4 mths w/ labs prior and thyroid u/s

## 2023-03-15 ENCOUNTER — OFFICE VISIT (OUTPATIENT)
Dept: PSYCHIATRY | Facility: CLINIC | Age: 68
End: 2023-03-15
Payer: MEDICARE

## 2023-03-15 DIAGNOSIS — F20.0 PARANOID SCHIZOPHRENIA: Primary | ICD-10-CM

## 2023-03-15 PROCEDURE — 99499 UNLISTED E&M SERVICE: CPT | Mod: 95,,, | Performed by: SOCIAL WORKER

## 2023-03-15 PROCEDURE — 99499 NO LOS: ICD-10-PCS | Mod: 95,,, | Performed by: SOCIAL WORKER

## 2023-03-15 NOTE — PROGRESS NOTES
Established Patient - Audio Only Telehealth Visit     The patient location is: [place]  The chief complaint leading to consultation is: Follow up   Visit type: Virtual visit with audio only (telephone)  Total time spent with patient: 6 minutes       The reason for the audio only service rather than synchronous audio and video virtual visit was related to technical difficulties or patient preference/necessity.     Each patient to whom I provide medical services by telemedicine is:  (1) informed of the relationship between the physician and patient and the respective role of any other health care provider with respect to management of the patient; and (2) notified that they may decline to receive medical services by telemedicine and may withdraw from such care at any time. Patient verbally consented to receive this service via voice-only telephone call.       HPI: Ct was referred to tx to address psychosis. At 3:15, JERRY called ct to see if she was having difficulties logging in. Ct shared that she did not have the [employer] yenny on the phone. She initially agreed to audio but then wanted to reschedule. SW checked in with ct before hanging up. Ct denied depression and anxiety. She reported that things were fine at work and home. She reported that she still sees the cars but she prays and that helps her to not focus on the cars. The cars are a fixed delusion that ct has. Jerry confirmed ct's next appt on 4-14-23 for 2pm with this sW and 130p the same day with Rodolfo CARMICHAEL NP.      Assessment and plan:  Ct to follow up with scheduled appts and med compliance.                         This service was not originating from a related E/M service provided within the previous 7 days nor will  to an E/M service or procedure within the next 24 hours or my soonest available appointment.  Prevailing standard of care was able to be met in this audio-only visit.

## 2023-03-16 PROBLEM — F23 BRIEF PSYCHOTIC DISORDER: Status: RESOLVED | Noted: 2021-04-05 | Resolved: 2023-03-16

## 2023-03-16 PROBLEM — Z02.89 ENCOUNTER FOR COMPLETION OF FORM WITH PATIENT: Status: RESOLVED | Noted: 2017-05-19 | Resolved: 2023-03-16

## 2023-03-16 PROBLEM — R60.0 PERIPHERAL EDEMA: Status: RESOLVED | Noted: 2018-07-16 | Resolved: 2023-03-16

## 2023-03-16 PROBLEM — L98.9 SKIN LESION: Status: RESOLVED | Noted: 2017-05-19 | Resolved: 2023-03-16

## 2023-03-21 PROBLEM — F22 DELUSIONAL DISORDER: Chronic | Status: ACTIVE | Noted: 2021-12-23

## 2023-03-21 PROBLEM — Z86.73 HISTORY OF TRANSIENT ISCHEMIC ATTACK (TIA): Chronic | Status: ACTIVE | Noted: 2021-12-23

## 2023-03-21 PROBLEM — J44.9 OBSTRUCTIVE LUNG DISEASE: Chronic | Status: ACTIVE | Noted: 2022-10-18

## 2023-03-21 PROBLEM — G47.33 OSA (OBSTRUCTIVE SLEEP APNEA): Chronic | Status: ACTIVE | Noted: 2017-04-07

## 2023-03-21 PROBLEM — Z95.828 PORT-A-CATH IN PLACE: Chronic | Status: ACTIVE | Noted: 2017-03-15

## 2023-03-21 PROBLEM — I51.89 DIASTOLIC DYSFUNCTION: Chronic | Status: ACTIVE | Noted: 2020-03-06

## 2023-03-21 PROBLEM — F51.05 INSOMNIA DUE TO OTHER MENTAL DISORDER: Chronic | Status: ACTIVE | Noted: 2021-04-05

## 2023-03-21 PROBLEM — E11.51 TYPE 2 DIABETES MELLITUS WITH DIABETIC PERIPHERAL ANGIOPATHY WITHOUT GANGRENE, WITHOUT LONG-TERM CURRENT USE OF INSULIN: Chronic | Status: ACTIVE | Noted: 2022-07-01

## 2023-03-21 PROBLEM — F41.9 ANXIETY: Chronic | Status: ACTIVE | Noted: 2021-05-03

## 2023-03-21 PROBLEM — F99 INSOMNIA DUE TO OTHER MENTAL DISORDER: Chronic | Status: ACTIVE | Noted: 2021-04-05

## 2023-07-08 DIAGNOSIS — E03.9 ACQUIRED HYPOTHYROIDISM: ICD-10-CM

## 2023-07-10 RX ORDER — LEVOTHYROXINE SODIUM 50 UG/1
TABLET ORAL
Qty: 90 TABLET | Refills: 3 | Status: SHIPPED | OUTPATIENT
Start: 2023-07-10

## 2023-08-17 ENCOUNTER — HOSPITAL ENCOUNTER (OUTPATIENT)
Dept: RADIOLOGY | Facility: HOSPITAL | Age: 68
Discharge: HOME | End: 2023-08-17
Attending: PHYSICIAN ASSISTANT
Payer: MEDICARE

## 2023-08-17 DIAGNOSIS — E04.1 NODULAR THYROID DISEASE: ICD-10-CM

## 2023-08-17 PROCEDURE — 76536 US SOFT TISSUE HEAD NECK THYROID: ICD-10-PCS | Mod: 26,,, | Performed by: RADIOLOGY

## 2023-08-17 PROCEDURE — 76536 US EXAM OF HEAD AND NECK: CPT | Mod: 26,,, | Performed by: RADIOLOGY

## 2023-08-17 PROCEDURE — 76536 US EXAM OF HEAD AND NECK: CPT | Mod: TC,PO

## 2023-08-25 ENCOUNTER — OFFICE VISIT (OUTPATIENT)
Dept: ENDOCRINOLOGY | Facility: CLINIC | Age: 68
End: 2023-08-25
Payer: MEDICARE

## 2023-08-25 VITALS
BODY MASS INDEX: 36.11 KG/M2 | HEIGHT: 63 IN | DIASTOLIC BLOOD PRESSURE: 70 MMHG | OXYGEN SATURATION: 96 % | WEIGHT: 203.81 LBS | HEART RATE: 70 BPM | TEMPERATURE: 99 F | SYSTOLIC BLOOD PRESSURE: 120 MMHG

## 2023-08-25 DIAGNOSIS — E55.9 HYPOVITAMINOSIS D: ICD-10-CM

## 2023-08-25 DIAGNOSIS — E21.3 HYPERPARATHYROIDISM: ICD-10-CM

## 2023-08-25 DIAGNOSIS — E66.01 CLASS 2 SEVERE OBESITY DUE TO EXCESS CALORIES WITH SERIOUS COMORBIDITY AND BODY MASS INDEX (BMI) OF 35.0 TO 35.9 IN ADULT: ICD-10-CM

## 2023-08-25 DIAGNOSIS — Z79.4 TYPE 2 DIABETES MELLITUS WITH HYPERGLYCEMIA, WITH LONG-TERM CURRENT USE OF INSULIN: Primary | ICD-10-CM

## 2023-08-25 DIAGNOSIS — E11.65 TYPE 2 DIABETES MELLITUS WITH HYPERGLYCEMIA, WITH LONG-TERM CURRENT USE OF INSULIN: Primary | ICD-10-CM

## 2023-08-25 DIAGNOSIS — E03.4 HYPOTHYROIDISM DUE TO ACQUIRED ATROPHY OF THYROID: Chronic | ICD-10-CM

## 2023-08-25 PROCEDURE — 99214 OFFICE O/P EST MOD 30 MIN: CPT | Mod: S$PBB,,, | Performed by: PHYSICIAN ASSISTANT

## 2023-08-25 PROCEDURE — 99999 PR PBB SHADOW E&M-EST. PATIENT-LVL V: ICD-10-PCS | Mod: PBBFAC,,, | Performed by: PHYSICIAN ASSISTANT

## 2023-08-25 PROCEDURE — 99215 OFFICE O/P EST HI 40 MIN: CPT | Mod: PBBFAC,PO | Performed by: PHYSICIAN ASSISTANT

## 2023-08-25 PROCEDURE — 99214 PR OFFICE/OUTPT VISIT, EST, LEVL IV, 30-39 MIN: ICD-10-PCS | Mod: S$PBB,,, | Performed by: PHYSICIAN ASSISTANT

## 2023-08-25 PROCEDURE — 99999 PR PBB SHADOW E&M-EST. PATIENT-LVL V: CPT | Mod: PBBFAC,,, | Performed by: PHYSICIAN ASSISTANT

## 2023-08-25 RX ORDER — ERGOCALCIFEROL 1.25 MG/1
CAPSULE ORAL
Qty: 24 CAPSULE | Refills: 3 | Status: ON HOLD | OUTPATIENT
Start: 2023-08-25 | End: 2024-02-12 | Stop reason: HOSPADM

## 2023-08-25 RX ORDER — INSULIN GLARGINE 100 [IU]/ML
INJECTION, SOLUTION SUBCUTANEOUS
Qty: 30 ML | Refills: 3 | Status: SHIPPED | OUTPATIENT
Start: 2023-08-25

## 2023-08-25 RX ORDER — FUROSEMIDE 40 MG/1
TABLET ORAL
Qty: 90 TABLET | Refills: 1 | Status: SHIPPED | OUTPATIENT
Start: 2023-08-25 | End: 2023-12-27

## 2023-08-25 RX ORDER — INSULIN PUMP SYRINGE, 3 ML
EACH MISCELLANEOUS
Qty: 1 EACH | Refills: 0 | Status: SHIPPED | OUTPATIENT
Start: 2023-08-25 | End: 2024-12-01

## 2023-08-25 RX ORDER — INSULIN LISPRO 100 [IU]/ML
INJECTION, SOLUTION INTRAVENOUS; SUBCUTANEOUS
Qty: 45 ML | Refills: 5 | Status: SHIPPED | OUTPATIENT
Start: 2023-08-25

## 2023-08-25 NOTE — PATIENT INSTRUCTIONS
Medication Changes  Decrease Lantus to 34 units nightly  Change Humalog to 11 u with breakfast, 12 u with lunch and  14 units with dinner.   Continue Onglyza 5 mg daily.

## 2023-08-25 NOTE — PROGRESS NOTES
"   Patient ID:  Annie Mason is a 67 y.o. female.    Chief Complaint:      Patient ID:  Annie Mason is a 66 y.o. female.     Chief Complaint:  Diabetes      Pt presents to follow up for type 2 diabetes, HPT, osteopenia, and review of chronic medical conditions as listed in the visit diagnosis section of this encounter.      PMH also significant for myasthenia gravis.      Onset of Type 2 diabetes mellitus was < 10 years ago.   Hx of CVA but no other macrovascular complications. No microvascular complications. No history of DKA.   Cardiovascular risk factors: advanced age (older than 55 for men, 65 for women), diabetes mellitus, dyslipidemia, hypertension and sedentary lifestyle. No hx of DR per pt. No history of pancreatitis or MEN syndrome.      Currently taking:  Humalog 11-12-12 units with meals   Lantus 40 units at night  Onglyza 5 mg once daily. No hx of pancreatitis or MEN syndrome.       Home blood sugar records:   Fastin-140s  LH: 100s  DN: 160s  BT: 300s    Occ low blood sugar at night-She will eat bread.    Did not tolerate SGLT-2i, GLP-1, or metformin.    Previous meds:  Metformin-Read an article that metformin can cause spots on her legs and stopped taking. She had a tender spot on her right leg.  Farxiga-dizziness, polyuria  Bydrueon-severe nausea     Eye exam in  and no DPR per pt. Has +ve dry eye after cataract surgery      Eats 3 meals daily.   BF-oatmeal, toast, milk  LH-chicken, rice, peaches  DN-ham sandwich, apple, milk  Snacks on chips.      Walks for exercise qod.   Any episodes of hypoglycemia?  Yes, in the morning.      Still having hallucinations. Feels like cars are following her and driving towards her with their lights on. She "knows these things are real." Is not interested in taking more psych meds.     Nodular Thyroid Disease  Thyroid u/s  showed a subcm nodule in the right thyroid. +hoarseness, dysphagia. No sob.     Diabetes Management Status    Statin: " "Taking  ACE/ARB: Not taking    Screening or Prevention Patient's value Goal Complete/Controlled?   HgA1C Testing and Control   Lab Results   Component Value Date    HGBA1C 8.0 (H) 08/17/2023      Annually/Less than 8% Yes   Lipid profile : 11/02/2022 Annually Yes   LDL control Lab Results   Component Value Date    LDLCALC 60.6 (L) 11/02/2022    Annually/Less than 100 mg/dl  Yes   Nephropathy screening Lab Results   Component Value Date    LABMICR 10.0 11/02/2022     Lab Results   Component Value Date    PROTEINUA Negative 03/08/2021    Annually Yes   Blood pressure BP Readings from Last 1 Encounters:   08/25/23 120/70    Less than 140/90 Yes   Dilated retinal exam : 05/23/2023 Annually Yes   Foot exam   : 01/31/2023 Annually Yes     Wt Readings from Last 10 Encounters:   08/25/23 92.4 kg (203 lb 13 oz)   08/25/23 91 kg (200 lb 11.2 oz)   04/05/23 92.4 kg (203 lb 12.8 oz)   03/02/23 92.5 kg (203 lb 14.8 oz)   02/24/23 92.1 kg (203 lb)   01/31/23 93 kg (205 lb)   10/28/22 93.3 kg (205 lb 11 oz)   10/18/22 93.4 kg (205 lb 14.4 oz)   07/22/22 96.6 kg (213 lb)   07/08/22 97.5 kg (215 lb)        Review of Systems   Gastrointestinal:  Negative for abdominal pain.   Musculoskeletal:  Negative for arthralgias.   Neurological:  Positive for weakness.       Objective:   /70 (BP Location: Left arm, Patient Position: Sitting, BP Method: Large (Manual))   Pulse 70   Temp 98.6 °F (37 °C) (Oral)   Ht 5' 3" (1.6 m)   Wt 92.4 kg (203 lb 13 oz)   SpO2 96%   BMI 36.10 kg/m²   BP Readings from Last 3 Encounters:   08/25/23 120/70   08/25/23 116/68   04/05/23 (!) 140/80     Wt Readings from Last 3 Encounters:   08/25/23 1415 92.4 kg (203 lb 13 oz)   08/25/23 1106 91 kg (200 lb 11.2 oz)   04/05/23 1450 92.4 kg (203 lb 12.8 oz)        Physical Exam  Vitals reviewed.   Constitutional:       General: She is not in acute distress.     Appearance: Normal appearance. She is well-developed. She is not ill-appearing, toxic-appearing " or diaphoretic.   HENT:      Head: Normocephalic and atraumatic.   Eyes:      General: No scleral icterus.  Neck:      Trachea: No tracheal deviation.   Pulmonary:      Effort: Pulmonary effort is normal. No respiratory distress.   Neurological:      Mental Status: She is alert and oriented to person, place, and time.   Psychiatric:         Thought Content: Thought content normal.           Lab Results   Component Value Date     08/17/2023    K 4.1 08/17/2023     08/17/2023    CO2 27 08/17/2023    BUN 12 08/17/2023    CREATININE 0.8 08/17/2023    GLU 89 08/17/2023    HGBA1C 8.0 (H) 08/17/2023    MG 1.8 01/13/2022    AST 33 11/02/2022    AST 44 (H) 03/05/2016    ALT 24 11/02/2022    ALBUMIN 3.6 08/17/2023    PROT 6.9 11/02/2022    BILITOT 0.7 11/02/2022    WBC 6-10 (A) 07/15/2023    WBC 6.48 07/22/2022    HGB 12.7 07/22/2022    HCT 38.7 07/22/2022    MCV 93 07/22/2022    MCH 30.5 07/22/2022    MCHC 32.8 07/22/2022    RDW 13.5 07/22/2022     07/22/2022    MPV 10.1 07/22/2022    GRAN 4.2 06/29/2022    GRAN 62.1 06/29/2022    LYMPH 2.0 06/29/2022    LYMPH 28.6 06/29/2022    CHOL 114 (L) 11/02/2022    HDL 45 11/02/2022    LDLCALC 60.6 (L) 11/02/2022    TRIG 42 11/02/2022       Lab Results   Component Value Date    TSH 0.487 11/02/2022    FREET4 0.73 11/02/2022            Latest Ref Rng & Units 11/2/2022    11:30 AM 7/15/2023     5:14 PM 8/17/2023    10:31 AM   Thyroid Labs   TSH 0.400 - 4.000 uIU/mL 0.487      Free T4 0.71 - 1.51 ng/dL 0.73      Sodium 136 - 145 mmol/L 139   139    Potassium 3.5 - 5.1 mmol/L 3.5   4.1    Chloride 95 - 110 mmol/L 103   103    Carbon Dioxide 23 - 29 mmol/L 25   27    Glucose 70 - 110 mg/dL 84   89    Blood Urea Nitrogen 8 - 23 mg/dL 12   12    Creatinine 0.5 - 1.4 mg/dL 0.8   0.8    Calcium 8.7 - 10.5 mg/dL 9.8   9.7    Total Protein 6.0 - 8.4 g/dL 6.9      Albumin 3.5 - 5.2 g/dL 3.7   3.6    Total Bilirubin 0.1 - 1.0 mg/dL 0.7      AST 10 - 40 U/L 33      ALT 10 - 44  U/L 24      Anion Gap 8 - 16 mmol/L 11   9    WBC 0 - 5 /HPF  6-10            This result is from an external source.             Hemoglobin A1C   Date Value Ref Range Status   08/17/2023 8.0 (H) 4.0 - 5.6 % Final     Comment:     ADA Screening Guidelines:  5.7-6.4%  Consistent with prediabetes  >or=6.5%  Consistent with diabetes    High levels of fetal hemoglobin interfere with the HbA1C  assay. Heterozygous hemoglobin variants (HbS, HgC, etc)do  not significantly interfere with this assay.   However, presence of multiple variants may affect accuracy.     02/24/2023 7.9 (H) 0.0 - 5.6 % Final     Comment:     Reference Interval:  5.0 - 5.6 Normal   5.7 - 6.4 High Risk   > 6.5 Diabetic      Hgb A1c results are standardized based on the (NGSP) National   Glycohemoglobin Standardization Program.      Hemoglobin A1C levels are related to mean serum/plasma glucose   during the preceding 2-3 months.        11/02/2022 8.0 (H) 4.0 - 5.6 % Final     Comment:     ADA Screening Guidelines:  5.7-6.4%  Consistent with prediabetes  >or=6.5%  Consistent with diabetes    High levels of fetal hemoglobin interfere with the HbA1C  assay. Heterozygous hemoglobin variants (HbS, HgC, etc)do  not significantly interfere with this assay.   However, presence of multiple variants may affect accuracy.       EXAMINATION:  US SOFT TISSUE HEAD NECK THYROID     CLINICAL HISTORY:  H/o HPT. Look for parathyroid adenoma;.  Hyperparathyroidism, unspecified     TECHNIQUE:  Ultrasound of the thyroid and cervical lymph nodes was performed.     COMPARISON:  None.     FINDINGS:  The thyroid is normal in size. Right lobe of the thyroid measures 3.2 x 1.7 x 1.4 cm with an estimated volume of 4.1 mL.  Left lobe of the thyroid measures 4.1 x 1.0 x 1.6 cm with an estimated volume of 3.4 mL.  Isthmus measures 0.2 cm in thickness.  Total thyroid volume measures 7.5 mL.     Single complex solid and cystic nodule in the upper pole of the right thyroid lobe,  measuring 0.6 x 0.5 x 0.7 cm, which is not meet criteria fine-needle aspiration.  No other thyroid or parathyroid nodules demonstrated.     Normal thyroid perfusion.     No pathologically enlarged cervical lymph nodes.     Impression:     1. Single subcentimeter right thyroid lobe nodule, which does not meet criteria for fine-needle aspiration.  2. No parathyroid nodules demonstrated.        Electronically signed by: Kvng Negron  Date:                                            06/30/2022  Time:                                           13:43    EXAMINATION:  DEXA BONE DENSITY SPINE HIP     CLINICAL HISTORY:  Asymptomatic menopausal state     TECHNIQUE:  DXA scanning was performed over the left hip and lumbar spine.  Review of the images confirms satisfactory positioning and technique.     COMPARISON:  Outside study of 03/12/2020     FINDINGS:  The L1 to L4 vertebral bone mineral density is equal to 1.108 g/cm squared with a T score of -0.4..  Previously 1.184 g per sq cm with T-score -0.1     The left femoral neck bone mineral density is equal to 0.806 g/cm squared with a T score of -0.4.  Previously 0.804 g per sq cm T-score -1.1.     The total hip bone mineral density is equal to 1.039 g/cm squared with a T score of 0.8.     Previously 0.959 g per sq cm with T-score 0.0     There is a 11% risk of a major osteoporotic fracture and a 0.7% risk of hip fracture in the next 10 years (FRAX).     Impression:     Normal bone mineral density     Consider FDA approved medical therapies in postmenopausal women and men aged 50 years and older, based on the following:     *A hip or vertebral (clinical or morphometric) fracture  *T score less than or equal to -2.5 at the femoral neck or spine after appropriate evaluation to exclude secondary causes.  *Low bone mass -- also known as osteopenia (T score between -1.0 and -2.5 at the femoral neck or spine) and a 10 year probability of hip fracture greater than or equal to 3% or a  10 year probability of major osteoporosis-related fracture greater than or equal to 20% based on the [place]-adapted WHO algorithm.  *Clinicians judgment and/or patient preference may indicate treatment for people with 10 year fracture probabilities is above or below these levels.        Electronically signed by: Aishwarya Dorsey MD  Date:                                            11/22/2022  Time:                                           13:16  Assessment and plan:      1. Type 2 diabetes mellitus with hyperglycemia, with long-term current use of insulin        2. Hypothyroidism due to acquired atrophy of thyroid        3. Hyperparathyroidism        4. Class 2 severe obesity due to excess calories with serious comorbidity and body mass index (BMI) of 35.0 to 35.9 in adult           T2DM     Pt with macrovascular complication of CVA.  Pt taking antipsychotics which can exacerbate BG.   Medication Changes  Decrease Lantus to 34 units nightly  Change Humalog to 11 u with breakfast, 12 u with lunch and  14 units with dinner.   Continue onglyza 5 mg daily.     Declines CGM.     A1c is elevated. Goal is <7% if this can be obtained without hypoglycemia.  Pt with labile BG. Has some hyperglycemia. Continue current regimen.       Reviewed how to correct hypoglycemia.     Did not tolerate SGLT-2i or metformin.  Check BG before meals and bedtime. Reviewed benefits of CGM but pt still not interested.    Urine micro wnls.  Counseled pt on low carb/low fat diet and to increase physical activity.  On statin.              Hypothyroidism     Overall is clinically euthyroid. TFTs wnl.. Continue current dose of thyroid hormone. F/u with PCP.            Hyperparathyroidism     Suspect  primary hyperparathyroidism  vit D decreased but ca is normal  pth trended up to 180's  Prior 24 urine calcium 2020- 24 hr ca/cr ratio 0.0099 (U ca 5.9, U Cr 40, S ca 9.5, S Cr 0.59). Check gene test for [facility]  Dexa is wnl.  Forearm BMD wnls. Repeat due  now  Prior renal US 2020 with no stones  Had new onset psychotic episodes ? Related-Though calcium has been wnls. Has some constipation and other none specific sx's.      Discussed indications for surgery if primary hyperparathyroidism proven     Avoid dehydration, excessive calcium supplementation and meds (HCTZ)  that can worsen hypercalcemia.          Relevant Orders      Class 2 severe obesity due to excess calories with serious comorbidity and body mass index (BMI) of 38.0 to 38.9 in adult     BMI 38. Encouraged healthy low fat low carb diet and increase physical activity.                    Referral to DE  PTH, ica, rp, vd  Urine tests  F/u in 4 mths w/ labs prior

## 2023-08-30 DIAGNOSIS — E11.65 TYPE 2 DIABETES MELLITUS WITH HYPERGLYCEMIA, WITH LONG-TERM CURRENT USE OF INSULIN: ICD-10-CM

## 2023-08-30 DIAGNOSIS — Z79.4 TYPE 2 DIABETES MELLITUS WITH HYPERGLYCEMIA, WITH LONG-TERM CURRENT USE OF INSULIN: ICD-10-CM

## 2023-08-30 RX ORDER — SAXAGLIPTIN 5 MG/1
TABLET, FILM COATED ORAL
Qty: 90 TABLET | Refills: 3 | Status: SHIPPED | OUTPATIENT
Start: 2023-08-30

## 2023-09-05 ENCOUNTER — CLINICAL SUPPORT (OUTPATIENT)
Dept: DIABETES | Facility: CLINIC | Age: 68
End: 2023-09-05
Payer: MEDICARE

## 2023-09-05 VITALS — BODY MASS INDEX: 36.06 KG/M2 | WEIGHT: 203.5 LBS | HEIGHT: 63 IN

## 2023-09-05 DIAGNOSIS — E11.65 TYPE 2 DIABETES MELLITUS WITH HYPERGLYCEMIA, WITH LONG-TERM CURRENT USE OF INSULIN: ICD-10-CM

## 2023-09-05 DIAGNOSIS — Z79.4 TYPE 2 DIABETES MELLITUS WITH HYPERGLYCEMIA, WITH LONG-TERM CURRENT USE OF INSULIN: ICD-10-CM

## 2023-09-05 PROCEDURE — 99999 PR PBB SHADOW E&M-EST. PATIENT-LVL II: ICD-10-PCS | Mod: PBBFAC,,, | Performed by: DIETITIAN, REGISTERED

## 2023-09-05 PROCEDURE — 99999PBSHW PR PBB SHADOW TECHNICAL ONLY FILED TO HB: Mod: PBBFAC,,,

## 2023-09-05 PROCEDURE — 99999PBSHW PR PBB SHADOW TECHNICAL ONLY FILED TO HB: ICD-10-PCS | Mod: PBBFAC,,,

## 2023-09-05 PROCEDURE — G0108 DIAB MANAGE TRN  PER INDIV: HCPCS | Mod: PBBFAC,PO | Performed by: DIETITIAN, REGISTERED

## 2023-09-05 PROCEDURE — 99212 OFFICE O/P EST SF 10 MIN: CPT | Mod: PBBFAC,PO | Performed by: DIETITIAN, REGISTERED

## 2023-09-05 PROCEDURE — 99999 PR PBB SHADOW E&M-EST. PATIENT-LVL II: CPT | Mod: PBBFAC,,, | Performed by: DIETITIAN, REGISTERED

## 2023-09-05 NOTE — PROGRESS NOTES
Diabetes Care Specialist Progress Note  Author: Suellen Elizabeth RD, CDE  Date: 9/5/2023    Program Intake  Reason for Diabetes Program Visit:: Initial Diabetes Assessment-Patient in clinic for basic dm education.  She was last seen for education in 2021.  She reports she has been doing better with medication and diet since last appointment and blood sugars have been improving.  Current diabetes risk level:: moderate  In the last 12 months, have you:: none    Lab Results   Component Value Date    HGBA1C 8.0 (H) 08/17/2023     Clinical    Problem Review  Reviewed Problem List with Patient: yes  Active comorbidities affecting diabetes self-care.: no  Reviewed health maintenance: yes    Clinical Assessment  Current Diabetes Treatment: Oral Medication, Insulin-Onglyza, Lantus 34 once daily, Humalog 11-12-14  Have you ever experienced hypoglycemia?: yes-has had 1-2 episodes in 50-60 range when skipping a meal  Are you able to tell when your blood sugar is low?: Yes  What symptoms do you experience?: Shaky    Medication Information  How do you obtain your medications?: Patient drives  How many days a week do you miss your medications?:  Reports better compliance with insulin injections since 8/25 visit  Do you sometimes have difficulty refilling your medications?: No  Medication adherence impacting ability to self-manage diabetes?: No    Labs  Do you have regular lab work to monitor your medications?: Yes  Type of Regular Lab Work: A1c  Lab Compliance Barriers: No    Nutritional Status  Diet: Regular Patient reports she has been trying to watch her diet more closely.  She drinks an occasional regular coke and has been snacking on fruits.  Eats 3 meals daily.  Meal Plan 24 Hour Recall:   Meal Plan 24 Hour Recall - Breakfast:  Grits/ egg and sausage  Meal Plan 24 Hour Recall - Lunch:  Frozen dinner- steak and potatoes and may add corn or peas  Change in appetite?: No  Dentation:: Intact  Recent Changes in Weight: No Recent  Weight Change  Current nutritional status an area of need that is impacting patient's ability to self-manage diabetes?: No    Additional Social History    Support  Does anyone support you with your diabetes care?: yes  Who supports you?: self  Who takes you to your medical appointments?: self  Does the current support meet the patient's needs?: Yes  Is Support an area impacting ability to self-manage diabetes?: No    Access to Mass Media & Technology  Media or technology needs impacting ability to self-manage diabetes?: No    Cognitive/Behavioral Health  Alert and Oriented: Yes  Difficulty Thinking: No  Requires Prompting: No  Requires assistance for routine expression?: No  Cognitive or behavioral barriers impacting ability to self-manage diabetes?: No    Culture/Spiritism  Culture or Shinto beliefs that may impact ability to access healthcare: No    Communication  Language preference: English  Hearing Problems: No  Vision Problems: No  Communication needs impacting ability to self-manage diabetes?: No    Health Literacy  Preferred Learning Method: Face to Face  How often do you need to have someone help you read instructions, pamphlets, or written material from your doctor or pharmacy?: Never  Health literacy needs impacting ability to self-manage diabetes?: No    Diabetes Self-Management Skills Assessment    Diabetes Disease Process/Treatment Options  Patient/caregiver able to state what happens when someone has diabetes.: yes  Patient/caregiver knows what type of diabetes they have.: yes  Diabetes Type : Type II  Patient/caregiver able to identify at least three signs and symptoms of diabetes.: yes  Identified signs and symptoms:: frequent urination, increased thirst  Patient able to identify at least three risk factors for diabetes.: yes  Diabetes Disease Process/Treatment Options: Skills Assessment Completed: Yes  Assessment indicates:: Adequate understanding  Area of need?: Yes    Nutrition/Healthy  Eating  Challenges to healthy eating:: portion control, snacking between meals and at night (sweetened drinks)  Method of carbohydrate measurement:: carb counting/reading labels  Patient can identify foods that impact blood sugar.: yes  Patient-identified foods:: fruit/fruit juice, milk, starches (bread, pasta, rice, cereal), sweets  Nutrition/Healthy Eating Skills Assessment Completed:: Yes  Assessment indicates:: Instruction Needed  Area of need?: Yes    Physical Activity/Exercise  Patient's daily activity level:: lightly active  Patient formally exercises outside of work.: no  Patient can identify reasons why exercise/physical activity is important in diabetes management.: yes  Physical Activity/Exercise Skills Assessment Completed: : Yes  Assessment indicates:: Adequate understanding  Area of need?: Yes    Medications  Patient is able to describe current diabetes management routine.: yes  Diabetes management routine:: insulin, oral medications  Patient is able to identify current diabetes medications, dosages, and appropriate timing of medications.: yes  Patient understands the purpose of the medications taken for diabetes.: yes  Patient reports problems or concerns with current medication regimen.: no  Medication Skills Assessment Completed:: Yes  Assessment indicates:: Adequate understanding  Area of need?: No    Home Blood Glucose Monitoring  Patient states that blood sugar is checked at home daily.: yes  Monitoring Method:: home glucometer  How often do you check your blood sugar?: 4 times a day  When do you check your blood sugar?: Before breakfast, Before lunch, Before dinner, Before bedtime  When you check what is your typical blood sugar range? :  120-200 on average- she finds numbers are improving  Blood glucose logs:: no-keeping logs but did not have them witih her today  Blood glucose logs reviewed today?: no  Home Blood Glucose Monitoring Skills Assessment Completed: : Yes  Assessment indicates::  Adequate understanding  Area of need?: No    Acute Complications  Patient is able to identify types of acute complications: Yes-reviewed symptoms, prevention and treatment of hypoglycemia  Acute Complications Skills Assessment Completed: : Yes  Assessment indicates:: Adequate understanding  Area of need?: No    Chronic Complications  Patient can identify major chronic complications of diabetes.: yes  Stated chronic complications:: heart disease/heart attack  Patient can identify ways to prevent or delay diabetes complications.: yes  Patient is aware that having diabetes increases risk of heart disease?: Yes  Chronic Complications Skills Assessment Completed: : Yes  Assessment indicates:: Adequate understanding  Area of need?: No    Psychosocial/Coping  Patient can identify ways of coping with chronic disease.: yes  Patient-stated ways of coping with chronic disease:: support from loved ones  Psychosocial/Coping Skills Assessment Completed: : Yes  Assessment indicates:: Adequate understanding  Area of need?: No    Assessment Summary and Plan    Based on today's diabetes care assessment, the following areas of need were identified:          9/5/2023    12:01 AM   Social   Support No   Access to Mass Media/Tech No   Cognitive/Behavioral Health No   Culture/Sabianist No   Communication No   Health Literacy No          9/5/2023    12:01 AM   Clinical   Medication Adherence No   Lab Compliance No   Nutritional Status No          9/5/2023    12:01 AM   Diabetes Self-Management Skills   Diabetes Disease Process/Treatment Options Yes- goal blood sugars and A1c goal reviewed   Nutrition/Healthy Eating Yes- care planning created   Physical Activity/Exercise Yes- encouraged regular activity to help enhance improved blood sugar control   Medication No   Home Blood Glucose Monitoring No   Acute Complications No   Chronic Complications No   Psychosocial/Coping No      Today's interventions were provided through individual  discussion, instruction, and written materials were provided.      Patient verbalized understanding of instruction and written materials.  Pt was able to return back demonstration of instructions today. Patient understood key points, needs reinforcement and further instruction.     Diabetes Self-Management Care Plan:    Today's Diabetes Self-Management Care Plan was developed with Annie's input. Annie has agreed to work toward the following goal(s) to improve his/her overall diabetes control.      Care Plan: Diabetes Management   Updates made since 8/6/2023 12:00 AM        Problem: Healthy Eating         Long-Range Goal: Eat 3 meals daily with <45g of Carbohydrate per meal.  Limit snacks to less than 15 grams of carb    Start Date: 9/5/2023   Priority: Medium   Barriers: No Barriers Identified        Task: Reviewed the sources and role of Carbohydrate, Protein, and Fat and how each nutrient impacts blood sugar. Completed 9/5/2023        Task: Provided visual examples using dry measuring cups, food models, and other familiar objects such as computer mouse, deck or cards, tennis ball etc. to help with visualization of portions. Completed 9/5/2023        Task: Explained how to count carbohydrates using the food label and the use of dry measuring cups for accurate carb counting. Completed 9/5/2023        Task: Recommended replacing beverages containing high sugar content with noncaloric/sugar free options and/or water. Completed 9/5/2023        Task: Review the importance of balancing carbohydrates with each meal using portion control techniques to count servings of carbohydrate and label reading to identify serving size and amount of total carbs per serving. Completed 9/5/2023        Task: Provided Sample plate method and reviewed the use of the plate to estimate amounts of carbohydrate per meal. Completed 9/5/2023        Follow Up Plan     Follow up in about 3 months (around 12/5/2023).  Encouraged patient to call  clinic if any new patterns of hypoglycemia occur with recent changes with diet.  Updated written materials provided and encouraged pt to call with any questions/concerns in interim.      Today's care plan and follow up schedule was discussed with patient.  Annie verbalized understanding of the care plan, goals, and agrees to follow up plan.        The patient was encouraged to communicate with his/her health care provider/physician and care team regarding his/her condition(s) and treatment.  I provided the patient with my contact information today and encouraged to contact me via phone or [facility] Patient Portal as needed.     Length of Visit   Total Time: 45 Minutes

## 2023-12-22 PROBLEM — Z78.9 INTOLERANCE OF CONTINUOUS POSITIVE AIRWAY PRESSURE (CPAP) VENTILATION: Status: ACTIVE | Noted: 2023-12-22

## 2023-12-26 DIAGNOSIS — E11.65 TYPE 2 DIABETES MELLITUS WITH HYPERGLYCEMIA, WITH LONG-TERM CURRENT USE OF INSULIN: ICD-10-CM

## 2023-12-26 DIAGNOSIS — Z79.4 TYPE 2 DIABETES MELLITUS WITH HYPERGLYCEMIA, WITH LONG-TERM CURRENT USE OF INSULIN: ICD-10-CM

## 2023-12-27 RX ORDER — FUROSEMIDE 40 MG/1
TABLET ORAL
Qty: 90 TABLET | Refills: 1 | Status: SHIPPED | OUTPATIENT
Start: 2023-12-27 | End: 2024-02-21 | Stop reason: DRUGHIGH

## 2024-02-09 ENCOUNTER — TELEPHONE (OUTPATIENT)
Dept: PSYCHIATRY | Facility: CLINIC | Age: 69
End: 2024-02-09
Payer: MEDICARE

## 2024-02-09 PROBLEM — I50.32 CHRONIC DIASTOLIC HEART FAILURE: Status: ACTIVE | Noted: 2024-02-09

## 2024-02-09 PROBLEM — R44.1 HALLUCINATIONS, VISUAL: Status: RESOLVED | Noted: 2022-02-23 | Resolved: 2024-02-09

## 2024-02-09 PROBLEM — F22 DELUSIONAL DISORDER: Chronic | Status: RESOLVED | Noted: 2021-12-23 | Resolved: 2024-02-09

## 2024-02-09 PROBLEM — R07.9 CHEST PAIN: Status: ACTIVE | Noted: 2024-02-09

## 2024-02-09 NOTE — TELEPHONE ENCOUNTER
Returned patient call to schedule appointment with provider. Patient accepted the appointment offered. No further questions or concerns at this time.

## 2024-02-10 PROBLEM — R94.39 ABNORMAL STRESS TEST: Status: ACTIVE | Noted: 2024-02-10

## 2024-02-10 PROBLEM — I50.32 CHRONIC DIASTOLIC HEART FAILURE: Status: RESOLVED | Noted: 2024-02-09 | Resolved: 2024-02-10

## 2024-02-14 DIAGNOSIS — E55.9 VITAMIN D DEFICIENCY, UNSPECIFIED: ICD-10-CM

## 2024-02-15 RX ORDER — ERGOCALCIFEROL 1.25 MG/1
50000 CAPSULE ORAL
Qty: 4 CAPSULE | Refills: 5 | Status: SHIPPED | OUTPATIENT
Start: 2024-02-15

## 2024-02-20 ENCOUNTER — OUTPATIENT CASE MANAGEMENT (OUTPATIENT)
Dept: ADMINISTRATIVE | Facility: OTHER | Age: 69
End: 2024-02-20
Payer: MEDICARE

## 2024-02-28 PROBLEM — F20.0 PARANOID SCHIZOPHRENIA: Status: ACTIVE | Noted: 2024-02-28

## 2024-04-04 ENCOUNTER — OFFICE VISIT (OUTPATIENT)
Dept: ENDOCRINOLOGY | Facility: CLINIC | Age: 69
End: 2024-04-04
Payer: MEDICARE

## 2024-04-04 VITALS
BODY MASS INDEX: 36.09 KG/M2 | OXYGEN SATURATION: 96 % | DIASTOLIC BLOOD PRESSURE: 78 MMHG | HEIGHT: 63 IN | HEART RATE: 69 BPM | TEMPERATURE: 99 F | SYSTOLIC BLOOD PRESSURE: 130 MMHG | WEIGHT: 203.69 LBS

## 2024-04-04 DIAGNOSIS — Z79.4 TYPE 2 DIABETES MELLITUS WITH HYPERGLYCEMIA, WITH LONG-TERM CURRENT USE OF INSULIN: Primary | ICD-10-CM

## 2024-04-04 DIAGNOSIS — E21.3 HYPERPARATHYROIDISM: ICD-10-CM

## 2024-04-04 DIAGNOSIS — E11.65 TYPE 2 DIABETES MELLITUS WITH HYPERGLYCEMIA, WITH LONG-TERM CURRENT USE OF INSULIN: Primary | ICD-10-CM

## 2024-04-04 DIAGNOSIS — E66.01 CLASS 2 SEVERE OBESITY DUE TO EXCESS CALORIES WITH SERIOUS COMORBIDITY AND BODY MASS INDEX (BMI) OF 35.0 TO 35.9 IN ADULT: ICD-10-CM

## 2024-04-04 DIAGNOSIS — E03.4 HYPOTHYROIDISM DUE TO ACQUIRED ATROPHY OF THYROID: ICD-10-CM

## 2024-04-04 PROCEDURE — 99215 OFFICE O/P EST HI 40 MIN: CPT | Mod: PBBFAC,PO | Performed by: PHYSICIAN ASSISTANT

## 2024-04-04 PROCEDURE — 99999 PR PBB SHADOW E&M-EST. PATIENT-LVL V: CPT | Mod: PBBFAC,,, | Performed by: PHYSICIAN ASSISTANT

## 2024-04-04 PROCEDURE — 99214 OFFICE O/P EST MOD 30 MIN: CPT | Mod: S$PBB,,, | Performed by: PHYSICIAN ASSISTANT

## 2024-04-04 RX ORDER — INSULIN GLARGINE 100 [IU]/ML
INJECTION, SOLUTION SUBCUTANEOUS
Qty: 30 ML | Refills: 3 | Status: SHIPPED | OUTPATIENT
Start: 2024-04-04

## 2024-04-04 RX ORDER — INSULIN LISPRO 100 [IU]/ML
INJECTION, SOLUTION INTRAVENOUS; SUBCUTANEOUS
Qty: 45 ML | Refills: 5 | Status: SHIPPED | OUTPATIENT
Start: 2024-04-04

## 2024-04-04 NOTE — PROGRESS NOTES
"   Patient ID:  Annie Mason is a 68 y.o. female.    Chief Complaint:      Patient ID:  Annie Mason is a 66 y.o. female.     Chief Complaint:  Diabetes      Pt presents to follow up for type 2 diabetes, HPT, osteopenia, and review of chronic medical conditions as listed in the visit diagnosis section of this encounter.      PMH also significant for myasthenia gravis.      Onset of Type 2 diabetes mellitus was < 10 years ago.   Hx of CVA but no other macrovascular complications. No microvascular complications. No history of DKA.   Cardiovascular risk factors: advanced age (older than 55 for men, 65 for women), diabetes mellitus, dyslipidemia, hypertension and sedentary lifestyle. No hx of DR per pt. No history of pancreatitis or MEN syndrome.      Currently taking:  Humalog 11-12-14 units with meals   Lantus 34 units at night  Onglyza 5 mg once daily (stopped at last discharge). No hx of pancreatitis or MEN syndrome.       Home blood sugar records:       Occ low blood sugar at night-She will eat bread.    Did not tolerate SGLT-2i, GLP-1, or metformin.    Previous meds:  Metformin-Read an article that metformin can cause spots on her legs and stopped taking. She had a tender spot on her right leg.  Farxiga-dizziness, polyuria  Bydrueon-severe nausea     Eye exam in 1/24 and no DPR per pt. Has +ve dry eye after cataract surgery     Eats 3 meals daily.   Snacks on chips.      Exercise:Active at work as a CNA.   Any episodes of hypoglycemia?  Yes, in the morning.      Still having hallucinations. Feels like cars are following her and driving towards her with their lights on. She "knows these things are real." Is not interested in taking more psych meds.     Nodular Thyroid Disease  Thyroid u/s 8/23 showed a subcm nodule in the right thyroid.   +hoarseness, dysphagia. No sob.     Hypothyroidism  + palpations,   No wt changes, hair loss, constipation/diarrhea or mental fog.    Hyperparathyroidism  Hx of renal " "stones  None recently  + occ muscle weakness  No n/v, abdominal pain or fractures.  Vd is nromal.    Dexa 11/22 was wnl  Diabetes Management Status    Statin: Taking  ACE/ARB: Not taking    Screening or Prevention Patient's value Goal Complete/Controlled?   HgA1C Testing and Control   Lab Results   Component Value Date    HGBA1C 8.7 (H) 03/21/2024      Annually/Less than 8% Yes   Lipid profile : 03/21/2024 Annually Yes   LDL control Lab Results   Component Value Date    LDLCALC 46.2 (L) 03/21/2024    Annually/Less than 100 mg/dl  Yes   Nephropathy screening Lab Results   Component Value Date    LABMICR 10.0 11/02/2022     Lab Results   Component Value Date    PROTEINUA Negative 02/09/2024    Annually Yes   Blood pressure BP Readings from Last 1 Encounters:   04/04/24 130/78    Less than 140/90 Yes   Dilated retinal exam : 02/23/2023 Annually Yes   Foot exam   : 01/31/2023 Annually Yes     Wt Readings from Last 10 Encounters:   04/04/24 92.4 kg (203 lb 11.3 oz)   03/15/24 93 kg (205 lb)   02/28/24 93.1 kg (205 lb 4.8 oz)   02/21/24 93.4 kg (206 lb)   02/19/24 93 kg (205 lb 0.4 oz)   02/09/24 93 kg (205 lb 0.4 oz)   02/09/24 93.6 kg (206 lb 4.8 oz)   12/22/23 93.9 kg (207 lb)   11/24/23 91.2 kg (201 lb)   09/15/23 91.5 kg (201 lb 12.8 oz)        Review of Systems   Gastrointestinal:  Negative for abdominal pain.   Musculoskeletal:  Negative for arthralgias.   Neurological:  Positive for weakness.       Objective:   /78 (BP Location: Right arm, Patient Position: Sitting, BP Method: Small (Manual))   Pulse 69   Temp 98.8 °F (37.1 °C) (Oral)   Ht 5' 3" (1.6 m)   Wt 92.4 kg (203 lb 11.3 oz)   SpO2 96%   BMI 36.08 kg/m²   BP Readings from Last 3 Encounters:   04/04/24 130/78   02/28/24 136/78   02/21/24 (!) 142/76     Wt Readings from Last 10 Encounters:   04/04/24 92.4 kg (203 lb 11.3 oz)   03/15/24 93 kg (205 lb)   02/28/24 93.1 kg (205 lb 4.8 oz)   02/21/24 93.4 kg (206 lb)   02/19/24 93 kg (205 lb 0.4 oz) "   02/09/24 93 kg (205 lb 0.4 oz)   02/09/24 93.6 kg (206 lb 4.8 oz)   12/22/23 93.9 kg (207 lb)   11/24/23 91.2 kg (201 lb)   09/15/23 91.5 kg (201 lb 12.8 oz)         Physical Exam  Vitals reviewed.   Constitutional:       General: She is not in acute distress.     Appearance: Normal appearance. She is well-developed. She is not ill-appearing, toxic-appearing or diaphoretic.   HENT:      Head: Normocephalic and atraumatic.   Eyes:      General: No scleral icterus.  Neck:      Trachea: No tracheal deviation.   Pulmonary:      Effort: Pulmonary effort is normal. No respiratory distress.   Neurological:      Mental Status: She is alert and oriented to person, place, and time.   Psychiatric:         Thought Content: Thought content normal.       4/24  Foot Exam: no sores or macerations noted.     Protective Sensation (w/ 10 gram monofilament):  Right: Intact  Left: Intact    Visual Inspection:  Normal -  Bilateral, Nails Intact - without Evidence of Foot Deformity- Bilateral, and Dry Skin -  Bilateral    Pedal Pulses:   Right: Present  Left: Present    Posterior Tibialis Pulses:   Right:Present  Left: Present     Vibratory Sensation  Right:Positive  Left:Positive     Lab Results   Component Value Date     03/21/2024    K 3.4 (L) 03/21/2024     03/21/2024    CO2 31 03/21/2024    BUN 16 03/21/2024    CREATININE 0.76 03/21/2024    GLU 77 03/21/2024    HGBA1C 8.7 (H) 03/21/2024    MG 2.0 02/11/2024    AST 48 (H) 02/19/2024    AST 44 (H) 03/05/2016    ALT 28 02/19/2024    ALBUMIN 4.1 03/21/2024    PROT 8.2 02/19/2024    BILITOT 0.9 02/19/2024    WBC 8.06 02/19/2024    HGB 13.7 02/19/2024    HCT 41.9 02/19/2024    MCV 94 02/19/2024    MCH 30.7 02/19/2024    MCHC 32.7 02/19/2024    RDW 12.9 02/19/2024     02/19/2024    MPV 9.8 02/19/2024    GRAN 4.8 02/19/2024    GRAN 59.3 02/19/2024    LYMPH 2.4 02/19/2024    LYMPH 30.3 02/19/2024    CHOL 145 03/21/2024    HDL 83 (H) 03/21/2024    LDLCALC 46.2 (L)  03/21/2024    TRIG 79 03/21/2024       Lab Results   Component Value Date    TSH 1.330 03/21/2024    FREET4 0.87 03/21/2024            Latest Ref Rng & Units 2/11/2024     5:41 AM 2/19/2024    12:22 PM 3/21/2024     9:53 AM   Thyroid Labs   TSH 0.400 - 4.000 uIU/mL   1.330    Free T4 0.78 - 2.19 ng/dL   0.87    Sodium 136 - 145 mmol/L 137  137  139    Potassium 3.5 - 5.1 mmol/L 3.8  5.2  3.4    Chloride 95 - 110 mmol/L 103  103  103    Carbon Dioxide 22 - 31 mmol/L 29  27  31    Glucose 70 - 110 mg/dL 209  113  77    Blood Urea Nitrogen 7 - 18 mg/dL 17  16  16    Creatinine 0.50 - 1.40 mg/dL 0.68  0.77  0.76    Calcium 8.4 - 10.2 mg/dL 10.1  10.1  10.1    Total Protein 6.0 - 8.4 g/dL  8.2     Albumin 3.5 - 5.2 g/dL  4.7  4.1    Total Bilirubin 0.2 - 1.3 mg/dL  0.9     AST 14 - 36 U/L  48     ALT 0 - 35 U/L  28     Anion Gap 5 - 12 mmol/L 5  7  5    WBC 3.90 - 12.70 K/uL 6.65  8.06     RBC 4.00 - 5.40 M/uL 4.24  4.46     Hemoglobin 12.0 - 16.0 g/dL 13.1  13.7     Hematocrit 37.0 - 48.5 % 38.9  41.9     MCV 82 - 98 fL 92  94     MCH 27.0 - 31.0 pg 30.9  30.7     MCHC 32.0 - 36.0 g/dL 33.7  32.7     RDW 11.5 - 14.5 % 13.2  12.9     Platelets 150 - 450 K/uL 154  228     MPV 9.2 - 12.9 fL 11.5  9.8     Gran # 1.8 - 7.7 K/uL 4.6  4.8     Lymph # 1.0 - 4.8 K/uL 1.4  2.4     Mono # 0.3 - 1.0 K/uL 0.5  0.6     Eos # 0.0 - 0.5 K/uL 0.1  0.1     Baso # 0.00 - 0.20 K/uL 0.03  0.06     Gran % 38.0 - 73.0 % 68.9  59.3     Lymph % 18.0 - 48.0 % 21.5  30.3     Mono% 4.0 - 15.0 % 7.1  7.7     Eos % 0.0 - 8.0 % 1.8  1.6     Baso % 0.0 - 1.9 % 0.5  0.7         Hemoglobin A1C   Date Value Ref Range Status   03/21/2024 8.7 (H) 0.0 - 5.6 % Final     Comment:     Reference Interval:  5.0 - 5.6 Normal   5.7 - 6.4 High Risk   > 6.5 Diabetic      Hgb A1c results are standardized based on the (NGSP) National   Glycohemoglobin Standardization Program.      Hemoglobin A1C levels are related to mean serum/plasma glucose   during the preceding  2-3 months.        02/10/2024 8.9 (H) 0.0 - 5.6 % Final     Comment:     Reference Interval:  5.0 - 5.6 Normal   5.7 - 6.4 High Risk   > 6.5 Diabetic      Hgb A1c results are standardized based on the (NGSP) National   Glycohemoglobin Standardization Program.      Hemoglobin A1C levels are related to mean serum/plasma glucose   during the preceding 2-3 months.        08/17/2023 8.0 (H) 4.0 - 5.6 % Final     Comment:     ADA Screening Guidelines:  5.7-6.4%  Consistent with prediabetes  >or=6.5%  Consistent with diabetes    High levels of fetal hemoglobin interfere with the HbA1C  assay. Heterozygous hemoglobin variants (HbS, HgC, etc)do  not significantly interfere with this assay.   However, presence of multiple variants may affect accuracy.       8/23 Thyroid u/s  FINDINGS:  The right thyroid lobe measures 2.7 x 2.2 x 1.5 cm.  The left thyroid lobe measures 3.0 x 1.5 x 0.9 cm.  The total thyroid weight is 6.7 g.  There is a 6 mm smooth, circumscribed, wider than tall, primarily solid isoechoic nodule anteriorly in the upper pole of the right thyroid lobe.  There is diffusely diminished arterial and venous color flow present throughout the thyroid gland.  No new pathologically enlarged or morphologically abnormal lymph nodes in the left or right neck adjacent to the thyroid gland.    11/22 DXA  COMPARISON:  Outside study of 03/12/2020     FINDINGS:  The L1 to L4 vertebral bone mineral density is equal to 1.108 g/cm squared with a T score of -0.4..  Previously 1.184 g per sq cm with T-score -0.1     The left femoral neck bone mineral density is equal to 0.806 g/cm squared with a T score of -0.4.  Previously 0.804 g per sq cm T-score -1.1.     The total hip bone mineral density is equal to 1.039 g/cm squared with a T score of 0.8.     Previously 0.959 g per sq cm with T-score 0.0     There is a 11% risk of a major osteoporotic fracture and a 0.7% risk of hip fracture in the next 10 years (FRAX).     Impression:      Normal bone mineral density     Assessment and plan:      1. Type 2 diabetes mellitus with hyperglycemia, with long-term current use of insulin  Ambulatory referral/consult to Diabetes Education    Hemoglobin A1C    TSH    T4, Free    Renal Function Panel    insulin (LANTUS SOLOSTAR U-100 INSULIN) glargine 100 units/mL SubQ pen    insulin lispro (HUMALOG KWIKPEN INSULIN) 100 unit/mL pen      2. Hypothyroidism due to acquired atrophy of thyroid        3. Hyperparathyroidism        4. Class 2 severe obesity due to excess calories with serious comorbidity and body mass index (BMI) of 35.0 to 35.9 in adult           T2DM     Pt with macrovascular complication of CVA.  Pt taking antipsychotics which can exacerbate BG.   Medication Changes  Increase Lantus to 40 units nightly  Change Humalog to 11 u with breakfast, 12 u with lunch and 16 units with dinner.      Declines CGM.     A1c is elevated. Goal is <7% if this can be obtained without hypoglycemia.  Pt with labile BG. Has some hyperglycemia. Continue current regimen.       Reviewed how to correct hypoglycemia.     Did not tolerate SGLT-2i or metformin.  Check BG before meals and bedtime. Reviewed benefits of CGM but pt still not interested.    Urine micro wnls.  Counseled pt on low carb/low fat diet and to increase physical activity.  On statin.              Hypothyroidism     Overall is clinically euthyroid. TFTs wnl.. Continue current dose of thyroid hormone. F/u with PCP.            Hyperparathyroidism     Suspect  primary hyperparathyroidism  vit D & ca are wnl  pth trended up to 180's>>130  Prior 24 urine calcium 2020- 24 hr ca/cr ratio 0.0099 (U ca 5.9, U Cr 40, S ca 9.5, S Cr 0.59). Gene test for [facility]--never done  Will recheck urine  Dexa is wnl.  Forearm BMD wnls. Repeat due now  Prior renal US 2020 with no stones  Had new onset psychotic episodes ? Related-Though calcium has been wnls. Has some constipation and other none specific sx's.      Discussed  indications for surgery if primary hyperparathyroidism proven     Avoid dehydration, excessive calcium supplementation and meds (HCTZ)  that can worsen hypercalcemia.          Relevant Orders      Class 2 severe obesity due to excess calories with serious comorbidity and body mass index (BMI) of 38.0 to 38.9 in adult     Body mass index is 36.08 kg/m².  Encouraged healthy low fat low carb diet and increase physical activity.                    Referral to DE  F/u in 4 mths w/ labs prior -A1c, tfts, rp-in [place]

## 2024-04-04 NOTE — PATIENT INSTRUCTIONS
Medication Changes  Increase Lantus to 40 units nightly  Change Humalog to 11 u with breakfast, 12 u with lunch and 16 units with dinner.

## 2024-04-16 PROBLEM — Z03.89 CORONARY ARTERY DISEASE (CAD) EXCLUDED: Status: ACTIVE | Noted: 2024-04-16

## 2024-04-19 ENCOUNTER — NUTRITION (OUTPATIENT)
Dept: DIABETES | Facility: CLINIC | Age: 69
End: 2024-04-19
Payer: MEDICARE

## 2024-04-19 VITALS — WEIGHT: 199.44 LBS | BODY MASS INDEX: 35.34 KG/M2 | HEIGHT: 63 IN

## 2024-04-19 DIAGNOSIS — E11.65 TYPE 2 DIABETES MELLITUS WITH HYPERGLYCEMIA, WITH LONG-TERM CURRENT USE OF INSULIN: ICD-10-CM

## 2024-04-19 DIAGNOSIS — Z79.4 TYPE 2 DIABETES MELLITUS WITH HYPERGLYCEMIA, WITH LONG-TERM CURRENT USE OF INSULIN: ICD-10-CM

## 2024-04-19 PROCEDURE — 99999PBSHW PR PBB SHADOW TECHNICAL ONLY FILED TO HB: Mod: PBBFAC,,,

## 2024-04-19 PROCEDURE — 99211 OFF/OP EST MAY X REQ PHY/QHP: CPT | Mod: PBBFAC,PO | Performed by: DIETITIAN, REGISTERED

## 2024-04-19 PROCEDURE — 99999 PR PBB SHADOW E&M-EST. PATIENT-LVL I: CPT | Mod: PBBFAC,,, | Performed by: DIETITIAN, REGISTERED

## 2024-04-19 PROCEDURE — G0108 DIAB MANAGE TRN  PER INDIV: HCPCS | Mod: PBBFAC,PO | Performed by: DIETITIAN, REGISTERED

## 2024-04-19 NOTE — PROGRESS NOTES
"Diabetes Care Specialist Progress Note  Author: Shara Benson RD  Date: 4/19/2024    Program Intake  Reason for Diabetes Program Visit:: Intervention  Type of Intervention:: Individual  Current diabetes risk level:: high  In the last 12 months, have you:: none  Permission to speak with others about care:: no  Continuous Glucose Monitoring  Patient has CGM: No    Lab Results   Component Value Date    HGBA1C 8.7 (H) 03/21/2024       Clinical    Weight: 90.5 kg (199 lb 6.5 oz)   Height: 5' 3" (160 cm)   Body mass index is 35.32 kg/m².    Clinical Assessment  Current Diabetes Treatment: Insulin (40 units Lantus, Humalog 11-12-16. Patient taking 12-14-16)  Have you ever experienced hypoglycemia (low blood sugar)?: yes  In the last month, how often have you experienced low blood sugar?:  (Patient reports she has not had a hypoglycemic episode since she has her insulin doses adjusted when she first saw Светлана Garcia.)  Are you able to tell when your blood sugar is low?: Yes  What symptoms do you experience?: Shaky  Have you ever been hospitalized because your blood sugar was too low?: no  How do you treat hypoglycemia (low blood sugar)?: 1/2 can soda/fruit juice, other (Slice of bread and peanut butter)  Have you ever experienced hyperglycemia (high blood sugar)?: yes  In the last month, how often have you experienced high blood sugar?: other (see comments) (none)  Have you ever been hospitalized because your blood sugar was high?: no    Medication Information  How do you obtain your medications?: Patient drives  How many days a week do you miss your medications?: Never  Do you sometimes have difficulty refilling your medications?: No  Medication adherence impacting ability to self-manage diabetes?: No    Nutritional Status  Meal Plan 24 Hour Recall: Breakfast, Lunch, Dinner, Snack (reports she does not snack)  Meal Plan 24 Hour Recall - Breakfast: grits + eggs, eggs + edmonds, eggs + edmonds + toast, oatmeal, hashbrowns, " english douglasin  Meal Plan 24 Hour Recall - Lunch: salad or skip  Change in appetite?: No  Dentation:: Intact  Recent Changes in Weight: No Recent Weight Change  Current nutritional status an area of need that is impacting patient's ability to self-manage diabetes?: No    Additional Social History  Support  Who supports you?: self  Who takes you to your medical appointments?: self  Does the current support meet the patient's needs?: Yes  Is Support an area impacting ability to self-manage diabetes?: No    Access to Mass Media & Technology  Does the patient have access to any of the following devices or technologies?: Smart phone  Media or technology needs impacting ability to self-manage diabetes?: No    Cognitive/Behavioral Health  Alert and Oriented: Yes  Difficulty Thinking: No  Requires Prompting: No  Requires assistance for routine expression?: No  Cognitive or behavioral barriers impacting ability to self-manage diabetes?: No    Communication  Language preference: English  Hearing Problems: No  Vision Problems: No  Communication needs impacting ability to self-manage diabetes?: No    Diabetes Self-Management Skills Assessment  Nutrition/Healthy Eating  Challenges to healthy eating:: other (see comments) (sugar sweetened beverages)  Method of carbohydrate measurement:: eyeballing/guessing  Patient can identify foods that impact blood sugar.: yes  Patient-identified foods:: fruit/fruit juice, milk, starches (bread, pasta, rice, cereal), sweets, soda  Nutrition/Healthy Eating Skills Assessment Completed:: Yes  Assessment indicates:: Instruction Needed  Area of need?: Yes    Physical Activity/Exercise  Physical Activity/Exercise Skills Assessment Completed: : No  Deffered due to:: Time    Medications  Patient is able to describe current diabetes management routine.: yes  Diabetes management routine:: insulin (40 units Lantus, Humalog 11-12-16. Patient taking 12-14-16)  Patient is able to identify current diabetes  medications, dosages, and appropriate timing of medications.: no  Patient understands the purpose of the medications taken for diabetes.: yes  Patient reports problems or concerns with current medication regimen.: no  Medication Skills Assessment Completed:: Yes  Assessment indicates:: Adequate understanding, Instruction Needed  Area of need?: Yes    Home Blood Glucose Monitoring  Patient states that blood sugar is checked at home daily.: yes  Monitoring Method:: home glucometer  How often do you check your blood sugar?: 3 times a day  When do you check your blood sugar?: Before breakfast, Before lunch, Before dinner  When you check what is your typical blood sugar range? : Pt reports BG much better control, but only provided 89, 127, and 219 mg/dl  Blood glucose logs:: encouraged to bring logs to provider visits, encouraged to keep logs, yes (Pt keeps Bg logs but did not bring them with her today.)  Blood glucose logs reviewed today?: no  Home Blood Glucose Monitoring Skills Assessment Completed: : Yes  Assessment indicates:: Adequate understanding  Area of need?: No    Acute Complications  Acute Complications Skills Assessment Completed: : No  Deffered due to:: Time  Area of need?: No    Chronic Complications  Chronic Complications Skills Assessment Completed: : No  Deferred due to:: Time    Psychosocial/Coping  Psychosocial/Coping Skills Assessment Completed: : No  Deffered due to:: Time      Assessment Summary and Plan    Based on today's diabetes care assessment, the following areas of need were identified:          4/19/2024    12:01 AM   Social   Support No   Access to Mass Media/Tech No   Cognitive/Behavioral Health No   Communication No            4/19/2024    12:01 AM   Clinical   Medication Adherence No   Nutritional Status No            4/19/2024    12:01 AM   Diabetes Self-Management Skills   Nutrition/Healthy Eating Yes, see care plan.   Medication Yes, see care plan.  Reviewed and provided written  "education of currently prescribed insulin doses. She skips insulin doses if she skips the meal.   Home Blood Glucose Monitoring Patient is taking BG before meals and has log at home.   Acute Complications Reviewed rule of 15/15.          Today's interventions were provided through individual discussion, instruction, and written materials were provided.      Patient verbalized understanding of instruction and written materials.  Pt was able to return back demonstration of instructions today. Patient understood key points, needs reinforcement and further instruction.     Diabetes Self-Management Care Plan:    Today's Diabetes Self-Management Care Plan was developed with Annie's input. Annie has agreed to work toward the following goal(s) to improve his/her overall diabetes control.      Care Plan: Diabetes Management   Updates made since 3/20/2024 12:00 AM        Problem: Healthy Eating         Long-Range Goal: Eat 3 meals daily with 30-45g of Carbohydrate per meal.  Limit snacks to less than 15 grams of carb    Start Date: 9/5/2023   Expected End Date: 6/21/2024   Priority: Medium   Barriers: No Barriers Identified   Note:    4/19/24: Patient presented today reporting she eats breakfast daily and dinner, but often skips lunch.  She drinks one Coca Cola and/or fruit juice daily. Discussed drinking these types of drinks even in smaller quanties makes a significant impact on BG. Discussed pairing protein with carbohydrates at each meal to slow the post prandial spike of BG.       Problem: Medications         Goal: Patient Agrees to take Humalog 11-12-16 before meals and Lantus 40 units at night.  She will skip dose of insulin if she skips meals.    Start Date: 4/19/2024   Expected End Date: 6/21/2024   Priority: High   Barriers: No Barriers Identified   Note:    Patient prescribed Humalog 11-12-16. Patient taking 12-14-16. She discussed "lows in the 80s"  discussed with patient her prescribed doses are slightly lower " so her BG will not be on the low side of the normal range. Goal of  mg/dl before meals provided. Reviewed how to properly treat hypoglycemia under 70 with rule of 15/15. Only eat peanut butter with bread once BG reading is above 70 mg/dl.       Task: Reviewed with patient all current diabetes medications and provided basic review of the purpose, dosage, frequency, side effects, and storage of both oral and injectable diabetes medications. Completed 4/19/2024        Task: Reviewed possible resources for acquiring cost prohibitive medication.         Task: Taught patient how to self inject medication         Task: Discussed guidelines for preventing, detecting and treating hypoglycemia and hyperglycemia and reviewed the importance of meal and medication timing with diabetes mediations for prevention of hypoglycemia and maximum drug benefit. Completed 4/19/2024          Follow Up Plan     Follow up in about 2 months (around 6/19/2024). Patient received call and she is having a family emergency so only an abbreviated appointment was completed.  Patient now takes BG before meals, but she did not present with BG log today.  Patient Prescribed 11-12-16 units of Humalog, but she is taking 12-14-16 and reports BG readings in 70s and 80s.  Provided BG log with prescribed doses written at top of columns for each meal.  Explained these readings are on the low end of normal and provided goal of  mg/dl before meals. Reviewed the rule of 15/15 for proper treatment of hypoglycemia. She reports she has not had hypoglycemia since first seeing KARL Caputo who changed insulin doses.      Patient asked to have zero intake of sugar sweetened beverages.     Today's care plan and follow up schedule was discussed with patient.  Annie verbalized understanding of the care plan, goals, and agrees to follow up plan.        The patient was encouraged to communicate with his/her health care provider/physician and care team  regarding his/her condition(s) and treatment.  I provided the patient with my contact information today and encouraged to contact me via phone or [facility]'s Patient Portal as needed.     Length of Visit   Total Time: 30 Minutes

## 2024-07-22 DIAGNOSIS — E03.9 ACQUIRED HYPOTHYROIDISM: ICD-10-CM

## 2024-07-22 RX ORDER — LEVOTHYROXINE SODIUM 50 UG/1
TABLET ORAL
Qty: 90 TABLET | Refills: 3 | Status: SHIPPED | OUTPATIENT
Start: 2024-07-22

## 2024-08-21 ENCOUNTER — LAB VISIT (OUTPATIENT)
Dept: LAB | Facility: HOSPITAL | Age: 69
End: 2024-08-21
Attending: INTERNAL MEDICINE
Payer: MEDICARE

## 2024-08-21 DIAGNOSIS — E11.65 TYPE 2 DIABETES MELLITUS WITH HYPERGLYCEMIA, WITH LONG-TERM CURRENT USE OF INSULIN: ICD-10-CM

## 2024-08-21 DIAGNOSIS — Z79.4 TYPE 2 DIABETES MELLITUS WITH HYPERGLYCEMIA, WITH LONG-TERM CURRENT USE OF INSULIN: ICD-10-CM

## 2024-08-21 LAB
ALBUMIN SERPL BCP-MCNC: 3.8 G/DL (ref 3.5–5.2)
ALBUMIN/CREAT UR: 36.8 UG/MG (ref 0–30)
ANION GAP SERPL CALC-SCNC: 12 MMOL/L (ref 8–16)
BUN SERPL-MCNC: 11 MG/DL (ref 8–23)
CALCIUM SERPL-MCNC: 10.3 MG/DL (ref 8.7–10.5)
CHLORIDE SERPL-SCNC: 104 MMOL/L (ref 95–110)
CO2 SERPL-SCNC: 23 MMOL/L (ref 23–29)
CREAT SERPL-MCNC: 1 MG/DL (ref 0.5–1.4)
CREAT UR-MCNC: 125 MG/DL (ref 15–325)
EST. GFR  (NO RACE VARIABLE): >60 ML/MIN/1.73 M^2
ESTIMATED AVG GLUCOSE: 258 MG/DL (ref 68–131)
GLUCOSE SERPL-MCNC: 299 MG/DL (ref 70–110)
HBA1C MFR BLD: 10.6 % (ref 4–5.6)
MICROALBUMIN UR DL<=1MG/L-MCNC: 46 UG/ML
PHOSPHATE SERPL-MCNC: 2.8 MG/DL (ref 2.7–4.5)
POTASSIUM SERPL-SCNC: 4 MMOL/L (ref 3.5–5.1)
SODIUM SERPL-SCNC: 139 MMOL/L (ref 136–145)
T4 FREE SERPL-MCNC: 0.86 NG/DL (ref 0.71–1.51)
TSH SERPL DL<=0.005 MIU/L-ACNC: 0.72 UIU/ML (ref 0.4–4)

## 2024-08-21 PROCEDURE — 84439 ASSAY OF FREE THYROXINE: CPT | Performed by: PHYSICIAN ASSISTANT

## 2024-08-21 PROCEDURE — 80069 RENAL FUNCTION PANEL: CPT | Performed by: PHYSICIAN ASSISTANT

## 2024-08-21 PROCEDURE — 83036 HEMOGLOBIN GLYCOSYLATED A1C: CPT | Performed by: PHYSICIAN ASSISTANT

## 2024-08-21 PROCEDURE — 82570 ASSAY OF URINE CREATININE: CPT | Performed by: PHYSICIAN ASSISTANT

## 2024-08-21 PROCEDURE — 84443 ASSAY THYROID STIM HORMONE: CPT | Performed by: PHYSICIAN ASSISTANT

## 2024-11-07 ENCOUNTER — OFFICE VISIT (OUTPATIENT)
Dept: ENDOCRINOLOGY | Facility: CLINIC | Age: 69
End: 2024-11-07
Payer: MEDICARE

## 2024-11-07 VITALS
DIASTOLIC BLOOD PRESSURE: 77 MMHG | HEART RATE: 61 BPM | HEIGHT: 63 IN | BODY MASS INDEX: 34.01 KG/M2 | WEIGHT: 191.94 LBS | SYSTOLIC BLOOD PRESSURE: 170 MMHG

## 2024-11-07 DIAGNOSIS — Z79.4 TYPE 2 DIABETES MELLITUS WITH HYPERGLYCEMIA, WITH LONG-TERM CURRENT USE OF INSULIN: Primary | ICD-10-CM

## 2024-11-07 DIAGNOSIS — E11.65 TYPE 2 DIABETES MELLITUS WITH HYPERGLYCEMIA, WITH LONG-TERM CURRENT USE OF INSULIN: Primary | ICD-10-CM

## 2024-11-07 PROCEDURE — 99499 UNLISTED E&M SERVICE: CPT | Mod: S$PBB,,, | Performed by: NURSE PRACTITIONER

## 2024-11-12 ENCOUNTER — TELEPHONE (OUTPATIENT)
Dept: PHARMACY | Facility: CLINIC | Age: 69
End: 2024-11-12
Payer: MEDICARE

## 2024-11-12 ENCOUNTER — OFFICE VISIT (OUTPATIENT)
Dept: ENDOCRINOLOGY | Facility: CLINIC | Age: 69
End: 2024-11-12
Payer: MEDICARE

## 2024-11-12 VITALS
OXYGEN SATURATION: 96 % | SYSTOLIC BLOOD PRESSURE: 132 MMHG | HEIGHT: 63 IN | HEART RATE: 72 BPM | BODY MASS INDEX: 33.1 KG/M2 | DIASTOLIC BLOOD PRESSURE: 70 MMHG | WEIGHT: 186.81 LBS

## 2024-11-12 DIAGNOSIS — E66.01 CLASS 2 SEVERE OBESITY DUE TO EXCESS CALORIES WITH SERIOUS COMORBIDITY AND BODY MASS INDEX (BMI) OF 35.0 TO 35.9 IN ADULT: ICD-10-CM

## 2024-11-12 DIAGNOSIS — E11.65 TYPE 2 DIABETES MELLITUS WITH HYPERGLYCEMIA, WITH LONG-TERM CURRENT USE OF INSULIN: Primary | ICD-10-CM

## 2024-11-12 DIAGNOSIS — E03.9 ACQUIRED HYPOTHYROIDISM: ICD-10-CM

## 2024-11-12 DIAGNOSIS — Z79.4 TYPE 2 DIABETES MELLITUS WITH HYPERGLYCEMIA, WITH LONG-TERM CURRENT USE OF INSULIN: Primary | ICD-10-CM

## 2024-11-12 DIAGNOSIS — E21.3 HYPERPARATHYROIDISM: ICD-10-CM

## 2024-11-12 DIAGNOSIS — Z78.0 POSTMENOPAUSAL: ICD-10-CM

## 2024-11-12 DIAGNOSIS — E04.1 NODULAR THYROID DISEASE: ICD-10-CM

## 2024-11-12 DIAGNOSIS — E66.812 CLASS 2 SEVERE OBESITY DUE TO EXCESS CALORIES WITH SERIOUS COMORBIDITY AND BODY MASS INDEX (BMI) OF 35.0 TO 35.9 IN ADULT: ICD-10-CM

## 2024-11-12 PROCEDURE — G2211 COMPLEX E/M VISIT ADD ON: HCPCS | Mod: S$PBB,,, | Performed by: PHYSICIAN ASSISTANT

## 2024-11-12 PROCEDURE — 99999 PR PBB SHADOW E&M-EST. PATIENT-LVL V: CPT | Mod: PBBFAC,,, | Performed by: PHYSICIAN ASSISTANT

## 2024-11-12 PROCEDURE — 99215 OFFICE O/P EST HI 40 MIN: CPT | Mod: PBBFAC,PO | Performed by: PHYSICIAN ASSISTANT

## 2024-11-12 PROCEDURE — 99214 OFFICE O/P EST MOD 30 MIN: CPT | Mod: S$PBB,,, | Performed by: PHYSICIAN ASSISTANT

## 2024-11-12 RX ORDER — INSULIN LISPRO 100 [IU]/ML
INJECTION, SOLUTION INTRAVENOUS; SUBCUTANEOUS
Qty: 45 ML | Refills: 5 | Status: SHIPPED | OUTPATIENT
Start: 2024-11-12

## 2024-11-12 RX ORDER — INSULIN GLARGINE 100 [IU]/ML
INJECTION, SOLUTION SUBCUTANEOUS
Qty: 30 ML | Refills: 3 | Status: SHIPPED | OUTPATIENT
Start: 2024-11-12

## 2024-11-12 RX ORDER — HUMAN INSULIN 100 [IU]/ML
INJECTION, SUSPENSION SUBCUTANEOUS
Qty: 20 ML | Refills: 11 | Status: SHIPPED | OUTPATIENT
Start: 2024-11-12

## 2024-11-12 RX ORDER — LANCETS 28 GAUGE
EACH MISCELLANEOUS
Qty: 400 EACH | Refills: 3 | Status: SHIPPED | OUTPATIENT
Start: 2024-11-12

## 2024-11-12 NOTE — PATIENT INSTRUCTIONS
Medication Changes  Start Novolin N 40 units nightly.   Start Novolin R 11 u with breakfast, 12 u with lunch and 16 units with dinner.

## 2024-11-12 NOTE — TELEPHONE ENCOUNTER
Annie Mason has been informed of the [facility] and [employer] application process for Humalog and Lantus Pens and what's required to apply.  She will provide the following documents: Proof of household Income( such as social security statement, 1099 form, pension statement or 3 consecutive pay stubs, Copy of all Insurance cards( front and back), and Completed [facility] Authorization Forms        Follow-up will be made in 5 business days.     Kya Orellana  Pharmacy Patient Assistance Team   Not applicable

## 2024-11-12 NOTE — PROGRESS NOTES
"   Patient ID:  Annie Mason is a 68 y.o. female.    Chief Complaint:      Patient ID:  Annie Mason is a 66 y.o. female.     Chief Complaint:  Diabetes      Pt presents to follow up for type 2 diabetes, HPT, osteopenia, and review of chronic medical conditions as listed in the visit diagnosis section of this encounter.      PMH also significant for myasthenia gravis.      Onset of Type 2 diabetes mellitus was < 10 years ago.   Hx of CVA but no other macrovascular complications. No microvascular complications. No history of DKA.   Cardiovascular risk factors: advanced age (older than 55 for men, 65 for women), diabetes mellitus, dyslipidemia, hypertension and sedentary lifestyle. No hx of DR per pt. No history of pancreatitis or MEN syndrome.      Currently taking:  Humalog 11-12-14 units with meals (ran out a couple of months ago due to cost).  Lantus 34 units at night (ran out)     Home blood sugar records:   Fastins     No hypoglycemia.    Did not tolerate SGLT-2i, GLP-1, or metformin.    Previous meds:  Metformin-Read an article that metformin can cause spots on her legs and stopped taking. She had a tender spot on her right leg.  Farxiga-dizziness, polyuria  Bydrueon-severe nausea     Eye exam in  and no DPR per pt. Has +ve dry eye after cataract surgery     Eats 3 meals daily.   Snacks on chips.      Exercise:Active at work as a CNA.   Any episodes of hypoglycemia?  Yes, in the morning.      Still having hallucinations. Feels like cars are following her and driving towards her with their lights on. She "knows these things are real." Is not interested in taking more psych meds.     Nodular Thyroid Disease  Thyroid u/s  showed a subcm nodule in the right thyroid.   +hoarseness, dysphagia. No sob.     Hypothyroidism  + palpations,   No wt changes, hair loss, constipation/diarrhea or mental fog.    Hyperparathyroidism  Hx of renal stones  None recently  + occ muscle weakness  No n/v, " "abdominal pain or fractures.  Vd is nromal.    Dexa 11/22 was wnl  Diabetes Management Status    Statin: Taking  ACE/ARB: Not taking    Screening or Prevention Patient's value Goal Complete/Controlled?   HgA1C Testing and Control   Lab Results   Component Value Date    HGBA1C 10.6 (H) 08/21/2024      Annually/Less than 8% Yes   Lipid profile : 03/21/2024 Annually Yes   LDL control Lab Results   Component Value Date    LDLCALC 46.2 (L) 03/21/2024    Annually/Less than 100 mg/dl  Yes   Nephropathy screening Lab Results   Component Value Date    LABMICR 46.0 08/21/2024     Lab Results   Component Value Date    PROTEINUA Negative 09/05/2024    Annually Yes   Blood pressure BP Readings from Last 1 Encounters:   11/12/24 132/70    Less than 140/90 Yes   Dilated retinal exam : 02/27/2024 Annually Yes   Foot exam   : 04/04/2024 Annually Yes     Wt Readings from Last 10 Encounters:   11/12/24 84.8 kg (186 lb 13.4 oz)   11/07/24 87.1 kg (191 lb 14.6 oz)   09/05/24 91.2 kg (201 lb)   06/21/24 89.8 kg (198 lb)   04/19/24 90.5 kg (199 lb 6.5 oz)   04/16/24 93.9 kg (207 lb)   04/04/24 92.4 kg (203 lb 11.3 oz)   03/15/24 93 kg (205 lb)   02/28/24 93.1 kg (205 lb 4.8 oz)   02/21/24 93.4 kg (206 lb)      Review of Systems   Gastrointestinal:  Negative for abdominal pain.   Musculoskeletal:  Negative for arthralgias.   Neurological:  Positive for weakness.       Objective:   /70 (BP Location: Left arm, Patient Position: Sitting)   Pulse 72   Ht 5' 3" (1.6 m)   Wt 84.8 kg (186 lb 13.4 oz)   SpO2 96%   BMI 33.10 kg/m²   BP Readings from Last 3 Encounters:   11/12/24 132/70   11/07/24 (!) 170/77   09/05/24 130/80     Wt Readings from Last 10 Encounters:   11/12/24 84.8 kg (186 lb 13.4 oz)   11/07/24 87.1 kg (191 lb 14.6 oz)   09/05/24 91.2 kg (201 lb)   06/21/24 89.8 kg (198 lb)   04/19/24 90.5 kg (199 lb 6.5 oz)   04/16/24 93.9 kg (207 lb)   04/04/24 92.4 kg (203 lb 11.3 oz)   03/15/24 93 kg (205 lb)   02/28/24 93.1 kg (205 " lb 4.8 oz)   02/21/24 93.4 kg (206 lb)         Physical Exam  Vitals reviewed.   Constitutional:       General: She is not in acute distress.     Appearance: Normal appearance. She is well-developed. She is not ill-appearing, toxic-appearing or diaphoretic.   HENT:      Head: Normocephalic and atraumatic.   Eyes:      General: No scleral icterus.  Neck:      Trachea: No tracheal deviation.   Pulmonary:      Effort: Pulmonary effort is normal. No respiratory distress.   Neurological:      Mental Status: She is alert and oriented to person, place, and time.   Psychiatric:         Thought Content: Thought content normal.       4/24  Foot Exam: no sores or macerations noted.     Protective Sensation (w/ 10 gram monofilament):  Right: Intact  Left: Intact    Visual Inspection:  Normal -  Bilateral, Nails Intact - without Evidence of Foot Deformity- Bilateral, and Dry Skin -  Bilateral    Pedal Pulses:   Right: Present  Left: Present    Posterior Tibialis Pulses:   Right:Present  Left: Present     Vibratory Sensation  Right:Positive  Left:Positive     Lab Results   Component Value Date     08/21/2024    K 4.0 08/21/2024     08/21/2024    CO2 23 08/21/2024    BUN 11 08/21/2024    CREATININE 1.0 08/21/2024     (H) 08/21/2024    HGBA1C 10.6 (H) 08/21/2024    MG 2.0 02/11/2024    AST 48 (H) 02/19/2024    AST 44 (H) 03/05/2016    ALT 28 02/19/2024    ALBUMIN 3.8 08/21/2024    PROT 8.2 02/19/2024    BILITOT 0.9 02/19/2024    WBC 8.06 02/19/2024    HGB 13.7 02/19/2024    HCT 41.9 02/19/2024    MCV 94 02/19/2024    MCH 30.7 02/19/2024    MCHC 32.7 02/19/2024    RDW 12.9 02/19/2024     02/19/2024    MPV 9.8 02/19/2024    GRAN 4.8 02/19/2024    GRAN 59.3 02/19/2024    LYMPH 2.4 02/19/2024    LYMPH 30.3 02/19/2024    CHOL 145 03/21/2024    HDL 83 (H) 03/21/2024    LDLCALC 46.2 (L) 03/21/2024    TRIG 79 03/21/2024     Lab Results   Component Value Date    TSH 0.718 08/21/2024    FREET4 0.86 08/21/2024             Latest Ref Rng & Units 2/19/2024    12:22 PM 3/21/2024     9:53 AM 8/21/2024    10:35 AM   Thyroid Labs   TSH 0.400 - 4.000 uIU/mL  1.330  0.718    Free T4 0.71 - 1.51 ng/dL  0.87  0.86    Sodium 136 - 145 mmol/L 137  139  139    Potassium 3.5 - 5.1 mmol/L 5.2  3.4  4.0    Chloride 95 - 110 mmol/L 103  103  104    Carbon Dioxide 23 - 29 mmol/L 27  31  23    Glucose 70 - 110 mg/dL 113  77  299    Blood Urea Nitrogen 8 - 23 mg/dL 16  16  11    Creatinine 0.5 - 1.4 mg/dL 0.77  0.76  1.0    Calcium 8.7 - 10.5 mg/dL 10.1  10.1  10.3    Total Protein 6.0 - 8.4 g/dL 8.2      Albumin 3.5 - 5.2 g/dL 4.7  4.1  3.8    Total Bilirubin 0.2 - 1.3 mg/dL 0.9      AST 14 - 36 U/L 48      ALT 0 - 35 U/L 28      Anion Gap 8 - 16 mmol/L 7  5  12    WBC 3.90 - 12.70 K/uL 8.06      RBC 4.00 - 5.40 M/uL 4.46      Hemoglobin 12.0 - 16.0 g/dL 13.7      Hematocrit 37.0 - 48.5 % 41.9      MCV 82 - 98 fL 94      MCH 27.0 - 31.0 pg 30.7      MCHC 32.0 - 36.0 g/dL 32.7      RDW 11.5 - 14.5 % 12.9      Platelets 150 - 450 K/uL 228      MPV 9.2 - 12.9 fL 9.8      Gran # 1.8 - 7.7 K/uL 4.8      Lymph # 1.0 - 4.8 K/uL 2.4      Mono # 0.3 - 1.0 K/uL 0.6      Eos # 0.0 - 0.5 K/uL 0.1      Baso # 0.00 - 0.20 K/uL 0.06      Gran % 38.0 - 73.0 % 59.3      Lymph % 18.0 - 48.0 % 30.3      Mono% 4.0 - 15.0 % 7.7      Eos % 0.0 - 8.0 % 1.6      Baso % 0.0 - 1.9 % 0.7          Hemoglobin A1C   Date Value Ref Range Status   08/21/2024 10.6 (H) 4.0 - 5.6 % Final     Comment:     ADA Screening Guidelines:  5.7-6.4%  Consistent with prediabetes  >or=6.5%  Consistent with diabetes    High levels of fetal hemoglobin interfere with the HbA1C  assay. Heterozygous hemoglobin variants (HbS, HgC, etc)do  not significantly interfere with this assay.   However, presence of multiple variants may affect accuracy.     03/21/2024 8.7 (H) 0.0 - 5.6 % Final     Comment:     Reference Interval:  5.0 - 5.6 Normal   5.7 - 6.4 High Risk   > 6.5 Diabetic      Hgb A1c results  are standardized based on the (NGSP) National   Glycohemoglobin Standardization Program.      Hemoglobin A1C levels are related to mean serum/plasma glucose   during the preceding 2-3 months.        02/10/2024 8.9 (H) 0.0 - 5.6 % Final     Comment:     Reference Interval:  5.0 - 5.6 Normal   5.7 - 6.4 High Risk   > 6.5 Diabetic      Hgb A1c results are standardized based on the (NGSP) National   Glycohemoglobin Standardization Program.      Hemoglobin A1C levels are related to mean serum/plasma glucose   during the preceding 2-3 months.          8/23 Thyroid u/s  FINDINGS:  The right thyroid lobe measures 2.7 x 2.2 x 1.5 cm.  The left thyroid lobe measures 3.0 x 1.5 x 0.9 cm.  The total thyroid weight is 6.7 g.  There is a 6 mm smooth, circumscribed, wider than tall, primarily solid isoechoic nodule anteriorly in the upper pole of the right thyroid lobe.  There is diffusely diminished arterial and venous color flow present throughout the thyroid gland.  No new pathologically enlarged or morphologically abnormal lymph nodes in the left or right neck adjacent to the thyroid gland.    11/22 DXA  COMPARISON:  Outside study of 03/12/2020     FINDINGS:  The L1 to L4 vertebral bone mineral density is equal to 1.108 g/cm squared with a T score of -0.4..  Previously 1.184 g per sq cm with T-score -0.1     The left femoral neck bone mineral density is equal to 0.806 g/cm squared with a T score of -0.4.  Previously 0.804 g per sq cm T-score -1.1.     The total hip bone mineral density is equal to 1.039 g/cm squared with a T score of 0.8.     Previously 0.959 g per sq cm with T-score 0.0     There is a 11% risk of a major osteoporotic fracture and a 0.7% risk of hip fracture in the next 10 years (FRAX).     Impression:     Normal bone mineral density     Assessment and plan:      1. Type 2 diabetes mellitus with hyperglycemia, with long-term current use of insulin        2. Acquired hypothyroidism        3. Hyperparathyroidism         4. Class 2 severe obesity due to excess calories with serious comorbidity and body mass index (BMI) of 35.0 to 35.9 in adult           T2DM     Pt with macrovascular complication of CVA.  Pt taking antipsychotics which can exacerbate BG.     Medication Changes  Start Novolin N 40 units nightly.   Start Novolin R 11 u with breakfast, 12 u with lunch and 16 units with dinner.     Sent Humalog & Lantus  to pharmacy assistance.   She should continue the same doses.     Declines CGM.     A1c is elevated. Goal is <7% if this can be obtained without hypoglycemia.  Pt with labile BG. Has some hyperglycemia. Continue current regimen.       Reviewed how to correct hypoglycemia.     Did not tolerate SGLT-2i or metformin.  Check BG before meals and bedtime. Reviewed benefits of CGM but pt still not interested.    Urine micro wnls.  Counseled pt on low carb/low fat diet and to increase physical activity.  On statin.              Hypothyroidism     Overall is clinically euthyroid. TFTs wnl.. Continue current dose of thyroid hormone. F/u with PCP.            Hyperparathyroidism     Suspect  primary hyperparathyroidism  vit D & ca are wnl  pth trended up to 180's>>130  Prior 24 urine calcium 2020- 24 hr ca/cr ratio 0.0099 (U ca 5.9, U Cr 40, S ca 9.5, S Cr 0.59). Gene test for FHH--never done  Will recheck urine  Dexa is wnl.  Forearm BMD wnls. Repeat due now  Prior renal US 2020 with no stones  Had new onset psychotic episodes ? Related-Though calcium has been wnls. Has some constipation and other none specific sx's.      Discussed indications for surgery if primary hyperparathyroidism proven     Avoid dehydration, excessive calcium supplementation and meds (HCTZ)  that can worsen hypercalcemia.          Relevant Orders      Class 2 severe obesity due to excess calories with serious comorbidity and body mass index (BMI) of 38.0 to 38.9 in adult     Body mass index is 33.1 kg/m².  Encouraged healthy low fat low carb diet and  increase physical activity.               F/u in 3 mths w/ labs prior -A1c, rp,tfts, lp, pth, iCa-w/ thyroid u/s/dexa (roshan).

## 2024-11-12 NOTE — LETTER
November 12, 2024    Annie Mason  [address]  [place] [address]         Hello Ms. Annie DEE Mason     It was a pleasure speaking with you. To follow up on our conversation on 11/12/2024, the Pharmacy Patient Assistance Program needs more information from you before we can submit your Humalog and Lantus Pens application to the [facility] and [employer] Program. Please return the following documents to the Pharmacy Patient Assistance office ASA.  Fax requested documentation to [phone] or email [email or web address]. Documentation can also be mailed to address listed below       Proof of household Income( such as social security statement, 1099 form, pension statement or 3 consecutive pay stubs, Copy of all Insurance cards( front and back), and Completed Medication Access Center Authorization Forms             Whats Next:     Once I receive your documentation and authorization from your Provider, your application will be submitted to the respective Assistance Program for review. Please be advised it will take 2 to 4 weeks for your application to be processed so you may have to purchase a month's supply of medication from your pharmacy to hold you over during the waiting period. You will be notified of approval or denial by The Program(mail) or myself.      If you have any questions or concerns, please give me a call.         Sincerely   Kya CM [phone]  Pharmacy Patient Assistance  [id number]  [place] [address]  Fax: [phone]  Email: [email or web address]

## 2024-12-05 DIAGNOSIS — E11.65 TYPE 2 DIABETES MELLITUS WITH HYPERGLYCEMIA, WITH LONG-TERM CURRENT USE OF INSULIN: ICD-10-CM

## 2024-12-05 DIAGNOSIS — Z79.4 TYPE 2 DIABETES MELLITUS WITH HYPERGLYCEMIA, WITH LONG-TERM CURRENT USE OF INSULIN: ICD-10-CM

## 2024-12-05 NOTE — TELEPHONE ENCOUNTER
----- Message from Ignacio sent at 12/5/2024 12:12 PM CST -----  Contact: Self  Type:  RX Refill Request    Who Called:  Patient    Refill or New Rx:  Refill  RX Name and Strength:  blood sugar diagnostic Strp freestyle lite strips and also the needles    How is the patient currently taking it? (ex. 1XDay):  As Prescribed  Is this a 30 day or 90 day RX:  NA    Preferred Pharmacy with phone number:    [facility]/pharmacy #[id number]  [place] - [address] [facility] [address] [place]  [address] [place] [id number]  Phone: [phone] Fax: [phone]    Local or Mail Order:  Local  Ordering Provider:  Jose Camarena Call Back Number:  [phone]  Additional Information:  Patient is requesting refills, and also a call back to speak to a nurse regarding her insulin

## 2024-12-06 RX ORDER — LANCETS 28 GAUGE
EACH MISCELLANEOUS
Qty: 400 EACH | Refills: 3 | Status: SHIPPED | OUTPATIENT
Start: 2024-12-06

## 2024-12-20 PROBLEM — R07.9 CHEST PAIN: Status: RESOLVED | Noted: 2024-02-09 | Resolved: 2024-12-20

## 2024-12-28 DIAGNOSIS — Z79.4 TYPE 2 DIABETES MELLITUS WITH HYPERGLYCEMIA, WITH LONG-TERM CURRENT USE OF INSULIN: ICD-10-CM

## 2024-12-28 DIAGNOSIS — E11.65 TYPE 2 DIABETES MELLITUS WITH HYPERGLYCEMIA, WITH LONG-TERM CURRENT USE OF INSULIN: ICD-10-CM

## 2025-02-19 ENCOUNTER — HOSPITAL ENCOUNTER (OUTPATIENT)
Dept: RADIOLOGY | Facility: HOSPITAL | Age: 70
Discharge: HOME | End: 2025-02-19
Attending: PHYSICIAN ASSISTANT
Payer: MEDICARE

## 2025-02-19 DIAGNOSIS — Z78.0 POSTMENOPAUSAL: ICD-10-CM

## 2025-02-19 DIAGNOSIS — E04.1 NODULAR THYROID DISEASE: ICD-10-CM

## 2025-02-19 PROCEDURE — 76536 US EXAM OF HEAD AND NECK: CPT | Mod: TC,PO

## 2025-02-19 PROCEDURE — 77080 DXA BONE DENSITY AXIAL: CPT | Mod: TC,PO

## 2025-02-20 ENCOUNTER — OFFICE VISIT (OUTPATIENT)
Dept: ENDOCRINOLOGY | Facility: CLINIC | Age: 70
End: 2025-02-20
Payer: MEDICARE

## 2025-02-20 VITALS
OXYGEN SATURATION: 96 % | HEART RATE: 78 BPM | BODY MASS INDEX: 35.7 KG/M2 | HEIGHT: 63 IN | TEMPERATURE: 99 F | WEIGHT: 201.5 LBS | SYSTOLIC BLOOD PRESSURE: 150 MMHG | DIASTOLIC BLOOD PRESSURE: 70 MMHG

## 2025-02-20 DIAGNOSIS — E11.65 TYPE 2 DIABETES MELLITUS WITH HYPERGLYCEMIA, WITH LONG-TERM CURRENT USE OF INSULIN: Primary | ICD-10-CM

## 2025-02-20 DIAGNOSIS — E55.9 HYPOVITAMINOSIS D: ICD-10-CM

## 2025-02-20 DIAGNOSIS — E03.9 ACQUIRED HYPOTHYROIDISM: ICD-10-CM

## 2025-02-20 DIAGNOSIS — E66.01 CLASS 2 SEVERE OBESITY DUE TO EXCESS CALORIES WITH SERIOUS COMORBIDITY AND BODY MASS INDEX (BMI) OF 35.0 TO 35.9 IN ADULT: ICD-10-CM

## 2025-02-20 DIAGNOSIS — E66.812 CLASS 2 SEVERE OBESITY DUE TO EXCESS CALORIES WITH SERIOUS COMORBIDITY AND BODY MASS INDEX (BMI) OF 35.0 TO 35.9 IN ADULT: ICD-10-CM

## 2025-02-20 DIAGNOSIS — Z78.0 OSTEOPENIA AFTER MENOPAUSE: ICD-10-CM

## 2025-02-20 DIAGNOSIS — Z79.4 TYPE 2 DIABETES MELLITUS WITH HYPERGLYCEMIA, WITH LONG-TERM CURRENT USE OF INSULIN: Primary | ICD-10-CM

## 2025-02-20 DIAGNOSIS — M85.80 OSTEOPENIA AFTER MENOPAUSE: ICD-10-CM

## 2025-02-20 DIAGNOSIS — E21.3 HYPERPARATHYROIDISM: ICD-10-CM

## 2025-02-20 DIAGNOSIS — Z78.0 POSTMENOPAUSAL: ICD-10-CM

## 2025-02-20 PROBLEM — E11.51 TYPE 2 DIABETES MELLITUS WITH DIABETIC PERIPHERAL ANGIOPATHY WITHOUT GANGRENE, WITHOUT LONG-TERM CURRENT USE OF INSULIN: Chronic | Status: RESOLVED | Noted: 2022-07-01 | Resolved: 2025-02-20

## 2025-02-20 PROCEDURE — 99213 OFFICE O/P EST LOW 20 MIN: CPT | Mod: PBBFAC,PO | Performed by: PHYSICIAN ASSISTANT

## 2025-02-20 RX ORDER — EZETIMIBE 10 MG/1
10 TABLET ORAL DAILY
Qty: 90 TABLET | Refills: 3 | Status: SHIPPED | OUTPATIENT
Start: 2025-02-20 | End: 2026-02-20

## 2025-02-20 NOTE — PATIENT INSTRUCTIONS
Medication Changes  Continue Novolin N 50 units nightly.   Start Novolin R 10 u with breakfast, 10 u with lunch and 18 units with dinner.    Bones  Your Dexa scan shows osteopenia. Start taking 2000 IU of vitamin D and 1500 mg of calcium. Also, participate in weight bearing and resistance exercises for 40 min 4x weekly to maintain your bone density.     Cholesterol  Start zetia 10 mg daily.

## 2025-02-20 NOTE — PROGRESS NOTES
"   Patient ID:  Annie Mason is a 69 y.o. female.    Chief Complaint:      Patient ID:  Annie Mason is a 66 y.o. female.     Chief Complaint:  Diabetes      Pt presents to follow up for type 2 diabetes, HPT, osteopenia, and review of chronic medical conditions as listed in the visit diagnosis section of this encounter.      PMH also significant for myasthenia gravis.      Onset of Type 2 diabetes mellitus was < 10 years ago.   Hx of CVA but no other macrovascular complications. No microvascular complications. No history of DKA.   Cardiovascular risk factors: advanced age (older than 55 for men, 65 for women), diabetes mellitus, dyslipidemia, hypertension and sedentary lifestyle. No hx of DR per pt. No history of pancreatitis or MEN syndrome.      Currently taking:  Novolin R --16  Novolin N 40 units    Previous meds:  Humalog 11-12-14 units with meals (ran out a couple of months ago due to cost).  Lantus 34 units at night (ran out)     Home blood sugar records:   Fastins     Occ hypoglycemia after lunch.     Did not tolerate SGLT-2i, GLP-1, or metformin.    Previous meds:  Metformin-Read an article that metformin can cause spots on her legs and stopped taking. She had a tender spot on her right leg.  Farxiga-dizziness, polyuria  Bydrueon-severe nausea     Eye exam in  and no DPR per pt. Has +ve dry eye after cataract surgery     Eats 3 meals daily.   Snacks on chips.      Exercise:Active at work as a CNA.   Any episodes of hypoglycemia?  Yes, in the morning.      Still having hallucinations. Feels like cars are following her and driving towards her with their lights on. She "knows these things are real." Is not interested in taking more psych meds.     Nodular Thyroid Disease  Thyroid u/s  showed a subcm nodule in the right thyroid.   +hoarseness, dysphagia. No sob.     Hypothyroidism  + palpations,   No wt changes, hair loss, constipation/diarrhea or mental fog.    Hyperparathyroidism  Hx " of renal stones  None recently  + occ muscle weakness  No n/v, abdominal pain or fractures.  Vd is nromal.    Dexa 2/25was wnl  Diabetes Management Status    Statin: Taking  ACE/ARB: Not taking    Screening or Prevention Patient's value Goal Complete/Controlled?   HgA1C Testing and Control   Lab Results   Component Value Date    HGBA1C 9.8 (H) 02/19/2025      Annually/Less than 8% Yes   Lipid profile : 02/19/2025 Annually Yes   LDL control Lab Results   Component Value Date    LDLCALC 120.8 02/19/2025    Annually/Less than 100 mg/dl  Yes   Nephropathy screening Lab Results   Component Value Date    LABMICR 46.0 08/21/2024     Lab Results   Component Value Date    PROTEINUA Negative 09/05/2024    Annually Yes   Blood pressure BP Readings from Last 1 Encounters:   01/15/25 (!) 146/74    Less than 140/90 Yes   Dilated retinal exam 10/24 Dr. Mosley Annually Yes   Foot exam   : 04/04/2024 Annually Yes     Wt Readings from Last 10 Encounters:   01/15/25 90.3 kg (199 lb)   01/06/25 89.5 kg (197 lb 6.4 oz)   12/26/24 88.6 kg (195 lb 4 oz)   11/12/24 84.8 kg (186 lb 13.4 oz)   11/07/24 87.1 kg (191 lb 14.6 oz)   09/05/24 91.2 kg (201 lb)   06/21/24 89.8 kg (198 lb)   04/19/24 90.5 kg (199 lb 6.5 oz)   04/16/24 93.9 kg (207 lb)   04/04/24 92.4 kg (203 lb 11.3 oz)      Review of Systems   Gastrointestinal:  Negative for abdominal pain.   Musculoskeletal:  Negative for arthralgias.   Neurological:  Positive for weakness.       Objective:   There were no vitals taken for this visit.  BP Readings from Last 3 Encounters:   01/15/25 (!) 146/74   01/06/25 (!) 168/82   12/26/24 (!) 150/82     Wt Readings from Last 10 Encounters:   01/15/25 90.3 kg (199 lb)   01/06/25 89.5 kg (197 lb 6.4 oz)   12/26/24 88.6 kg (195 lb 4 oz)   11/12/24 84.8 kg (186 lb 13.4 oz)   11/07/24 87.1 kg (191 lb 14.6 oz)   09/05/24 91.2 kg (201 lb)   06/21/24 89.8 kg (198 lb)   04/19/24 90.5 kg (199 lb 6.5 oz)   04/16/24 93.9 kg (207 lb)   04/04/24 92.4 kg (203  lb 11.3 oz)         Physical Exam  Vitals reviewed.   Constitutional:       General: She is not in acute distress.     Appearance: Normal appearance. She is well-developed. She is not ill-appearing, toxic-appearing or diaphoretic.   HENT:      Head: Normocephalic and atraumatic.   Eyes:      General: No scleral icterus.  Neck:      Trachea: No tracheal deviation.   Pulmonary:      Effort: Pulmonary effort is normal. No respiratory distress.   Neurological:      Mental Status: She is alert and oriented to person, place, and time.   Psychiatric:         Thought Content: Thought content normal.       4/24  Foot Exam: no sores or macerations noted.     Protective Sensation (w/ 10 gram monofilament):  Right: Intact  Left: Intact    Visual Inspection:  Normal -  Bilateral, Nails Intact - without Evidence of Foot Deformity- Bilateral, and Dry Skin -  Bilateral    Pedal Pulses:   Right: Present  Left: Present    Posterior Tibialis Pulses:   Right:Present  Left: Present     Vibratory Sensation  Right:Positive  Left:Positive     Lab Results   Component Value Date     02/19/2025    K 4.1 02/19/2025     02/19/2025    CO2 28 02/19/2025    BUN 15 02/19/2025    CREATININE 0.8 02/19/2025     (H) 02/19/2025    HGBA1C 9.8 (H) 02/19/2025    MG 2.0 02/11/2024    AST 28 01/01/2025    AST 48 (H) 02/19/2024    AST 44 (H) 03/05/2016    ALT 23 01/01/2025    ALT 28 02/19/2024    ALBUMIN 4.1 02/19/2025    PROT 8.2 02/19/2024    BILITOT 0.4 01/01/2025    BILITOT 0.9 02/19/2024    WBC 8.06 02/19/2024    HGB 13.7 02/19/2024    HCT 41.9 02/19/2024    MCV 94 02/19/2024    MCH 30.7 02/19/2024    MCHC 32.7 02/19/2024    RDW 12.9 02/19/2024     02/19/2024    MPV 9.8 02/19/2024    GRAN 4.8 02/19/2024    GRAN 59.3 02/19/2024    LYMPH 2.4 02/19/2024    LYMPH 30.3 02/19/2024    CHOL 202 (H) 02/19/2025    HDL 68 02/19/2025    LDLCALC 120.8 02/19/2025    TRIG 66 02/19/2025     Lab Results   Component Value Date    TSH 0.718  08/21/2024    FREET4 0.86 08/21/2024          Latest Ref Rng & Units 12/24/2024     9:10 PM 1/1/2025     9:45 PM 2/19/2025     9:31 AM   Thyroid Labs   Sodium 136 - 145 mmol/L 137  133  140    Potassium 3.5 - 5.1 mmol/L 3.9  3.9  4.1    Chloride 95 - 110 mmol/L   105    Carbon Dioxide 23 - 29 mmol/L 31  28  28    Glucose 70 - 110 mg/dL   170    Blood Urea Nitrogen 8 - 23 mg/dL 11.0  11.0  15    Creatinine 0.5 - 1.4 mg/dL 0.70  0.74  0.8    Calcium 8.7 - 10.5 mg/dL 9.5  9.8  10.2    Albumin 3.5 - 5.2 g/dL 4.0  4.0  4.1    Total Bilirubin <1.3 mg/dL 0.8  0.4     AST <45 U/L 23  28     ALT <46 U/L 22  23     Anion Gap 8 - 16 mmol/L 4  8  7        Hemoglobin A1C   Date Value Ref Range Status   02/19/2025 9.8 (H) 4.0 - 5.6 % Final     Comment:     ADA Screening Guidelines:  5.7-6.4%  Consistent with prediabetes  >or=6.5%  Consistent with diabetes    High levels of fetal hemoglobin interfere with the HbA1C  assay. Heterozygous hemoglobin variants (HbS, HgC, etc)do  not significantly interfere with this assay.   However, presence of multiple variants may affect accuracy.     01/01/2025 11.6 (H) 4.7 - 5.6 % Final   08/21/2024 10.6 (H) 4.0 - 5.6 % Final     Comment:     ADA Screening Guidelines:  5.7-6.4%  Consistent with prediabetes  >or=6.5%  Consistent with diabetes    High levels of fetal hemoglobin interfere with the HbA1C  assay. Heterozygous hemoglobin variants (HbS, HgC, etc)do  not significantly interfere with this assay.   However, presence of multiple variants may affect accuracy.     03/21/2024 8.7 (H) 0.0 - 5.6 % Final     Comment:     Reference Interval:  5.0 - 5.6 Normal   5.7 - 6.4 High Risk   > 6.5 Diabetic      Hgb A1c results are standardized based on the (NGSP) National   Glycohemoglobin Standardization Program.      Hemoglobin A1C levels are related to mean serum/plasma glucose   during the preceding 2-3 months.          8/23 Thyroid u/s  FINDINGS:  The right thyroid lobe measures 2.7 x 2.2 x 1.5 cm.   The left thyroid lobe measures 3.0 x 1.5 x 0.9 cm.  The total thyroid weight is 6.7 g.  There is a 6 mm smooth, circumscribed, wider than tall, primarily solid isoechoic nodule anteriorly in the upper pole of the right thyroid lobe.  There is diffusely diminished arterial and venous color flow present throughout the thyroid gland.  No new pathologically enlarged or morphologically abnormal lymph nodes in the left or right neck adjacent to the thyroid gland.    11/22 DXA  COMPARISON:  Outside study of 03/12/2020     FINDINGS:  The L1 to L4 vertebral bone mineral density is equal to 1.108 g/cm squared with a T score of -0.4..  Previously 1.184 g per sq cm with T-score -0.1     The left femoral neck bone mineral density is equal to 0.806 g/cm squared with a T score of -0.4.  Previously 0.804 g per sq cm T-score -1.1.     The total hip bone mineral density is equal to 1.039 g/cm squared with a T score of 0.8.     Previously 0.959 g per sq cm with T-score 0.0     There is a 11% risk of a major osteoporotic fracture and a 0.7% risk of hip fracture in the next 10 years (FRAX).     Impression:     Normal bone mineral density     Assessment and plan:      1. Type 2 diabetes mellitus with hyperglycemia, with long-term current use of insulin        2. Acquired hypothyroidism        3. Hyperparathyroidism        4. Postmenopausal        5. Hypovitaminosis D           T2DM     Pt with macrovascular complication of CVA.  Pt taking antipsychotics which can exacerbate BG.     Medication Changes  Continue Novolin N 50 units nightly.   Start Novolin R 10 u with breakfast, 10 u with lunch and 18 units with dinner.    Sent Humalog & Lantus  to pharmacy assistance.   She should continue the same doses.     Declines CGM.     A1c is elevated. Goal is <7% if this can be obtained without hypoglycemia.  Pt with labile BG. Has some hyperglycemia. Continue current regimen.       Reviewed how to correct hypoglycemia.     Did not tolerate  SGLT-2i or metformin.  Check BG before meals and bedtime. Reviewed benefits of CGM but pt still not interested.    Urine micro wnls.  Counseled pt on low carb/low fat diet and to increase physical activity.  On statin.              Hypothyroidism     Overall is clinically euthyroid. TFTs wnl.. Continue current dose of thyroid hormone. F/u with PCP.            Hyperparathyroidism     Suspect  primary hyperparathyroidism  vit D & ca are wnl  pth trended up to 180's>>130  Prior 24 urine calcium 2020- 24 hr ca/cr ratio 0.0099 (U ca 5.9, U Cr 40, S ca 9.5, S Cr 0.59). Gene test for FHH--never done  Will recheck urine  Dexa shows osteopenia.  Forearm BMD wnls. Repeat due now  CT 12/24 w/ no stones.  Had new onset psychotic episodes ? Related-Though calcium has been wnls. Has some constipation and other none specific sx's.      Discussed indications for surgery if primary hyperparathyroidism proven     Avoid dehydration, excessive calcium supplementation and meds (HCTZ)  that can worsen hypercalcemia.          Class 2 severe obesity due to excess calories with serious comorbidity and body mass index (BMI) of 38.0 to 38.9 in adult     Body mass index is 35.69 kg/m².  Encouraged healthy low fat low carb diet and increase physical activity.             HLD  Stopped statin due to muscle weakness & CK was high. Will try zetia.      Osteopenia     Start taking 2000 IU of vitamin D and 1500 mg of calcium. Also, participate in weight bearing and resistance exercises for 40 min 4x weekly to maintain bone density.      FOLLOWUP  Obtain eye note  F/u in 3 mths w/ labs prior -A1c, cmp,tfts, lp, pth, iCa, vb12

## 2025-02-21 ENCOUNTER — TELEPHONE (OUTPATIENT)
Dept: PHARMACY | Facility: CLINIC | Age: 70
End: 2025-02-21
Payer: MEDICARE

## 2025-02-21 NOTE — TELEPHONE ENCOUNTER
We have reached out to Ms. Mason via letter and portal message  to inform her of the [facility] and [employer] application process for Humalog and Lantus [place]. A follow-up phone call will be made in 5 business days.    Gabriela Mccain  Pharmacy Patient Assistance Team

## 2025-02-21 NOTE — LETTER
February 21, 2025    Annie KEMP [address]  [place] [address]             Dear MsAyse faithrandy,      My name is Gabriela Mccain  I am reaching out on behalf of [facility]s Pharmacy Patient Assistance Team in regards to your request for medication assistance. Our goal is to assist qualified [facility] patients with obtaining financial assistance for prescribed medications.     Please note that enrollment into available support may require the following documents:      Proof of household Income (such as social security award letter, pension statement,1040)  Copy of all insurance cards (front and back)  Completed Medication Access [facility] Authorization Forms        Please reach out to my phone number below if you are still in need of assistance with your medications. Follow-up call will be made in 5 business days. We look forward to hearing from you soon!    Thank you for choosing [facility] for your healthcare needs      Sincerely  Gabriela DURAN @[phone]  Pharmacy Patient Assistance Team  [address] [place] [address]  [id number] [place] [id number]  Fax: [phone]  Email: [email or web address]